# Patient Record
Sex: FEMALE | Race: WHITE | NOT HISPANIC OR LATINO | Employment: OTHER | URBAN - METROPOLITAN AREA
[De-identification: names, ages, dates, MRNs, and addresses within clinical notes are randomized per-mention and may not be internally consistent; named-entity substitution may affect disease eponyms.]

---

## 2022-01-01 ENCOUNTER — DOCUMENTATION ONLY (OUTPATIENT)
Dept: INFUSION THERAPY | Facility: HOSPITAL | Age: 72
End: 2022-01-01
Payer: MEDICARE

## 2022-01-01 ENCOUNTER — TELEPHONE (OUTPATIENT)
Dept: HEMATOLOGY/ONCOLOGY | Facility: CLINIC | Age: 72
End: 2022-01-01
Payer: MEDICARE

## 2022-01-01 ENCOUNTER — INFUSION (OUTPATIENT)
Dept: INFUSION THERAPY | Facility: HOSPITAL | Age: 72
End: 2022-01-01
Attending: NURSE PRACTITIONER
Payer: MEDICARE

## 2022-01-01 ENCOUNTER — OFFICE VISIT (OUTPATIENT)
Dept: PALLIATIVE MEDICINE | Facility: CLINIC | Age: 72
End: 2022-01-01
Payer: MEDICARE

## 2022-01-01 ENCOUNTER — HOSPITAL ENCOUNTER (OUTPATIENT)
Dept: RADIOLOGY | Facility: HOSPITAL | Age: 72
Discharge: HOME | End: 2022-11-10
Attending: INTERNAL MEDICINE
Payer: MEDICARE

## 2022-01-01 ENCOUNTER — INFUSION (OUTPATIENT)
Dept: INFUSION THERAPY | Facility: HOSPITAL | Age: 72
End: 2022-01-01
Attending: INTERNAL MEDICINE
Payer: MEDICARE

## 2022-01-01 ENCOUNTER — TELEPHONE (OUTPATIENT)
Dept: PALLIATIVE MEDICINE | Facility: CLINIC | Age: 72
End: 2022-01-01
Payer: MEDICARE

## 2022-01-01 ENCOUNTER — PATIENT MESSAGE (OUTPATIENT)
Dept: HEMATOLOGY/ONCOLOGY | Facility: CLINIC | Age: 72
End: 2022-01-01
Payer: MEDICARE

## 2022-01-01 ENCOUNTER — OFFICE VISIT (OUTPATIENT)
Dept: HEMATOLOGY/ONCOLOGY | Facility: CLINIC | Age: 72
End: 2022-01-01
Payer: MEDICARE

## 2022-01-01 ENCOUNTER — TELEPHONE (OUTPATIENT)
Dept: INFUSION THERAPY | Facility: HOSPITAL | Age: 72
End: 2022-01-01
Payer: MEDICARE

## 2022-01-01 ENCOUNTER — LAB VISIT (OUTPATIENT)
Dept: LAB | Facility: HOSPITAL | Age: 72
End: 2022-01-01
Attending: INTERNAL MEDICINE
Payer: MEDICARE

## 2022-01-01 ENCOUNTER — HOSPITAL ENCOUNTER (OUTPATIENT)
Dept: RADIOLOGY | Facility: HOSPITAL | Age: 72
Discharge: HOME | End: 2022-10-17
Attending: INTERNAL MEDICINE
Payer: MEDICARE

## 2022-01-01 ENCOUNTER — DOCUMENTATION ONLY (OUTPATIENT)
Dept: INFUSION THERAPY | Facility: HOSPITAL | Age: 72
End: 2022-01-01

## 2022-01-01 ENCOUNTER — PATIENT MESSAGE (OUTPATIENT)
Dept: PALLIATIVE MEDICINE | Facility: CLINIC | Age: 72
End: 2022-01-01
Payer: MEDICARE

## 2022-01-01 ENCOUNTER — TELEPHONE (OUTPATIENT)
Dept: HEMATOLOGY/ONCOLOGY | Facility: CLINIC | Age: 72
End: 2022-01-01

## 2022-01-01 ENCOUNTER — PATIENT MESSAGE (OUTPATIENT)
Dept: HEMATOLOGY/ONCOLOGY | Facility: CLINIC | Age: 72
End: 2022-01-01

## 2022-01-01 ENCOUNTER — TELEPHONE (OUTPATIENT)
Dept: RADIATION ONCOLOGY | Facility: CLINIC | Age: 72
End: 2022-01-01
Payer: MEDICARE

## 2022-01-01 ENCOUNTER — CLINICAL SUPPORT (OUTPATIENT)
Dept: CARDIOLOGY | Facility: HOSPITAL | Age: 72
End: 2022-01-01
Attending: STUDENT IN AN ORGANIZED HEALTH CARE EDUCATION/TRAINING PROGRAM
Payer: MEDICARE

## 2022-01-01 VITALS
HEIGHT: 68 IN | SYSTOLIC BLOOD PRESSURE: 115 MMHG | DIASTOLIC BLOOD PRESSURE: 63 MMHG | BODY MASS INDEX: 19.06 KG/M2 | WEIGHT: 125.75 LBS | RESPIRATION RATE: 18 BRPM | HEART RATE: 89 BPM | TEMPERATURE: 98 F | OXYGEN SATURATION: 94 %

## 2022-01-01 VITALS
SYSTOLIC BLOOD PRESSURE: 102 MMHG | HEIGHT: 68 IN | DIASTOLIC BLOOD PRESSURE: 60 MMHG | HEART RATE: 89 BPM | OXYGEN SATURATION: 97 % | BODY MASS INDEX: 19.71 KG/M2 | WEIGHT: 130.06 LBS | RESPIRATION RATE: 18 BRPM

## 2022-01-01 VITALS
HEART RATE: 85 BPM | BODY MASS INDEX: 19.98 KG/M2 | RESPIRATION RATE: 16 BRPM | DIASTOLIC BLOOD PRESSURE: 70 MMHG | SYSTOLIC BLOOD PRESSURE: 154 MMHG | HEIGHT: 68 IN | OXYGEN SATURATION: 91 % | TEMPERATURE: 98 F | WEIGHT: 131.81 LBS

## 2022-01-01 VITALS
WEIGHT: 130.06 LBS | TEMPERATURE: 99 F | BODY MASS INDEX: 19.71 KG/M2 | HEART RATE: 86 BPM | OXYGEN SATURATION: 97 % | SYSTOLIC BLOOD PRESSURE: 153 MMHG | HEIGHT: 68 IN | RESPIRATION RATE: 16 BRPM | DIASTOLIC BLOOD PRESSURE: 72 MMHG

## 2022-01-01 VITALS
HEIGHT: 68 IN | TEMPERATURE: 98 F | RESPIRATION RATE: 16 BRPM | HEART RATE: 76 BPM | OXYGEN SATURATION: 90 % | WEIGHT: 131.81 LBS | BODY MASS INDEX: 19.98 KG/M2 | SYSTOLIC BLOOD PRESSURE: 117 MMHG | DIASTOLIC BLOOD PRESSURE: 68 MMHG

## 2022-01-01 VITALS
RESPIRATION RATE: 18 BRPM | WEIGHT: 125.75 LBS | BODY MASS INDEX: 19.06 KG/M2 | DIASTOLIC BLOOD PRESSURE: 73 MMHG | HEART RATE: 78 BPM | SYSTOLIC BLOOD PRESSURE: 129 MMHG | OXYGEN SATURATION: 94 % | HEIGHT: 68 IN | TEMPERATURE: 98 F

## 2022-01-01 VITALS
HEART RATE: 83 BPM | WEIGHT: 125.69 LBS | DIASTOLIC BLOOD PRESSURE: 55 MMHG | TEMPERATURE: 98 F | RESPIRATION RATE: 16 BRPM | OXYGEN SATURATION: 95 % | HEIGHT: 68 IN | BODY MASS INDEX: 19.05 KG/M2 | SYSTOLIC BLOOD PRESSURE: 112 MMHG

## 2022-01-01 VITALS
RESPIRATION RATE: 18 BRPM | HEART RATE: 81 BPM | OXYGEN SATURATION: 95 % | SYSTOLIC BLOOD PRESSURE: 131 MMHG | DIASTOLIC BLOOD PRESSURE: 72 MMHG | TEMPERATURE: 98 F | WEIGHT: 125.69 LBS | BODY MASS INDEX: 19.05 KG/M2 | HEIGHT: 68 IN

## 2022-01-01 VITALS — DIASTOLIC BLOOD PRESSURE: 86 MMHG | SYSTOLIC BLOOD PRESSURE: 138 MMHG | HEART RATE: 104 BPM

## 2022-01-01 DIAGNOSIS — C34.12 CANCER OF UPPER LOBE OF LEFT LUNG: Primary | ICD-10-CM

## 2022-01-01 DIAGNOSIS — C34.12 MALIGNANT NEOPLASM OF UPPER LOBE OF LEFT LUNG: Primary | ICD-10-CM

## 2022-01-01 DIAGNOSIS — Z51.5 ENCOUNTER FOR PALLIATIVE CARE: ICD-10-CM

## 2022-01-01 DIAGNOSIS — Z51.5 PALLIATIVE CARE BY SPECIALIST: ICD-10-CM

## 2022-01-01 DIAGNOSIS — M54.12 CERVICAL RADICULOPATHY: ICD-10-CM

## 2022-01-01 DIAGNOSIS — G47.00 INSOMNIA, UNSPECIFIED TYPE: ICD-10-CM

## 2022-01-01 DIAGNOSIS — F43.23 ADJUSTMENT DISORDER WITH MIXED ANXIETY AND DEPRESSED MOOD: ICD-10-CM

## 2022-01-01 DIAGNOSIS — R11.0 NAUSEA: ICD-10-CM

## 2022-01-01 DIAGNOSIS — C34.12 MALIGNANT NEOPLASM OF UPPER LOBE OF LEFT LUNG: ICD-10-CM

## 2022-01-01 DIAGNOSIS — C34.12 CANCER OF UPPER LOBE OF LEFT LUNG: ICD-10-CM

## 2022-01-01 DIAGNOSIS — E03.9 HYPOTHYROIDISM, UNSPECIFIED TYPE: ICD-10-CM

## 2022-01-01 DIAGNOSIS — C78.1 SECONDARY MALIGNANCY OF ANTERIOR MEDIASTINUM: ICD-10-CM

## 2022-01-01 DIAGNOSIS — R63.0 ANOREXIA: ICD-10-CM

## 2022-01-01 DIAGNOSIS — G89.3 CANCER ASSOCIATED PAIN: ICD-10-CM

## 2022-01-01 DIAGNOSIS — E03.9 HYPOTHYROID: ICD-10-CM

## 2022-01-01 DIAGNOSIS — Z71.89 ADVANCED CARE PLANNING/COUNSELING DISCUSSION: ICD-10-CM

## 2022-01-01 DIAGNOSIS — E11.9 TYPE 2 DIABETES MELLITUS WITHOUT COMPLICATION, WITHOUT LONG-TERM CURRENT USE OF INSULIN: ICD-10-CM

## 2022-01-01 DIAGNOSIS — E87.5 HYPERKALEMIA: ICD-10-CM

## 2022-01-01 DIAGNOSIS — R53.0 NEOPLASTIC (MALIGNANT) RELATED FATIGUE: ICD-10-CM

## 2022-01-01 DIAGNOSIS — R06.00 DYSPNEA, UNSPECIFIED TYPE: ICD-10-CM

## 2022-01-01 DIAGNOSIS — R60.9 EDEMA, UNSPECIFIED TYPE: ICD-10-CM

## 2022-01-01 DIAGNOSIS — C34.90 MALIGNANT NEOPLASM OF LUNG, UNSPECIFIED LATERALITY, UNSPECIFIED PART OF LUNG: ICD-10-CM

## 2022-01-01 LAB
ALBUMIN SERPL BCP-MCNC: 3.3 G/DL (ref 3.5–5.2)
ALBUMIN SERPL BCP-MCNC: 3.5 G/DL (ref 3.5–5.2)
ALP SERPL-CCNC: 84 U/L (ref 55–135)
ALP SERPL-CCNC: 90 U/L (ref 55–135)
ALT SERPL W/O P-5'-P-CCNC: <5 U/L (ref 10–44)
ALT SERPL W/O P-5'-P-CCNC: <5 U/L (ref 10–44)
ANION GAP SERPL CALC-SCNC: 7 MMOL/L (ref 8–16)
ANION GAP SERPL CALC-SCNC: 9 MMOL/L (ref 8–16)
AST SERPL-CCNC: 8 U/L (ref 10–40)
AST SERPL-CCNC: 8 U/L (ref 10–40)
BASOPHILS # BLD AUTO: 0.03 K/UL (ref 0–0.2)
BASOPHILS NFR BLD: 0.3 % (ref 0–1.9)
BILIRUB SERPL-MCNC: 0.3 MG/DL (ref 0.1–1)
BILIRUB SERPL-MCNC: 0.3 MG/DL (ref 0.1–1)
BUN SERPL-MCNC: 10 MG/DL (ref 8–23)
BUN SERPL-MCNC: 13 MG/DL (ref 8–23)
CALCIUM SERPL-MCNC: 8.9 MG/DL (ref 8.7–10.5)
CALCIUM SERPL-MCNC: 9 MG/DL (ref 8.7–10.5)
CHLORIDE SERPL-SCNC: 100 MMOL/L (ref 95–110)
CHLORIDE SERPL-SCNC: 103 MMOL/L (ref 95–110)
CO2 SERPL-SCNC: 27 MMOL/L (ref 23–29)
CO2 SERPL-SCNC: 29 MMOL/L (ref 23–29)
CREAT SERPL-MCNC: 0.9 MG/DL (ref 0.5–1.4)
CREAT SERPL-MCNC: 1.2 MG/DL (ref 0.5–1.4)
DIFFERENTIAL METHOD: ABNORMAL
EOSINOPHIL # BLD AUTO: 0.1 K/UL (ref 0–0.5)
EOSINOPHIL NFR BLD: 1.1 % (ref 0–8)
ERYTHROCYTE [DISTWIDTH] IN BLOOD BY AUTOMATED COUNT: 14.5 % (ref 11.5–14.5)
EST. GFR  (NO RACE VARIABLE): 48.1 ML/MIN/1.73 M^2
EST. GFR  (NO RACE VARIABLE): >60 ML/MIN/1.73 M^2
GLUCOSE SERPL-MCNC: 169 MG/DL (ref 70–110)
GLUCOSE SERPL-MCNC: 171 MG/DL (ref 70–110)
HCT VFR BLD AUTO: 40 % (ref 37–48.5)
HGB BLD-MCNC: 11.9 G/DL (ref 12–16)
IMM GRANULOCYTES # BLD AUTO: 0.04 K/UL (ref 0–0.04)
IMM GRANULOCYTES NFR BLD AUTO: 0.4 % (ref 0–0.5)
LYMPHOCYTES # BLD AUTO: 1.1 K/UL (ref 1–4.8)
LYMPHOCYTES NFR BLD: 11.6 % (ref 18–48)
MCH RBC QN AUTO: 28.1 PG (ref 27–31)
MCHC RBC AUTO-ENTMCNC: 29.8 G/DL (ref 32–36)
MCV RBC AUTO: 94 FL (ref 82–98)
MONOCYTES # BLD AUTO: 0.6 K/UL (ref 0.3–1)
MONOCYTES NFR BLD: 6.5 % (ref 4–15)
NEUTROPHILS # BLD AUTO: 7.6 K/UL (ref 1.8–7.7)
NEUTROPHILS NFR BLD: 80.1 % (ref 38–73)
NRBC BLD-RTO: 0 /100 WBC
PLATELET # BLD AUTO: 246 K/UL (ref 150–450)
PMV BLD AUTO: 9.6 FL (ref 9.2–12.9)
POTASSIUM SERPL-SCNC: 4.2 MMOL/L (ref 3.5–5.1)
POTASSIUM SERPL-SCNC: 4.2 MMOL/L (ref 3.5–5.1)
PROT SERPL-MCNC: 7.2 G/DL (ref 6–8.4)
PROT SERPL-MCNC: 7.6 G/DL (ref 6–8.4)
RBC # BLD AUTO: 4.24 M/UL (ref 4–5.4)
SODIUM SERPL-SCNC: 136 MMOL/L (ref 136–145)
SODIUM SERPL-SCNC: 139 MMOL/L (ref 136–145)
T4 FREE SERPL-MCNC: 0.8 NG/DL (ref 0.71–1.51)
TSH SERPL DL<=0.005 MIU/L-ACNC: 3.9 UIU/ML (ref 0.4–4)
WBC # BLD AUTO: 9.42 K/UL (ref 3.9–12.7)

## 2022-01-01 PROCEDURE — 99999 PR PBB SHADOW E&M-EST. PATIENT-LVL II: ICD-10-PCS | Mod: PBBFAC,,, | Performed by: STUDENT IN AN ORGANIZED HEALTH CARE EDUCATION/TRAINING PROGRAM

## 2022-01-01 PROCEDURE — 99999 PR PBB SHADOW E&M-EST. PATIENT-LVL IV: ICD-10-PCS | Mod: PBBFAC,,, | Performed by: INTERNAL MEDICINE

## 2022-01-01 PROCEDURE — 99999 PR PBB SHADOW E&M-EST. PATIENT-LVL V: ICD-10-PCS | Mod: PBBFAC,,, | Performed by: INTERNAL MEDICINE

## 2022-01-01 PROCEDURE — 99215 PR OFFICE/OUTPT VISIT, EST, LEVL V, 40-54 MIN: ICD-10-PCS | Mod: S$PBB,,, | Performed by: STUDENT IN AN ORGANIZED HEALTH CARE EDUCATION/TRAINING PROGRAM

## 2022-01-01 PROCEDURE — 36593 DECLOT VASCULAR DEVICE: CPT | Mod: PN

## 2022-01-01 PROCEDURE — 76536 US SOFT TISSUE HEAD NECK THYROID: ICD-10-PCS | Mod: 26,,, | Performed by: RADIOLOGY

## 2022-01-01 PROCEDURE — 93005 ELECTROCARDIOGRAM TRACING: CPT | Mod: PO

## 2022-01-01 PROCEDURE — 85025 COMPLETE CBC W/AUTO DIFF WBC: CPT | Mod: PN | Performed by: INTERNAL MEDICINE

## 2022-01-01 PROCEDURE — 96413 CHEMO IV INFUSION 1 HR: CPT | Mod: PN

## 2022-01-01 PROCEDURE — 99999 PR PBB SHADOW E&M-EST. PATIENT-LVL IV: CPT | Mod: PBBFAC,,, | Performed by: INTERNAL MEDICINE

## 2022-01-01 PROCEDURE — 93971 EXTREMITY STUDY: CPT | Mod: TC,PO,LT

## 2022-01-01 PROCEDURE — 76536 US EXAM OF HEAD AND NECK: CPT | Mod: 26,,, | Performed by: RADIOLOGY

## 2022-01-01 PROCEDURE — 99215 OFFICE O/P EST HI 40 MIN: CPT | Mod: S$PBB,,, | Performed by: INTERNAL MEDICINE

## 2022-01-01 PROCEDURE — 25000003 PHARM REV CODE 250: Mod: PN | Performed by: INTERNAL MEDICINE

## 2022-01-01 PROCEDURE — 70491 CT SOFT TISSUE NECK W/DYE: CPT | Mod: TC,PO

## 2022-01-01 PROCEDURE — 99442 PR PHYSICIAN TELEPHONE EVALUATION 11-20 MIN: CPT | Mod: 95,,, | Performed by: STUDENT IN AN ORGANIZED HEALTH CARE EDUCATION/TRAINING PROGRAM

## 2022-01-01 PROCEDURE — 93971 US UPPER EXTREMITY VEINS LEFT: ICD-10-PCS | Mod: 26,LT,, | Performed by: RADIOLOGY

## 2022-01-01 PROCEDURE — 71260 CT CHEST ABDOMEN PELVIS WITH CONTRAST (XPD): ICD-10-PCS | Mod: 26,,, | Performed by: RADIOLOGY

## 2022-01-01 PROCEDURE — 84439 ASSAY OF FREE THYROXINE: CPT | Performed by: INTERNAL MEDICINE

## 2022-01-01 PROCEDURE — 99215 OFFICE O/P EST HI 40 MIN: CPT | Mod: PBBFAC,PN,25 | Performed by: INTERNAL MEDICINE

## 2022-01-01 PROCEDURE — 74177 CT ABD & PELVIS W/CONTRAST: CPT | Mod: TC,PO

## 2022-01-01 PROCEDURE — 71260 CT THORAX DX C+: CPT | Mod: TC,PO

## 2022-01-01 PROCEDURE — 63600175 PHARM REV CODE 636 W HCPCS: Mod: JG,PN | Performed by: INTERNAL MEDICINE

## 2022-01-01 PROCEDURE — 99999 PR PBB SHADOW E&M-EST. PATIENT-LVL II: CPT | Mod: PBBFAC,,, | Performed by: STUDENT IN AN ORGANIZED HEALTH CARE EDUCATION/TRAINING PROGRAM

## 2022-01-01 PROCEDURE — 99215 OFFICE O/P EST HI 40 MIN: CPT | Mod: PBBFAC,25,PN | Performed by: INTERNAL MEDICINE

## 2022-01-01 PROCEDURE — 74177 CT CHEST ABDOMEN PELVIS WITH CONTRAST (XPD): ICD-10-PCS | Mod: 26,,, | Performed by: RADIOLOGY

## 2022-01-01 PROCEDURE — 99999 PR PBB SHADOW E&M-EST. PATIENT-LVL V: CPT | Mod: PBBFAC,,, | Performed by: INTERNAL MEDICINE

## 2022-01-01 PROCEDURE — 25500020 PHARM REV CODE 255: Mod: PO | Performed by: INTERNAL MEDICINE

## 2022-01-01 PROCEDURE — 80053 COMPREHEN METABOLIC PANEL: CPT | Mod: 91,PN | Performed by: INTERNAL MEDICINE

## 2022-01-01 PROCEDURE — 99215 OFFICE O/P EST HI 40 MIN: CPT | Mod: PBBFAC,PN | Performed by: INTERNAL MEDICINE

## 2022-01-01 PROCEDURE — 70491 CT SOFT TISSUE NECK WITH CONTRAST: ICD-10-PCS | Mod: 26,,, | Performed by: RADIOLOGY

## 2022-01-01 PROCEDURE — 80053 COMPREHEN METABOLIC PANEL: CPT | Mod: PN | Performed by: INTERNAL MEDICINE

## 2022-01-01 PROCEDURE — 99212 OFFICE O/P EST SF 10 MIN: CPT | Mod: PBBFAC | Performed by: STUDENT IN AN ORGANIZED HEALTH CARE EDUCATION/TRAINING PROGRAM

## 2022-01-01 PROCEDURE — A9698 NON-RAD CONTRAST MATERIALNOC: HCPCS | Mod: PO | Performed by: INTERNAL MEDICINE

## 2022-01-01 PROCEDURE — 99215 PR OFFICE/OUTPT VISIT, EST, LEVL V, 40-54 MIN: ICD-10-PCS | Mod: S$PBB,,, | Performed by: INTERNAL MEDICINE

## 2022-01-01 PROCEDURE — 74177 CT ABD & PELVIS W/CONTRAST: CPT | Mod: 26,,, | Performed by: RADIOLOGY

## 2022-01-01 PROCEDURE — 99215 OFFICE O/P EST HI 40 MIN: CPT | Mod: S$PBB,,, | Performed by: STUDENT IN AN ORGANIZED HEALTH CARE EDUCATION/TRAINING PROGRAM

## 2022-01-01 PROCEDURE — 99214 OFFICE O/P EST MOD 30 MIN: CPT | Mod: PBBFAC,PN,25 | Performed by: INTERNAL MEDICINE

## 2022-01-01 PROCEDURE — 93010 EKG 12-LEAD: ICD-10-PCS | Mod: ,,, | Performed by: INTERNAL MEDICINE

## 2022-01-01 PROCEDURE — A4216 STERILE WATER/SALINE, 10 ML: HCPCS | Mod: PN | Performed by: INTERNAL MEDICINE

## 2022-01-01 PROCEDURE — 93971 EXTREMITY STUDY: CPT | Mod: 26,LT,, | Performed by: RADIOLOGY

## 2022-01-01 PROCEDURE — 70491 CT SOFT TISSUE NECK W/DYE: CPT | Mod: 26,,, | Performed by: RADIOLOGY

## 2022-01-01 PROCEDURE — 36415 COLL VENOUS BLD VENIPUNCTURE: CPT | Mod: PN | Performed by: INTERNAL MEDICINE

## 2022-01-01 PROCEDURE — 99442 PR PHYSICIAN TELEPHONE EVALUATION 11-20 MIN: ICD-10-PCS | Mod: 95,,, | Performed by: STUDENT IN AN ORGANIZED HEALTH CARE EDUCATION/TRAINING PROGRAM

## 2022-01-01 PROCEDURE — 93010 ELECTROCARDIOGRAM REPORT: CPT | Mod: ,,, | Performed by: INTERNAL MEDICINE

## 2022-01-01 PROCEDURE — 84443 ASSAY THYROID STIM HORMONE: CPT | Performed by: INTERNAL MEDICINE

## 2022-01-01 PROCEDURE — 71260 CT THORAX DX C+: CPT | Mod: 26,,, | Performed by: RADIOLOGY

## 2022-01-01 PROCEDURE — 76536 US EXAM OF HEAD AND NECK: CPT | Mod: TC,PO

## 2022-01-01 RX ORDER — EPINEPHRINE 0.3 MG/.3ML
0.3 INJECTION SUBCUTANEOUS ONCE AS NEEDED
Status: CANCELLED | OUTPATIENT
Start: 2022-01-01

## 2022-01-01 RX ORDER — WATER FOR INJECTION,STERILE
VIAL (ML) INJECTION
Status: DISCONTINUED
Start: 2022-01-01 | End: 2022-01-01 | Stop reason: HOSPADM

## 2022-01-01 RX ORDER — ONDANSETRON HYDROCHLORIDE 8 MG/1
8 TABLET, FILM COATED ORAL EVERY 8 HOURS PRN
Qty: 60 TABLET | Refills: 2 | Status: SHIPPED | OUTPATIENT
Start: 2022-01-01

## 2022-01-01 RX ORDER — SODIUM CHLORIDE 0.9 % (FLUSH) 0.9 %
10 SYRINGE (ML) INJECTION
Status: CANCELLED | OUTPATIENT
Start: 2022-01-01

## 2022-01-01 RX ORDER — ONDANSETRON HYDROCHLORIDE 8 MG/1
8 TABLET, FILM COATED ORAL DAILY PRN
Qty: 40 TABLET | Refills: 1 | Status: SHIPPED | OUTPATIENT
Start: 2022-01-01 | End: 2022-01-01

## 2022-01-01 RX ORDER — METHADONE HYDROCHLORIDE 10 MG/1
10 TABLET ORAL 3 TIMES DAILY
Qty: 90 TABLET | Refills: 0 | Status: SHIPPED | OUTPATIENT
Start: 2022-01-01 | End: 2022-01-01 | Stop reason: SDUPTHER

## 2022-01-01 RX ORDER — HEPARIN 100 UNIT/ML
500 SYRINGE INTRAVENOUS
Status: CANCELLED | OUTPATIENT
Start: 2022-01-01

## 2022-01-01 RX ORDER — GABAPENTIN 800 MG/1
800 TABLET ORAL 3 TIMES DAILY
Qty: 90 TABLET | Refills: 0 | Status: SHIPPED | OUTPATIENT
Start: 2022-01-01 | End: 2023-01-01 | Stop reason: SDUPTHER

## 2022-01-01 RX ORDER — EPINEPHRINE 0.3 MG/.3ML
0.3 INJECTION SUBCUTANEOUS ONCE AS NEEDED
Status: DISCONTINUED | OUTPATIENT
Start: 2022-01-01 | End: 2022-01-01 | Stop reason: HOSPADM

## 2022-01-01 RX ORDER — DIPHENHYDRAMINE HYDROCHLORIDE 50 MG/ML
50 INJECTION INTRAMUSCULAR; INTRAVENOUS ONCE AS NEEDED
Status: DISCONTINUED | OUTPATIENT
Start: 2022-01-01 | End: 2022-01-01 | Stop reason: HOSPADM

## 2022-01-01 RX ORDER — HEPARIN 100 UNIT/ML
500 SYRINGE INTRAVENOUS
Status: DISCONTINUED | OUTPATIENT
Start: 2022-01-01 | End: 2022-01-01 | Stop reason: HOSPADM

## 2022-01-01 RX ORDER — SODIUM CHLORIDE 0.9 % (FLUSH) 0.9 %
10 SYRINGE (ML) INJECTION
Status: DISCONTINUED | OUTPATIENT
Start: 2022-01-01 | End: 2022-01-01 | Stop reason: HOSPADM

## 2022-01-01 RX ORDER — FLUOXETINE HYDROCHLORIDE 40 MG/1
40 CAPSULE ORAL DAILY
Qty: 30 CAPSULE | Refills: 11 | Status: SHIPPED | OUTPATIENT
Start: 2022-01-01 | End: 2023-10-10

## 2022-01-01 RX ORDER — DIPHENHYDRAMINE HYDROCHLORIDE 50 MG/ML
50 INJECTION INTRAMUSCULAR; INTRAVENOUS ONCE AS NEEDED
Status: CANCELLED | OUTPATIENT
Start: 2022-01-01

## 2022-01-01 RX ORDER — OXYCODONE HYDROCHLORIDE 30 MG/1
30 TABLET ORAL EVERY 4 HOURS PRN
Qty: 180 TABLET | Refills: 0 | Status: SHIPPED | OUTPATIENT
Start: 2022-01-01 | End: 2023-01-01 | Stop reason: SDUPTHER

## 2022-01-01 RX ORDER — TRAZODONE HYDROCHLORIDE 50 MG/1
50 TABLET ORAL NIGHTLY
Qty: 30 TABLET | Refills: 0 | Status: SHIPPED | OUTPATIENT
Start: 2022-01-01 | End: 2023-01-01 | Stop reason: SDUPTHER

## 2022-01-01 RX ORDER — FLUOXETINE HYDROCHLORIDE 20 MG/1
20 CAPSULE ORAL NIGHTLY
Qty: 30 CAPSULE | Refills: 2 | Status: SHIPPED | OUTPATIENT
Start: 2022-01-01 | End: 2022-01-01

## 2022-01-01 RX ORDER — METFORMIN HYDROCHLORIDE 1000 MG/1
1 TABLET ORAL 2 TIMES DAILY WITH MEALS
COMMUNITY
Start: 2022-06-20 | End: 2023-01-01

## 2022-01-01 RX ORDER — METHADONE HYDROCHLORIDE 10 MG/1
10 TABLET ORAL 2 TIMES DAILY
Qty: 60 TABLET | Refills: 0 | Status: SHIPPED | OUTPATIENT
Start: 2022-01-01 | End: 2022-01-01 | Stop reason: SDUPTHER

## 2022-01-01 RX ADMIN — SODIUM CHLORIDE: 0.9 INJECTION, SOLUTION INTRAVENOUS at 10:10

## 2022-01-01 RX ADMIN — Medication 500 UNITS: at 12:11

## 2022-01-01 RX ADMIN — SODIUM CHLORIDE: 0.9 INJECTION, SOLUTION INTRAVENOUS at 11:11

## 2022-01-01 RX ADMIN — ALTEPLASE 2 MG: 2.2 INJECTION, POWDER, LYOPHILIZED, FOR SOLUTION INTRAVENOUS at 10:11

## 2022-01-01 RX ADMIN — DURVALUMAB 1500 MG: 500 INJECTION, SOLUTION INTRAVENOUS at 11:10

## 2022-01-01 RX ADMIN — DURVALUMAB 1500 MG: 500 INJECTION, SOLUTION INTRAVENOUS at 11:11

## 2022-01-01 RX ADMIN — SODIUM CHLORIDE 1500 MG: 9 INJECTION, SOLUTION INTRAVENOUS at 02:12

## 2022-01-01 RX ADMIN — Medication 500 UNITS: at 01:10

## 2022-01-01 RX ADMIN — Medication 500 UNITS: at 03:12

## 2022-01-01 RX ADMIN — Medication 500 UNITS: at 11:09

## 2022-01-01 RX ADMIN — IOHEXOL 75 ML: 350 INJECTION, SOLUTION INTRAVENOUS at 10:11

## 2022-01-01 RX ADMIN — Medication 10 ML: at 11:09

## 2022-01-01 RX ADMIN — Medication 10 ML: at 01:10

## 2022-01-01 RX ADMIN — BARIUM SULFATE 900 ML: 20 SUSPENSION ORAL at 11:11

## 2022-01-01 RX ADMIN — SODIUM CHLORIDE: 9 INJECTION, SOLUTION INTRAVENOUS at 01:12

## 2022-01-01 RX ADMIN — SODIUM CHLORIDE: 0.9 INJECTION, SOLUTION INTRAVENOUS at 10:09

## 2022-01-01 RX ADMIN — DURVALUMAB 1500 MG: 500 INJECTION, SOLUTION INTRAVENOUS at 10:09

## 2022-01-25 ENCOUNTER — OFFICE VISIT (OUTPATIENT)
Dept: VASCULAR SURGERY | Facility: CLINIC | Age: 72
End: 2022-01-25
Payer: MEDICARE

## 2022-01-25 VITALS
SYSTOLIC BLOOD PRESSURE: 148 MMHG | BODY MASS INDEX: 22.59 KG/M2 | HEART RATE: 88 BPM | DIASTOLIC BLOOD PRESSURE: 79 MMHG | WEIGHT: 148.56 LBS

## 2022-01-25 DIAGNOSIS — E11.9 TYPE 2 DIABETES MELLITUS WITHOUT COMPLICATION, WITHOUT LONG-TERM CURRENT USE OF INSULIN: Primary | ICD-10-CM

## 2022-01-25 DIAGNOSIS — F17.210 CIGARETTE SMOKER: ICD-10-CM

## 2022-01-25 DIAGNOSIS — R91.8 MASS OF UPPER LOBE OF LEFT LUNG: ICD-10-CM

## 2022-01-25 PROCEDURE — 99999 PR PBB SHADOW E&M-EST. PATIENT-LVL III: CPT | Mod: PBBFAC,,, | Performed by: THORACIC SURGERY (CARDIOTHORACIC VASCULAR SURGERY)

## 2022-01-25 PROCEDURE — 99999 PR PBB SHADOW E&M-EST. PATIENT-LVL III: ICD-10-PCS | Mod: PBBFAC,,, | Performed by: THORACIC SURGERY (CARDIOTHORACIC VASCULAR SURGERY)

## 2022-01-25 PROCEDURE — 99213 OFFICE O/P EST LOW 20 MIN: CPT | Mod: PBBFAC,PO | Performed by: THORACIC SURGERY (CARDIOTHORACIC VASCULAR SURGERY)

## 2022-01-25 PROCEDURE — 99205 PR OFFICE/OUTPT VISIT, NEW, LEVL V, 60-74 MIN: ICD-10-PCS | Mod: S$PBB,,, | Performed by: THORACIC SURGERY (CARDIOTHORACIC VASCULAR SURGERY)

## 2022-01-25 PROCEDURE — 99205 OFFICE O/P NEW HI 60 MIN: CPT | Mod: S$PBB,,, | Performed by: THORACIC SURGERY (CARDIOTHORACIC VASCULAR SURGERY)

## 2022-01-25 RX ORDER — FLUTICASONE FUROATE AND VILANTEROL TRIFENATATE 100; 25 UG/1; UG/1
1 POWDER RESPIRATORY (INHALATION)
COMMUNITY
Start: 2021-12-10 | End: 2022-01-01

## 2022-01-25 RX ORDER — GABAPENTIN 800 MG/1
800 TABLET ORAL 3 TIMES DAILY
COMMUNITY
Start: 2022-01-10 | End: 2022-01-01

## 2022-01-25 RX ORDER — LISINOPRIL 2.5 MG/1
2.5 TABLET ORAL DAILY
Status: ON HOLD | COMMUNITY
Start: 2021-12-27 | End: 2022-03-09

## 2022-01-25 RX ORDER — FLUOXETINE HYDROCHLORIDE 40 MG/1
40 CAPSULE ORAL DAILY
COMMUNITY
Start: 2022-01-18 | End: 2022-02-17 | Stop reason: ALTCHOICE

## 2022-01-25 RX ORDER — LIDOCAINE 50 MG/G
PATCH TOPICAL
COMMUNITY
Start: 2022-01-18 | End: 2022-07-27 | Stop reason: SDUPTHER

## 2022-01-26 PROBLEM — R91.8 MASS OF UPPER LOBE OF LEFT LUNG: Status: ACTIVE | Noted: 2022-01-26

## 2022-01-26 PROBLEM — F17.210 CIGARETTE SMOKER: Status: ACTIVE | Noted: 2022-01-26

## 2022-01-26 NOTE — PROGRESS NOTES
CHIEF COMPLAINT:   Chief Complaint   Patient presents with    Lung Cancer     Dayron/lung cancer       REFERRING PROVIDER: William Padgett Jr., DO    Reason for consult:  Evaluation of left lung mass    History of Present Illness     Patient is a 71 y.o. female with long history of chronic cigarette use who underwent screening CT scan showing a left upper lobe lung mass.  She has undergone bronchoscopic biopsy with no specific findings but suspicion for malignancy.  PET scan as hypermetabolic activity also suspicious for malignancy.  She had significant shortness of breath after her bronchoscopy.    Patient Active Problem List    Diagnosis Date Noted    Viral illness 07/29/2016    Leukocytosis 07/28/2016    Opiate overdose 07/26/2016    Acute kidney injury 07/26/2016    Encephalopathy 07/26/2016    Nausea and vomiting 07/26/2016    Cervical radiculopathy 07/26/2016    Type 2 diabetes mellitus without complication 07/26/2016     Past Medical History:   Diagnosis Date    Acute kidney injury     Cervical radiculopathy     Diabetes mellitus     Hypertension     Neuropathy     Rhabdomyolysis       Past Surgical History:   Procedure Laterality Date    BILATERAL TUBAL LIGATION      ENDOBRONCHIAL ULTRASOUND N/A 12/13/2021    Procedure: ENDOBRONCHIAL ULTRASOUN;  Surgeon: William Padgett Jr., DO;  Location: [facility];  Service: Pulmonary;  Laterality: N/A;    HYSTERECTOMY      NAVIGATIONAL BRONCHOSCOPY Left 12/13/2021    Procedure: BRONCHOSCOPY, NAVIGATIONAL;  Surgeon: William Padgett Jr., DO;  Location: [facility];  Service: Pulmonary;  Laterality: Left;      Medication List with Changes/Refills   Current Medications    ALBUTEROL (PROVENTIL/VENTOLIN HFA) 90 MCG/ACTUATION INHALER    28 puffs 2 (two) times daily as needed for Wheezing. Rescue    BREO ELLIPTA 100-25 MCG/DOSE DISKUS INHALER    Inhale 2 puffs into the lungs once daily.    BUPRENORPHINE HCL (SUBUTEX) 8 MG SUBL    Place 8 mg under the tongue once  daily.    FLUOXETINE 40 MG CAPSULE    Take 40 mg by mouth once daily.    FLUTICASONE FUROATE-VILANTEROL (BREO ELLIPTA) 100-25 MCG/DOSE DISKUS INHALER    Inhale 1 puff into the lungs.    GABAPENTIN (NEURONTIN) 800 MG TABLET    Take 800 mg by mouth 3 (three) times daily.    JARDIANCE 25 MG TABLET    Take 1 Tablet (25 mg) by mouth daily in the morning.    LIDOCAINE (LIDODERM) 5 %    apply ONE PATCH ONCE DAILY FOR 12 hours THEN REMOVE    LISINOPRIL (PRINIVIL,ZESTRIL) 2.5 MG TABLET    Take 2.5 mg by mouth once daily.    LOVASTATIN (MEVACOR) 10 MG TABLET    Take 10 mg by mouth nightly.    METFORMIN (GLUCOPHAGE) 1000 MG TABLET    Take 1,000 mg by mouth 2 (two) times daily with meals.   Discontinued Medications    FLUOXETINE (PROZAC) 20 MG CAPSULE    Take 40 mg by mouth once daily.    GABAPENTIN (NEURONTIN) 300 MG CAPSULE    Take 600 mg by mouth every 12 (twelve) hours.    LISINOPRIL (PRINIVIL,ZESTRIL) 20 MG TABLET    Take 20 mg by mouth once daily.     Review of patient's allergies indicates:   Allergen Reactions    Codeine Hives and Other (See Comments)     Other reaction(s): Unknown  Other reaction(s): Unknown      Ibuprofen Swelling    Morphine Diarrhea and Other (See Comments)     Other reaction(s): Renal Dysfunctions  Other reaction(s): Renal Dysfunctions      Opioids - morphine analogues      Kidney injury      Social History     Tobacco Use    Smoking status: Current Every Day Smoker     Packs/day: 0.50     Types: Cigarettes    Smokeless tobacco: Never Used    Tobacco comment: Age Started: 15   Substance Use Topics    Alcohol use: No      Family History   Problem Relation Age of Onset    Valvular heart disease Mother       Review of Systems  General:  Patient lost 40 lb purposefully last year.  She has recently been relatively fatigued.  HEENT occasional headaches and dizziness lately.  No visual field changes or hoarseness.  Neck:  No complaints.  Respiratory:  Positive shortness of breath with activities  which is worsened recently.  She had significant episodes of shortness of breath after her bronchoscopy.  She has a chronic morning cough.  Cardiac:  No angina or palpitations.  GI:  No complaints.  :  No complaints.  Musculoskeletal:  Positive neuropathy in her lower extremities.  Neurologic:  No lateralizing complaints.    Objective: Physical Exam     Vitals:    01/25/22 1410 01/25/22 1419   BP: (!) 165/76 (!) 148/79   Pulse: 93 88   Weight: 67.4 kg (148 lb 9.4 oz)    PainSc:   6    PainLoc: Neck        Objective    General:  Pleasant lady in no obvious distress.  HEENT:  Normocephalic, atraumatic.  There is good facial symmetry.  Neck:  No jugular venous distention.  No palpable masses.  Chest:  No obvious abnormalities.  Lungs:  Slight inspiratory squeak on the right and left.  Heart:  Regular rate and rhythm.  No murmurs or rubs.  Abdomen:  No organomegaly.  Lymphatic:  No palpable supraclavicular lymphadenopathy.  Musculoskeletal:  No significant edema.  Neurologic:  Alert oriented x4 no focal deficits.    Data Review:   Lab Results   Component Value Date    WBC 17.48 (H) 07/29/2016    HGB 13.2 12/13/2021    HCT 37.7 07/29/2016    MCV 93 07/29/2016     07/29/2016   ,   BMP   Lab Results   Component Value Date     12/13/2021    K 4.6 12/13/2021     12/13/2021    CO2 29 12/13/2021    BUN 17 12/13/2021    CREATININE 1.04 12/13/2021    CALCIUM 9.5 12/13/2021    ANIONGAP 6 (L) 12/13/2021    ESTGFRAFRICA >60 12/13/2021    EGFRNONAA 54 (A) 12/13/2021   ,   CMP  Sodium   Date Value Ref Range Status   12/13/2021 139 136 - 145 mmol/L Final     Potassium   Date Value Ref Range Status   12/13/2021 4.6 3.5 - 5.1 mmol/L Final     Chloride   Date Value Ref Range Status   12/13/2021 104 95 - 110 mmol/L Final     CO2   Date Value Ref Range Status   12/13/2021 29 22 - 31 mmol/L Final     Glucose   Date Value Ref Range Status   12/13/2021 142 (H) 70 - 110 mg/dL Final     Comment:     The ADA recommends the  following guidelines for fasting glucose:    Normal:       less than 100 mg/dL    Prediabetes:  100 mg/dL to 125 mg/dL    Diabetes:     126 mg/dL or higher       BUN   Date Value Ref Range Status   12/13/2021 17 7 - 18 mg/dL Final     Creatinine   Date Value Ref Range Status   12/13/2021 1.04 0.50 - 1.40 mg/dL Final     Calcium   Date Value Ref Range Status   12/13/2021 9.5 8.4 - 10.2 mg/dL Final     Total Protein   Date Value Ref Range Status   07/27/2016 6.7 6.0 - 8.4 g/dL Final     Albumin   Date Value Ref Range Status   07/27/2016 3.6 3.5 - 5.2 g/dL Final     Total Bilirubin   Date Value Ref Range Status   07/27/2016 0.4 0.1 - 1.0 mg/dL Final     Comment:     For infants and newborns, interpretation of results should be based  on gestational age, weight and in agreement with clinical  observations.  Premature Infant recommended reference ranges:  Up to 24 hours.............<8.0 mg/dL  Up to 48 hours............<12.0 mg/dL  3-5 days..................<15.0 mg/dL  6-29 days.................<15.0 mg/dL       Alkaline Phosphatase   Date Value Ref Range Status   07/27/2016 74 38 - 145 U/L Final     AST   Date Value Ref Range Status   07/27/2016 23 14 - 36 U/L Final     ALT   Date Value Ref Range Status   07/27/2016 24 10 - 44 U/L Final     Anion Gap   Date Value Ref Range Status   12/13/2021 6 (L) 8 - 16 mmol/L Final     eGFR if    Date Value Ref Range Status   12/13/2021 >60 >60 mL/min/1.73 m^2 Final     eGFR if non    Date Value Ref Range Status   12/13/2021 54 (A) >60 mL/min/1.73 m^2 Final     Comment:     Calculation used to obtain the estimated glomerular filtration  rate (eGFR) is the CKD-EPI equation.          Chest X-Ray:     X-Ray Chest 1 View    Narrative  Chest, 1 view 07/29/2016 at 7:46 AM    Clinical data: Clinical history is aspiration.    Comparison: 07/20/2016    Findings:  There is improved central, basilar aeration overall as compared to the previous study. No  pneumothorax, lobar consolidation or cardiac decompensation is appreciated. No other significant interval changes are appreciated.  IMPRESSION:  There is interval improved central, basilar aeration overall.    ______________________________________    Electronically signed by: RAFAEL CONNOLLY MD  Date:     07/29/16  Time:    08:28      CT lung screening without contrast:  [facility] is of [facility]:  09/20/2021  There is a spiculated mass in the anterior segment of the left upper lobe which abuts the anterior and mediastinal pleura.  This has a craniocaudad oblique diameter of 21 mm, an AP oblique diameter 42 mm and a transverse diameter of 23 mm.  Advanced COPD is noted.  There are multiple scattered foci of nodular septal thickening.  Left lower lobe posterior basal septal thickening and atelectasis are nonspecific but have a distribution with suggests atelectasis or scarring.  There are multiple borderline enlarged mediastinal lymph nodes which are suspicious given the left upper lobe lung mass.  Anthracotic calcifications are present.  There is no pleural or pericardial fluid.      CT PET  [facility]  11/11/2021  Spiculated pleural base mass in the medial left upper lobe extending into the anterior mediastinum measures 3.2 x 3.5 cm on image 72 with maximum SUV of 8.5.  No enlarged or FDG avid lymph nodes are in the chest.    1.6 x 1.1 cm intraparotid nodule on the left has a maximum SUV of 5.9.  No enlarged or FDG avid lymph nodes are elsewhere in the neck.        Pulmonary functions studies:  11/05/2021  FVC 1.96 (61%)  FEV1 1.47 (60%)  TLC 73%  DLCO 9.18 (47%)    Assessment:     1. Left upper lobe lung mass suspicious for primary malignancy with possible direct invasion of mediastinum by radiographic studies.  2. Type 2 diabetes without long-term use of insulin.  3. Essential hypertension.  4. Chronic kidney disease.  5. Chronic ongoing cigarette use.  6. Patient had  significant complaints of shortness of breath and initial oxygen requirement after bronchoscopy.      Plan:     I have reports from her CT scan and PET scan both suggesting possible invasion of the mediastinum from a left upper lobe lung mass.  I will have to review the imaging studies prior to determination as to whether surgical intervention is possible.  I would be surprised if I could not at least perform a diagnostic procedure.  If there is truly invasion of the mediastinum, however, complete surgical resection would likely not be possible.    Another concern is that the patient reports significant shortness of breath and hypoxemia just following her bronchoscopy.  This would certainly raise concerns for possible major lung resection of the upper lobe.    Addendum:  01/30/2022  I was able to review the CT PET scan with the radiologist.  Findings:  The left upper lobe hypermetabolic lung mass invades the mediastinum.  This would not be realistically amenable to complete surgical resection.  Is felt that Interventional Radiology can perform CT-guided biopsy.  I would therefore recommend CT-guided biopsy of the lesion for diagnosis.  If for some reason diagnosis cannot be obtained, I would be able to perform a thoracoscopy with wedge resection, but I do not believe I could provide complete a cancer operation given the invasion to the mediastinum.  At the same setting at the CT-guided biopsy of the lung, CT-guided biopsy of the parotid mass could also be performed.

## 2022-01-27 ENCOUNTER — DOCUMENTATION ONLY (OUTPATIENT)
Dept: HEMATOLOGY/ONCOLOGY | Facility: CLINIC | Age: 72
End: 2022-01-27
Payer: MEDICARE

## 2022-01-27 NOTE — PROGRESS NOTES
[facility]      THORACIC MULTIDISCIPLINARY TUMOR BOARD  PATIENT REVIEW FORM  ________________________________________________________________________    CLINIC #: [id number]  DATE: 01/27/2022    TUMOR SITE:   Lung; left    PRESENTER:   Radha    PATIENT SUMMARY:   Does tumor invade mediastinum?     BOARD RECOMMENDATIONS:   Upload scans to review and determine if mediastinal extension and need for bx    02/17/2022 Addendum:   Mediastinal invasion by the primary tumor. There are no enlarged or FDG avid LN on the PET, and EBUS mediastinal danie staging was negative as well.        Chhaya Woodard

## 2022-02-10 ENCOUNTER — DOCUMENTATION ONLY (OUTPATIENT)
Dept: HEMATOLOGY/ONCOLOGY | Facility: CLINIC | Age: 72
End: 2022-02-10
Payer: MEDICARE

## 2022-02-10 ENCOUNTER — TELEPHONE (OUTPATIENT)
Dept: HEMATOLOGY/ONCOLOGY | Facility: CLINIC | Age: 72
End: 2022-02-10
Payer: MEDICARE

## 2022-02-10 DIAGNOSIS — R91.8 MASS OF UPPER LOBE OF LEFT LUNG: Primary | ICD-10-CM

## 2022-02-10 NOTE — PROGRESS NOTES
[facility]      THORACIC MULTIDISCIPLINARY TUMOR BOARD  PATIENT REVIEW FORM  ________________________________________________________________________    CLINIC #: [id number]  DATE: 02/10/2022    TUMOR SITE:   GRACIELA mass, parotid nodule     PRESENTER:   Dr. Echeverria     PATIENT SUMMARY:   72 y/o, smoker, DM, HTN. SOB after bronchoscopy    09/20/2021: Screening CT chest showed spiculated mass in anterior segment GRACIELA, multiple borderline/enlarged mediastinal lymph nodes  11/24/2021: PET/CT GRACIELA 3.2 x 3.3 cm mass, close proximity to aortic arch, no other FDG avid foci in chest. Parotid nodule. Mediastinal node involvement.    02/17/2022 Addendum  Mediastinal invasion by the primary tumor. There are no enlarged or FDG avid LN on the PET, and EBUS mediastinal danie staging was negative as well      BOARD RECOMMENDATIONS:   IR Bx GRACIELA mass and Lt parotid nodule as planned 02/14/2022  MRI brain w/ and w/o contrast   Med Onc, Rad Onc referral    CONSULT NEEDED:     [] Surgery    [x] Hem/Onc    [x] Rad/Onc    [] Dietary                 [] Social Service    [] Psychology       [] Pulmonology      PRESENTATION AT CANCER CONFERENCE:         [x] Prospective    [] Retrospective     [] Follow-Up          [] Eligible for clinical trial        Chhaya Woodard

## 2022-02-11 ENCOUNTER — TELEPHONE (OUTPATIENT)
Dept: HEMATOLOGY/ONCOLOGY | Facility: CLINIC | Age: 72
End: 2022-02-11
Payer: MEDICARE

## 2022-02-11 NOTE — NURSING
Attempted to reach patient to discuss Hem/Onc. Rad/Onc referral and MRI recommended by Dr. Padgett. No answer and unable to leave a msg. Will try again at a later time    Oncology Navigation   Intake  Date of Diagnosis: 2/10/2022  Cancer Type: Thoracic  Internal / External Referral: Internal  Referral Source: Epic referral from Dr. Padgett  Date of Referral: 2/10/2022  Initial Nurse Navigator Contact: 2/11/2022  Referral to Initial Contact Timeline (days): 1  Date Worked: 2/11/2022     Treatment  Date Presented to Tumor Board: 2/10/2022  Presentation Notes: IR Bx GRACIELA mass and Lt parotid nodule as planned 02/14/2022  MRI brain w/ and w/o contrast   Med Onc, Rad Onc referral       Medical Oncologist: Dr. Mere Styles    Radiation Oncologist: Dr. Ziggy Sanders    Procedures: MRI          Radiation Oncologist: Dr. Ziggy Sanders        Acuity      Follow Up  No follow-ups on file.

## 2022-02-11 NOTE — NURSING
Received returned call. Reviewed appointments with patient. She will contact [facility] to schedule the MRI. Patient was provided with their contact number. Instructed to contact the navigation office with any questions and she verbalized understanding

## 2022-02-14 ENCOUNTER — DOCUMENTATION ONLY (OUTPATIENT)
Dept: HEMATOLOGY/ONCOLOGY | Facility: CLINIC | Age: 72
End: 2022-02-14
Payer: MEDICARE

## 2022-02-17 ENCOUNTER — OFFICE VISIT (OUTPATIENT)
Dept: RADIATION ONCOLOGY | Facility: CLINIC | Age: 72
End: 2022-02-17
Payer: MEDICARE

## 2022-02-17 ENCOUNTER — OFFICE VISIT (OUTPATIENT)
Dept: HEMATOLOGY/ONCOLOGY | Facility: CLINIC | Age: 72
End: 2022-02-17
Payer: MEDICARE

## 2022-02-17 ENCOUNTER — TELEPHONE (OUTPATIENT)
Dept: HEMATOLOGY/ONCOLOGY | Facility: CLINIC | Age: 72
End: 2022-02-17
Payer: MEDICARE

## 2022-02-17 VITALS
DIASTOLIC BLOOD PRESSURE: 70 MMHG | BODY MASS INDEX: 22.92 KG/M2 | HEART RATE: 92 BPM | WEIGHT: 151.25 LBS | RESPIRATION RATE: 18 BRPM | HEIGHT: 68 IN | OXYGEN SATURATION: 94 % | TEMPERATURE: 99 F | SYSTOLIC BLOOD PRESSURE: 122 MMHG

## 2022-02-17 VITALS — WEIGHT: 150.81 LBS | BODY MASS INDEX: 22.86 KG/M2 | HEIGHT: 68 IN

## 2022-02-17 DIAGNOSIS — M54.12 CERVICAL RADICULOPATHY: ICD-10-CM

## 2022-02-17 DIAGNOSIS — C34.12 MALIGNANT NEOPLASM OF UPPER LOBE OF LEFT LUNG: Primary | ICD-10-CM

## 2022-02-17 DIAGNOSIS — F32.1 CURRENT MODERATE EPISODE OF MAJOR DEPRESSIVE DISORDER, UNSPECIFIED WHETHER RECURRENT: ICD-10-CM

## 2022-02-17 DIAGNOSIS — E11.9 TYPE 2 DIABETES MELLITUS WITHOUT COMPLICATION, WITHOUT LONG-TERM CURRENT USE OF INSULIN: ICD-10-CM

## 2022-02-17 DIAGNOSIS — R91.8 MASS OF UPPER LOBE OF LEFT LUNG: ICD-10-CM

## 2022-02-17 DIAGNOSIS — C34.12 CANCER OF UPPER LOBE OF LEFT LUNG: ICD-10-CM

## 2022-02-17 PROCEDURE — 99999 PR PBB SHADOW E&M-EST. PATIENT-LVL V: CPT | Mod: PBBFAC,,, | Performed by: INTERNAL MEDICINE

## 2022-02-17 PROCEDURE — 99205 OFFICE O/P NEW HI 60 MIN: CPT | Mod: S$PBB,,, | Performed by: INTERNAL MEDICINE

## 2022-02-17 PROCEDURE — 99999 PR PBB SHADOW E&M-EST. PATIENT-LVL IV: CPT | Mod: PBBFAC,,, | Performed by: STUDENT IN AN ORGANIZED HEALTH CARE EDUCATION/TRAINING PROGRAM

## 2022-02-17 PROCEDURE — 99205 OFFICE O/P NEW HI 60 MIN: CPT | Mod: S$PBB,,, | Performed by: STUDENT IN AN ORGANIZED HEALTH CARE EDUCATION/TRAINING PROGRAM

## 2022-02-17 PROCEDURE — 99205 PR OFFICE/OUTPT VISIT, NEW, LEVL V, 60-74 MIN: ICD-10-PCS | Mod: S$PBB,,, | Performed by: INTERNAL MEDICINE

## 2022-02-17 PROCEDURE — 99205 PR OFFICE/OUTPT VISIT, NEW, LEVL V, 60-74 MIN: ICD-10-PCS | Mod: S$PBB,,, | Performed by: STUDENT IN AN ORGANIZED HEALTH CARE EDUCATION/TRAINING PROGRAM

## 2022-02-17 PROCEDURE — 99999 PR PBB SHADOW E&M-EST. PATIENT-LVL V: ICD-10-PCS | Mod: PBBFAC,,, | Performed by: INTERNAL MEDICINE

## 2022-02-17 PROCEDURE — 99215 OFFICE O/P EST HI 40 MIN: CPT | Mod: PBBFAC,27,PN | Performed by: INTERNAL MEDICINE

## 2022-02-17 PROCEDURE — 99214 OFFICE O/P EST MOD 30 MIN: CPT | Mod: PBBFAC,PN | Performed by: STUDENT IN AN ORGANIZED HEALTH CARE EDUCATION/TRAINING PROGRAM

## 2022-02-17 PROCEDURE — 99999 PR PBB SHADOW E&M-EST. PATIENT-LVL IV: ICD-10-PCS | Mod: PBBFAC,,, | Performed by: STUDENT IN AN ORGANIZED HEALTH CARE EDUCATION/TRAINING PROGRAM

## 2022-02-17 RX ORDER — FLUOXETINE HYDROCHLORIDE 40 MG/1
1 CAPSULE ORAL DAILY
COMMUNITY
Start: 2022-02-11 | End: 2022-02-17

## 2022-02-17 RX ORDER — FLUOXETINE HYDROCHLORIDE 20 MG/1
40 CAPSULE ORAL EVERY MORNING
Qty: 30 CAPSULE | Refills: 2 | Status: SHIPPED | OUTPATIENT
Start: 2022-02-17 | End: 2022-03-16

## 2022-02-17 RX ORDER — FLUTICASONE FUROATE AND VILANTEROL TRIFENATATE 100; 25 UG/1; UG/1
1 POWDER RESPIRATORY (INHALATION)
COMMUNITY
Start: 2022-02-11 | End: 2022-04-29 | Stop reason: SDUPTHER

## 2022-02-17 RX ORDER — TRAZODONE HYDROCHLORIDE 50 MG/1
50 TABLET ORAL
COMMUNITY
Start: 2022-02-11 | End: 2022-04-29

## 2022-02-17 RX ORDER — LIDOCAINE 50 MG/G
1 PATCH TOPICAL
COMMUNITY
Start: 2022-02-11 | End: 2022-03-13

## 2022-02-17 RX ORDER — FLUOXETINE 10 MG/1
20 CAPSULE ORAL NIGHTLY
Qty: 30 CAPSULE | Refills: 2 | Status: ON HOLD | OUTPATIENT
Start: 2022-02-17 | End: 2022-03-09

## 2022-02-17 RX ORDER — DICLOFENAC SODIUM 75 MG/1
75 TABLET, DELAYED RELEASE ORAL 2 TIMES DAILY
Status: ON HOLD | COMMUNITY
Start: 2022-02-16 | End: 2022-03-09

## 2022-02-17 RX ORDER — TRAZODONE HYDROCHLORIDE 50 MG/1
50 TABLET ORAL NIGHTLY
Status: ON HOLD | COMMUNITY
Start: 2022-02-11 | End: 2022-03-09 | Stop reason: CLARIF

## 2022-02-17 NOTE — PROGRESS NOTES
[facility] Consult Note    Referring provider: William Padgett Jr., DO    Diagnosis:  Teresa Earl is a 71 y.o. 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    Oncologic History:  · This was diagnosed on workup for cervical fusion 2/2 neck pain.  · 9/20/21: CT chest showed spiculated mass in anterior segment GRACIELA, multiple borderline/enlarged mediastinal lymph nodes.  · 11/5/21:  FVC 1.96 (61%), FEV1 1.47 (60%), TLC 73%, DLCO 9.18 (47%)  · 11/11/21: PET/CT with FDG avid, spiculated pleural based mass in the GRACIELA extending into the anterior mediastinum, measuring 3.5cm. SUV 8.5. no enlarged or FDG avid lymphadenopathy in chest. No distant mets but FDG avid L parotid lesion  · 12/13/21: Navigational bronch.   · Op note: no endobronchial disease medial GRACIELA sampled. EBUS for station 4L, 7, 11L  · Path: GRACIELA suspicious but not diagnostic for malignancy. 4L, 7, 11L negative for malignancy.  · 1/25/22: met with CT surgery, not a candidate for resection due to invasion of mediastinum.  · 2/12/22: MRI brain with no intracranial mets  · 2/14/22: CT Guided biopsy of GRACIELA demonstrating squamous cell carcinoma. Left parotid gland demonstrating pleomorphic adenoma     History of Present Illness:  Teresa Earl presents today to discuss definitive concurrent chemoradiation. Still has neck pain as she has not had her neck surgery. Denies chest or rib pain, hemoptysis. Has a chronic cough productive of clear sputum. Smoking 1/2 pack a day. She reports a 40 lb weight loss over the past year, potentially attributed to decreased appetite, stress, and change in diabetic meds. She has a lot of stress at home with taking care of family, and she has been referred to psychology. She has stable numbness in the hands from her cervical neck disease and neuropathy in her feet from diabetes. Has a hsitory of BCC near the right eye. No other cancers. No prior RT.    Possibility of pregnancy:  No  History of prior irradiation: No  History of prior systemic anti-cancer therapy: No  History of collagen vascular disease: No  Implanted electronic device (pacer/defib/nerve stimulator): No    Review of Systems:  ROS as above    Social History:  Social History     Tobacco Use    Smoking status: Current Every Day Smoker     Packs/day: 0.50     Types: Cigarettes    Smokeless tobacco: Never Used    Tobacco comment: Age Started: 15   Substance Use Topics    Alcohol use: No       Past Medical History:  Past Medical History:   Diagnosis Date    Acute kidney injury     Cervical radiculopathy     Cigarette smoker 1/26/2022    Diabetes mellitus     Hypertension     Mass of upper lobe of left lung 1/26/2022    Neuropathy     Rhabdomyolysis        Past Surgical History:   Procedure Laterality Date    BILATERAL TUBAL LIGATION      ENDOBRONCHIAL ULTRASOUND N/A 12/13/2021    Procedure: ENDOBRONCHIAL ULTRASOUN;  Surgeon: William Padgett Jr., DO;  Location: [facility];  Service: Pulmonary;  Laterality: N/A;    HYSTERECTOMY      NAVIGATIONAL BRONCHOSCOPY Left 12/13/2021    Procedure: BRONCHOSCOPY, NAVIGATIONAL;  Surgeon: William Padgett Jr., DO;  Location: [facility];  Service: Pulmonary;  Laterality: Left;       Cancer-related family history is not on file.    Medications:  Current Outpatient Medications on File Prior to Visit   Medication Sig Dispense Refill    albuterol (PROVENTIL/VENTOLIN HFA) 90 mcg/actuation inhaler Inhale 2 puffs into the lungs every 6 (six) hours as needed for Wheezing or Shortness of Breath. Rescue 1 g 5    BREO ELLIPTA 100-25 mcg/dose diskus inhaler Inhale 2 puffs into the lungs once daily.      buprenorphine HCL (SUBUTEX) 8 mg Subl Place 8 mg under the tongue once daily.      fluticasone furoate-vilanteroL (BREO ELLIPTA) 100-25 mcg/dose diskus inhaler Inhale 1 puff into the lungs.      gabapentin (NEURONTIN) 800 MG tablet Take 800 mg by mouth 3 (three) times daily.      JARDIANCE 25  "mg tablet Take 1 Tablet (25 mg) by mouth daily in the morning.      LIDOcaine (LIDODERM) 5 % apply ONE PATCH ONCE DAILY FOR 12 hours THEN REMOVE      lisinopriL (PRINIVIL,ZESTRIL) 2.5 MG tablet Take 2.5 mg by mouth once daily.      lovastatin (MEVACOR) 10 MG tablet Take 10 mg by mouth nightly.      metformin (GLUCOPHAGE) 1000 MG tablet Take 1,000 mg by mouth 2 (two) times daily with meals.      [DISCONTINUED] FLUoxetine 40 MG capsule Take 40 mg by mouth once daily.       No current facility-administered medications on file prior to visit.       Allergies:  Review of patient's allergies indicates:   Allergen Reactions    Codeine Hives and Other (See Comments)     Other reaction(s): Unknown  Other reaction(s): Unknown      Ibuprofen Swelling    Morphine Diarrhea and Other (See Comments)     Other reaction(s): Renal Dysfunctions  Other reaction(s): Renal Dysfunctions      Opioids - morphine analogues      Kidney injury       Exam:  Vitals:    02/17/22 1114   Weight: 68.4 kg (150 lb 12.7 oz)   Height: 5' 8" (1.727 m)     Constitutional: Pleasant 71 y.o. female in no acute distress.  Well nourished. Well groomed.   HEENT: Normocephalic and atraumatic   Cardiovascular: Upper extremities warm to touch  Lungs: No audible wheezing.  Normal effort.  Musculoskeletal: No gross MSK deformities.  Skin: No rashes appreciated.  Psych: Alert and oriented with appropriate mood and affect.  Neuro:  Grossly normal.    ECOG: (1) Restricted in physically strenuous activity, ambulatory and able to do work of light nature    Data Review:    Independent Interpretation of Test(s): PET/CT from 11/11/21 and images from CT guided biopsy were personally reviewed    Discussion with Another Provider or Non-healthcare Professional:  I discussed this case with Dr. Styles in order to coordinate care    Patient discussed at Tumor Board on 2/11/22    Assessment/Plan:   T4N0 unresectable IIIA NSCLC of the GRACIELA.   · Discussed treatment options to " include radiation therapy, systemic therapy, some combination thereof and no treatment. She has met with surgery and recommended against surgery.  · After this discussion, she elected to proceed with concurrent chemoradiation, with systemic therapy and adjuvant durva per medical oncology.   · The risks, benefits, scheduling, alternatives to and rationale of radiation therapy were explained in detail.  Consent was obtained and all questions were answered to the best of my ability.   · Given interval from prior PET/CT, will repeat CT CAP to rule out progression. If no further disease on CT CAP,I will plan to treat the GRACIELA, biopsy proven SCCa to a dose of 60 Gy in 30 fractions with concurrent chemotherapy.  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    Tobacco abuse. Discussed importance of cessation. Referral placed to smoking cessation.        Ziggy Sanders MD  Radiation Oncology

## 2022-02-17 NOTE — NURSING
Per Dr. Styles, patient to start Carbo/Taxol with concurrent radiation. Referrals made to palliative care, surgery for PAC and Dr. Childers. Notiffied Dr. Childers's MA of referral. Spoke with [facility]iative care regarding referral. Continuing to follow for arranging initiation of treatment    Oncology Navigation   Intake  Date of Diagnosis: 2/10/2022  Cancer Type: Thoracic  Internal / External Referral: Internal  Referral Source: Epic referral from Dr. Padgett  Date of Referral: 2/10/2022  Initial Nurse Navigator Contact: 2/11/2022  Referral to Initial Contact Timeline (days): 1  Date Worked: 2/17/2022     Treatment  Current Status: Staging work-up  Date Presented to Tumor Board: 2/10/2022  Presentation Notes: IR Bx GRACIELA mass and Lt parotid nodule as planned 02/14/2022  MRI brain w/ and w/o contrast   Med Onc, Rad Onc referral       Medical Oncologist: Dr. Mere Styles  Chemotherapy: Planned  Chemotherapy Regimen: NSCLC PACLITAXEL + CARBOPLATIN (AUC) QW + RADIATION    Radiation Therapy: Planned  Radiation Oncologist: Dr. Ziggy Sanders    Procedures: CT  CT Schedule Date: 3/2/2022  MRI Schedule Date: 2/12/2022          Radiation Oncologist: Dr. Ziggy Sanders        Acuity      Follow Up  No follow-ups on file.

## 2022-02-17 NOTE — PLAN OF CARE
START ON PATHWAY REGIMEN - Non-Small Cell Lung    [id number]        Paclitaxel (Taxol)       Carboplatin (Paraplatin)           Additional Orders: * All AUC calculations intended to be used in Marisela   formula    **Always confirm dose/schedule in your pharmacy ordering system**    Patient Characteristics:  Preoperative or Nonsurgical Candidate (Clinical Staging), Stage III -   Nonsurgical Candidate (Nonsquamous and Squamous), PS = 0, 1  Therapeutic Status: Preoperative or Nonsurgical Candidate (Clinical Staging)  AJCC T Category: cT4  AJCC N Category: cN0  AJCC M Category: cM0  AJCC 8 Stage Grouping: IIIA  ECOG Performance Status: 0  Intent of Therapy:  Curative Intent, Discussed with Patient

## 2022-02-17 NOTE — Clinical Note
Schedule CT chest, abdomen/pelvis next available. Schedule with general surgery for PORT placement Schedule consults with outpatient palliative care, psychology (Dr. Childers)  Please get auth for chemo and schedule chemo school. Schedule CBC,CMp, mag and and see me to start Carboplatin and Taxol on same day of Radiation (she is seeing, Dr. Sanders today and so check when they can start)

## 2022-02-17 NOTE — PROGRESS NOTES
"Subjective:       Patient ID: Teresa Earl is a 71 y.o. female.  Referring Physician: Dr. Dayron MD Pulmonary    Chief Complaint: Cancer of upper lobe of left lung  Mrs. Earl is a 71 year old female who underwent a chest xray in September 2022 prior to a discectomy and that showed a lung mass.     She then underwent CT chest also on September 2022 which showed spiculated mass in anterior segment GRACIELA, multiple borderline/enlarged mediastinal lymph nodes.    PFT's on 11/5/21 revealed FVC 1.96 (61%), FEV1 1.47 (60%), TLC 73%, DLCO 9.18 (47%)    PET/CT on 11/11/2022 reveals  FDG avid, spiculated pleural based mass in the GRACIELA extending into the anterior mediastinum, measuring 3.5cm. SUV 8.5. no enlarged or FDG avid lymphadenopathy in chest. No distant mets but FDG avid L parotid lesion    Navigational bronch on 12/13/2021 revealed "no endobronchial disease medial GRACIELA sampled. EBUS for station 4L, 7, 11. Path: GRACIELA suspicious but not diagnostic for malignancy. 4L, 7, 11L negative for malignancy"    On 1/25/22: he met with CT surgery and is felt not to be a candidate for resection due to invasion of mediastinum.    MRI brain on 2/12/2022 revealed  no intracranial mets    CT Guided biopsy on 2/24/2022 reveals  of GRACIELA demonstrating squamous cell carcinoma. Left parotid gland demonstrating pleomorphic adenoma  .    HPIShe comes in to discuss further manangement. She notes pain from her spinal stenosis as she still has not had surgery. She denies any new chest pain. 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 She did loose 40 lbs weight which she attributes to stress and her diabetic meds.    She is on Suboxone and is being managed by her pain management doctor  ECOG PS is zero. She is here with her daughter.      Review of Systems   Constitutional: Negative for appetite change, fatigue and unexpected weight change.   HENT: Negative for " mouth sores.    Eyes: Negative for visual disturbance.   Respiratory: Positive for cough and shortness of breath. Negative for choking.    Cardiovascular: Negative for chest pain.   Gastrointestinal: Negative for abdominal pain and diarrhea.   Genitourinary: Negative for frequency.   Musculoskeletal: Positive for back pain and neck pain.   Integumentary:  Negative for rash.   Neurological: Negative for headaches.   Hematological: Negative for adenopathy.   Psychiatric/Behavioral: The patient is not nervous/anxious.    All other systems reviewed and are negative.        Objective:      Physical Exam  Vitals reviewed.   Constitutional:       Appearance: She is well-developed and well-nourished.   HENT:      Mouth/Throat:      Pharynx: No oropharyngeal exudate.   Cardiovascular:      Rate and Rhythm: Normal rate.      Heart sounds: Normal heart sounds.   Pulmonary:      Effort: Pulmonary effort is normal.      Breath sounds: Normal breath sounds. No wheezing.   Abdominal:      General: Bowel sounds are normal.      Palpations: Abdomen is soft.      Tenderness: There is no abdominal tenderness.   Musculoskeletal:         General: No tenderness or edema.   Lymphadenopathy:      Cervical: No cervical adenopathy.   Skin:     General: Skin is warm and dry.      Findings: No rash.   Neurological:      Mental Status: She is alert and oriented to person, place, and time.      Coordination: Coordination normal.   Psychiatric:         Mood and Affect: Mood and affect normal.         Thought Content: Thought content normal.         Judgment: Judgment normal.               LABS:  WBC   Date Value Ref Range Status   02/14/2022 14.22 (H) 3.90 - 12.70 K/uL Final     Hemoglobin   Date Value Ref Range Status   02/14/2022 13.0 12.0 - 16.0 g/dL Final     Hematocrit   Date Value Ref Range Status   02/14/2022 42.0 37.0 - 48.5 % Final     Platelets   Date Value Ref Range Status   02/14/2022 328 150 - 450 K/uL Final     Gran # (ANC)   Date  Value Ref Range Status   07/29/2016 13.3 (H) 1.8 - 7.7 K/uL Final     Gran %   Date Value Ref Range Status   07/29/2016 76.1 (H) 38.0 - 73.0 % Final       Chemistry        Component Value Date/Time     12/13/2021 0835    K 4.6 12/13/2021 0835     12/13/2021 0835    CO2 29 12/13/2021 0835    BUN 18 02/14/2022 0750    CREATININE 1.12 02/14/2022 0750     (H) 12/13/2021 0835        Component Value Date/Time    CALCIUM 9.5 12/13/2021 0835    ALKPHOS 74 07/27/2016 0252    AST 23 07/27/2016 0252    ALT 24 07/27/2016 0252    BILITOT 0.4 07/27/2016 0252    ESTGFRAFRICA 57 (A) 02/14/2022 0750    EGFRNONAA 50 (A) 02/14/2022 0750          Assessment:       Problem List Items Addressed This Visit     Mass of upper lobe of left lung - Primary    Relevant Medications    FLUoxetine 20 MG capsule    FLUoxetine 10 MG capsule    Type 2 diabetes mellitus without complication      Other Visit Diagnoses     Current moderate episode of major depressive disorder, unspecified whether recurrent        Relevant Orders    Ambulatory referral/consult to Hematology/Oncology/Psychology          Plan:        Reviewed scans, She has at least a stage III non small cell lung cancer. Will obtain baseline restaging CT scans prior to starting treatment. Discussed definitive chemo/RT with weekly Carboplatin and Taxol followed by consolidation Imfinzi.  Will refer to general surgery for PORT placement    Above care plan was discussed with patient and accompanying wife and all questions were addressed to their satisfaction

## 2022-02-18 ENCOUNTER — OFFICE VISIT (OUTPATIENT)
Dept: PSYCHIATRY | Facility: CLINIC | Age: 72
End: 2022-02-18
Payer: MEDICARE

## 2022-02-18 DIAGNOSIS — F43.23 ADJUSTMENT DISORDER WITH MIXED ANXIETY AND DEPRESSED MOOD: ICD-10-CM

## 2022-02-18 PROCEDURE — 90791 PR PSYCHIATRIC DIAGNOSTIC EVALUATION: ICD-10-PCS | Mod: S$PBB,,, | Performed by: CASE MANAGER/CARE COORDINATOR

## 2022-02-18 PROCEDURE — 90791 PSYCH DIAGNOSTIC EVALUATION: CPT | Mod: S$PBB,,, | Performed by: CASE MANAGER/CARE COORDINATOR

## 2022-02-18 NOTE — Clinical Note
Thinking this pt would benefit from acupuncture for pain management. How do I put a referral in for integrative oncology here?

## 2022-02-18 NOTE — Clinical Note
Please call and schedule for follow-up in two to three weeks depending on availability and when she starts chemo.

## 2022-02-18 NOTE — PROGRESS NOTES
PSYCHO-ONCOLOGY INTAKE    Diagnostic Interview - CPT 98881    Date: 2/18/2022  Site: [place], [place]    Evaluation Length (direct face-to-face time):  45 minutes     Referral Source: Mere Styles MD   Oncologist: Mere Styles MD    Clinical status of patient: Outpatient    Teresa Earl, a 71 y.o. female, seen for initial evaluation visit.    Teresa Earl reviewed and agreed to informed consent and the limits of confidentiality.    Chief complaint/reason for encounter: adjustment to illness, depression and anxiety    Medical/Surgical History:    Patient Active Problem List   Diagnosis    Opiate overdose    Acute kidney injury    Encephalopathy    Nausea and vomiting    Cervical radiculopathy    Type 2 diabetes mellitus without complication    Leukocytosis    Viral illness    Mass of upper lobe of left lung    Cigarette smoker    Cancer of upper lobe of left lung    Malignant neoplasm of upper lobe of left lung       Health Behaviors:       ETOH Use: No       Tobacco Use: Yes, reportedly smoke 1/2 a pack of cigarettes daily   Illicit Drug Use:  No     Prescription Misuse: No   Caffeine: minimal   Exercise:The patient engages in little, if any physical activity.   Firearms:  No   Advanced directives:No     Family History:   Psychiatric illness: No     Alcohol/Drug Abuse: Yes, noted that her brother had substance abuse     Suicide: No      Past Psychiatric History:   Inpatient treatment: Not reported     Outpatient treatment: No     Prior substance abuse treatment: Patient uses Suboxone being she stated she started over using opioids a few years ago     Suicide Attempts: No     Psychotropic Medications:  Current: Desyrel and Prozac          Current medications as per below, allergies reviewed in chart.    Current Outpatient Medications   Medication    albuterol (PROVENTIL/VENTOLIN HFA) 90 mcg/actuation inhaler    BREO ELLIPTA 100-25 mcg/dose diskus inhaler    buprenorphine HCL (SUBUTEX) 8 mg Subl     diclofenac (VOLTAREN) 75 MG EC tablet    empagliflozin (JARDIANCE) 25 mg tablet    FLUoxetine 10 MG capsule    FLUoxetine 20 MG capsule    fluticasone furoate-vilanteroL (BREO ELLIPTA) 100-25 mcg/dose diskus inhaler    fluticasone furoate-vilanteroL (BREO ELLIPTA) 100-25 mcg/dose diskus inhaler    gabapentin (NEURONTIN) 800 MG tablet    JARDIANCE 25 mg tablet    LIDOcaine (LIDODERM) 5 %    LIDOcaine (LIDODERM) 5 %    lisinopriL (PRINIVIL,ZESTRIL) 2.5 MG tablet    lovastatin (MEVACOR) 10 MG tablet    metformin (GLUCOPHAGE) 1000 MG tablet    traZODone (DESYREL) 50 MG tablet    traZODone (DESYREL) 50 MG tablet     No current facility-administered medications for this visit.              Social situation/Stressors: Teresa Earl lives with her partner, daughter, and grandson in [place], [place].  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   Teresa Earl has three children and has been with her partner since 1987.  The patient reports good social support primarily through her family. Patient shared that her daughter who lives with her is her primary support. Patient noted that she is currently in the process of finding a place for her mother to live. She stated that her mother is currently bedridden and requires support. She noted her mother is currently in a short term facility and is working on figuring out where she will go from there.  Teresa Earl's hobbies include watching movies. Patient noted difficulty thinking of activities that she enjoys.  Additional stressors: Helping manage her mother's care    Strengths:Able to vocalize needs and familial support  Liabilities: Chronic pain    Current Evaluation:     Mental Status Exam: Teresa Earl arrived promptly for the assessment session.  The patient was fully cooperative throughout the interview and was an adequate historian   Appearance: age appropriate, appropriately   dressed, adequately  groomed  Behavior/Cooperation: friendly and cooperative  Speech: normal in rate, volume, and tone and appropriate quality, quantity and organization of sentences  Mood: euthymic  Affect: mood congruent and appropriate, appeared to have diminished mood for part of the session as evidenced by her tearing up  Thought Process: goal-directed, logical  Thought Content: normal, no suicidality, no homicidality, delusions, or paranoia;did not appear to be responding to internal stimuli during the interview.   Orientation: grossly intact  Memory: grossly intact  Attention Span/Concentration: Attends to interview without distraction; reports some difficulty  Fund of Knowledge: average  Estimate of Intelligence: average from verbal skills and history  Cognition: grossly intact  Insight: patient has awareness of illness; good insight into own behavior and behavior of others  Judgment: the patient's behavior is adequate to circumstances      History of present illness:    Oncology History   Cancer of upper lobe of left lung   2/17/2022 Initial Diagnosis    Cancer of upper lobe of left lung     2/17/2022 Cancer Staged    Staging form: Lung, AJCC 8th Edition  - Clinical stage from 2/17/2022: Stage IIIA (cT4, cN0, cM0)     Malignant neoplasm of upper lobe of left lung   2/17/2022 Initial Diagnosis    Malignant neoplasm of upper lobe of left lung     3/1/2022 -  Chemotherapy    Treatment Summary   Plan Name: OP NSCLC PACLITAXEL + CARBOPLATIN (AUC) QW + RADIATION  Treatment Goal: Curative  Status: Active  Start Date: 3/1/2022 (Planned)  End Date: 4/5/2022 (Planned)  Provider: Mere Styles MD  Chemotherapy: CARBOplatin (PARAPLATIN) in sodium chloride 0.9% 250 mL chemo infusion, , Intravenous, Clinic/HOD 1 time, 0 of 6 cycles  PACLitaxeL (TAXOL) 45 mg/m2 = 84 mg in sodium chloride 0.9% 250 mL chemo infusion, 45 mg/m2, Intravenous, Clinic/HOD 1 time, 0 of 6 cycles           Teresa Rajat has adjusted to illness with  moderate difficulty primarily through focus on family. She has engaged in some information gathering.  The patient has good family support.  Her support system is coping with slight difficulty with her diagnosis and proposed course of treatment. Illness-related psychosocial stressors include delay in surgery to help with her chronic pain.  The patient has a good partnership with her [facility] treatment team. The patient did not report any barriers to her Cancer treatment at this time.  Symptoms:   · Mood: tearfulness and diminished mood at times; no SI/HI; Most recent NCCN Distress score = 8 (2/17/2022)  · Anxiety: Restlessness and Irritability; Worry about her mother; denied physical symptoms of anxiety  · Substance abuse: Reportedly used to over use opioids and currently takes Suboxone  · Cognitive functioning: Reported some  difficulties with memory at times  · Sleep: ; interrupted sleep and sleep interrupted by pain; sleep maintenance difficulties and sometimes cries at night; takes Desyrel for sleep  · Pain: Ms. Earl reports pain in her neck, shoulders, and down her arms. Reported some involuntary jerks due to the pain. She rated the pain today as a 4/10 (10 being the worst pain). Patient stated the worst the pain gets is an 8/10 and that it sometimes goes down to a 0/10. She reported that she was going to get surgery, but is delaying surgery due to Cancer treatment.      Assessment - Diagnosis - Goals:       ICD-10-CM ICD-9-CM   1. Adjustment disorder with mixed anxiety and depressed mood  F43.23 309.28         Plan:individual psychotherapy    Summary and Recommendations  Teresa Earl is a 71 y.o. female referred by Mere Styles MD for psychological evaluation and treatment.  Ms. Earl appears to be having moderate difficulty coping with her diagnosis and proposed treatment course. She also has stress related to helping care for her mother. Discussed the definition of CBT and how skills  from it can help increase positive mood. She is interested in CBT to address depression and anxiety and will follow up with me for that purpose. Mood protective strategies during cancer treatment were discussed.   She was encouraged to increase pleasant events scheduling. Patient shared that she plans on doing activities she enjoys more often like possibly watching a movie when she goes home today. Taught patient diaphragmatic breathing to help with relaxation. Patient appeared open to practicing this on her own. Patient was advised to practice this exercise when she is relaxed so it is more likely to be beneficial when she needs it.    Return to clinic: two to three weeks    GOALS:   Pleasant Event Scheduling  Practice Diaphragmatic Deep Breathing  Learn to use CBT to increase positive thoughts (Future)        Jos Mayfield Psy.D.  [place] License #[id number]             Ryan Childers Psy.D.  Clinical Psychologist  [place] License #[id number]  [place] License #[id number]

## 2022-02-21 ENCOUNTER — TELEPHONE (OUTPATIENT)
Dept: HEMATOLOGY/ONCOLOGY | Facility: CLINIC | Age: 72
End: 2022-02-21
Payer: MEDICARE

## 2022-02-21 DIAGNOSIS — C34.12 MALIGNANT NEOPLASM OF UPPER LOBE OF LEFT LUNG: Primary | ICD-10-CM

## 2022-02-21 NOTE — NURSING
Chemo Ed/SW and Dietician Referrals scheduled for after radiation simulation. Provided appointment info and patient verbalized understanding. She has Port consult on 2/23/22

## 2022-02-22 ENCOUNTER — TELEPHONE (OUTPATIENT)
Dept: HEMATOLOGY/ONCOLOGY | Facility: CLINIC | Age: 72
End: 2022-02-22
Payer: MEDICARE

## 2022-02-22 DIAGNOSIS — C34.12 MALIGNANT NEOPLASM OF UPPER LOBE OF LEFT LUNG: Primary | ICD-10-CM

## 2022-02-22 NOTE — NURSING
Followed up with [facility] palliative care to check on status of referral and they will reach out to patient to schedule

## 2022-02-23 ENCOUNTER — OFFICE VISIT (OUTPATIENT)
Dept: SURGERY | Facility: CLINIC | Age: 72
End: 2022-02-23
Attending: INTERNAL MEDICINE
Payer: MEDICARE

## 2022-02-23 ENCOUNTER — HOSPITAL ENCOUNTER (OUTPATIENT)
Dept: RADIOLOGY | Facility: HOSPITAL | Age: 72
Discharge: HOME | End: 2022-02-23
Attending: INTERNAL MEDICINE
Payer: MEDICARE

## 2022-02-23 VITALS
BODY MASS INDEX: 22.19 KG/M2 | DIASTOLIC BLOOD PRESSURE: 76 MMHG | TEMPERATURE: 99 F | WEIGHT: 146.38 LBS | HEART RATE: 88 BPM | HEIGHT: 68 IN | SYSTOLIC BLOOD PRESSURE: 122 MMHG

## 2022-02-23 DIAGNOSIS — C34.90 MALIGNANT NEOPLASM OF LUNG, UNSPECIFIED LATERALITY, UNSPECIFIED PART OF LUNG: Primary | ICD-10-CM

## 2022-02-23 DIAGNOSIS — C34.12 MALIGNANT NEOPLASM OF UPPER LOBE OF LEFT LUNG: ICD-10-CM

## 2022-02-23 DIAGNOSIS — Z01.818 PRE-OP TESTING: ICD-10-CM

## 2022-02-23 PROCEDURE — 71260 CT CHEST ABDOMEN PELVIS WITH CONTRAST (XPD): ICD-10-PCS | Mod: 26,,, | Performed by: RADIOLOGY

## 2022-02-23 PROCEDURE — 74177 CT ABD & PELVIS W/CONTRAST: CPT | Mod: TC,PO

## 2022-02-23 PROCEDURE — A9698 NON-RAD CONTRAST MATERIALNOC: HCPCS | Mod: PO | Performed by: INTERNAL MEDICINE

## 2022-02-23 PROCEDURE — 99214 OFFICE O/P EST MOD 30 MIN: CPT | Mod: PBBFAC,PO,25 | Performed by: SURGERY

## 2022-02-23 PROCEDURE — 99999 PR PBB SHADOW E&M-EST. PATIENT-LVL IV: CPT | Mod: PBBFAC,,, | Performed by: SURGERY

## 2022-02-23 PROCEDURE — 99999 PR PBB SHADOW E&M-EST. PATIENT-LVL IV: ICD-10-PCS | Mod: PBBFAC,,, | Performed by: SURGERY

## 2022-02-23 PROCEDURE — 71260 CT THORAX DX C+: CPT | Mod: 26,,, | Performed by: RADIOLOGY

## 2022-02-23 PROCEDURE — 99204 OFFICE O/P NEW MOD 45 MIN: CPT | Mod: S$PBB,,, | Performed by: SURGERY

## 2022-02-23 PROCEDURE — 71260 CT THORAX DX C+: CPT | Mod: TC,PO

## 2022-02-23 PROCEDURE — 74177 CT CHEST ABDOMEN PELVIS WITH CONTRAST (XPD): ICD-10-PCS | Mod: 26,,, | Performed by: RADIOLOGY

## 2022-02-23 PROCEDURE — 25500020 PHARM REV CODE 255: Mod: PO | Performed by: INTERNAL MEDICINE

## 2022-02-23 PROCEDURE — 74177 CT ABD & PELVIS W/CONTRAST: CPT | Mod: 26,,, | Performed by: RADIOLOGY

## 2022-02-23 PROCEDURE — 99204 PR OFFICE/OUTPT VISIT, NEW, LEVL IV, 45-59 MIN: ICD-10-PCS | Mod: S$PBB,,, | Performed by: SURGERY

## 2022-02-23 RX ADMIN — IOHEXOL 75 ML: 350 INJECTION, SOLUTION INTRAVENOUS at 03:02

## 2022-02-23 RX ADMIN — IOHEXOL 1000 ML: 9 SOLUTION ORAL at 03:02

## 2022-02-23 NOTE — PATIENT INSTRUCTIONS
Surgery is scheduled for 3/9/22 arrival time will be given by the the preop nurse.  The preop nurse will call you from [phone]  Nothing to eat or drink after midnight.  Someone to drive you home if you are same day surgery.    THE PREOP NURSE WILL CALL, SOMETIMES AS LATE AS 4 or 5 PM IN THE AFTERNOON THE DAY BEFORE SURGERY.    Bathe the night before and the morning of your procedure with a Chlorhexidine wash such as Hibiclens, can be purchased at most Pharmacy's no prescription needed.    Special Instruction:     Your surgery is scheduled at the [facility] at [address] [facility] [address] in [place] on the first floor.      COVID-19 Test 72 hours before procedure.     Contact Luis Stephenson LPN for any questions or concerns. [phone]

## 2022-02-23 NOTE — Clinical Note
I saw your patient, Teresa Earl in the office.  Attached are my findings and plan.  Thank you for referring her to my office and if you have any questions please do not hesitate to call my cell [phone].  Carlos Solitario

## 2022-02-23 NOTE — H&P (VIEW-ONLY)
Subjective:       Patient ID: Teresa Earl is a 71 y.o. female.    Chief Complaint: Consult (Port placement)    HPI   this is a pleasant 71-year-old female who was referred to me for evaluation of port placement.  Patient was recently diagnosed with stage III non-small cell lung cancer of the left upper lobe.  Patient is not a minimal to surgical care at the present time.  She has been scheduled for definitive chemo radiation therapy.  She is referred to me for consideration of a port placement.  Patient has never been on dialysis.  She has no history of surgery there chest.  Her past medical history is also significant for diabetes  Review of Systems   Constitutional: Positive for fatigue and unexpected weight change.   Respiratory: Positive for shortness of breath.    Cardiovascular: Negative for chest pain.   Gastrointestinal: Negative for abdominal distention and abdominal pain.   Neurological: Positive for disturbances in coordination and coordination difficulties.         Objective:      Physical Exam  Vitals reviewed.   Cardiovascular:      Rate and Rhythm: Normal rate.      Pulses: Normal pulses.   Pulmonary:      Effort: Pulmonary effort is normal. No respiratory distress.   Abdominal:      General: Abdomen is flat. Bowel sounds are normal. There is no distension.      Tenderness: There is no abdominal tenderness.   Musculoskeletal:      Cervical back: Normal range of motion.   Neurological:      Mental Status: She is alert.         Assessment:     non-small cell lung cancer  Problem List Items Addressed This Visit    None     Visit Diagnoses     Pre-op testing        Relevant Orders    COVID-19 Routine Screening          Plan:       Patient scheduled to begin chemo radiation.  She is in need of a port.  I have offered port placement on Monday the 7th in [place].  Patient asked to have this done in [place] on Wednesday the 9th.  Discussed that I am willing to perform surgery at that time.  Informed  consent has been obtained.  Surgery scheduled for Wednesday the 9th.

## 2022-02-23 NOTE — PROGRESS NOTES
Subjective:       Patient ID: Teresa Earl is a 71 y.o. female.    Chief Complaint: Consult (Port placement)    HPI   this is a pleasant 71-year-old female who was referred to me for evaluation of port placement.  Patient was recently diagnosed with stage III non-small cell lung cancer of the left upper lobe.  Patient is not a minimal to surgical care at the present time.  She has been scheduled for definitive chemo radiation therapy.  She is referred to me for consideration of a port placement.  Patient has never been on dialysis.  She has no history of surgery there chest.  Her past medical history is also significant for diabetes  Review of Systems   Constitutional: Positive for fatigue and unexpected weight change.   Respiratory: Positive for shortness of breath.    Cardiovascular: Negative for chest pain.   Gastrointestinal: Negative for abdominal distention and abdominal pain.   Neurological: Positive for disturbances in coordination and coordination difficulties.         Objective:      Physical Exam  Vitals reviewed.   Cardiovascular:      Rate and Rhythm: Normal rate.      Pulses: Normal pulses.   Pulmonary:      Effort: Pulmonary effort is normal. No respiratory distress.   Abdominal:      General: Abdomen is flat. Bowel sounds are normal. There is no distension.      Tenderness: There is no abdominal tenderness.   Musculoskeletal:      Cervical back: Normal range of motion.   Neurological:      Mental Status: She is alert.         Assessment:     non-small cell lung cancer  Problem List Items Addressed This Visit    None     Visit Diagnoses     Pre-op testing        Relevant Orders    COVID-19 Routine Screening          Plan:       Patient scheduled to begin chemo radiation.  She is in need of a port.  I have offered port placement on Monday the 7th in [place].  Patient asked to have this done in [place] on Wednesday the 9th.  Discussed that I am willing to perform surgery at that time.  Informed  consent has been obtained.  Surgery scheduled for Wednesday the 9th.

## 2022-02-24 ENCOUNTER — DOCUMENTATION ONLY (OUTPATIENT)
Dept: INFUSION THERAPY | Facility: HOSPITAL | Age: 72
End: 2022-02-24
Payer: MEDICARE

## 2022-02-24 ENCOUNTER — TELEPHONE (OUTPATIENT)
Dept: RADIATION ONCOLOGY | Facility: CLINIC | Age: 72
End: 2022-02-24
Payer: MEDICARE

## 2022-02-24 ENCOUNTER — CLINICAL SUPPORT (OUTPATIENT)
Dept: HEMATOLOGY/ONCOLOGY | Facility: CLINIC | Age: 72
End: 2022-02-24
Payer: MEDICARE

## 2022-02-24 ENCOUNTER — TELEPHONE (OUTPATIENT)
Dept: HEMATOLOGY/ONCOLOGY | Facility: CLINIC | Age: 72
End: 2022-02-24
Payer: MEDICARE

## 2022-02-24 VITALS
HEIGHT: 68 IN | TEMPERATURE: 97 F | DIASTOLIC BLOOD PRESSURE: 72 MMHG | RESPIRATION RATE: 16 BRPM | OXYGEN SATURATION: 93 % | BODY MASS INDEX: 22.16 KG/M2 | WEIGHT: 146.19 LBS | SYSTOLIC BLOOD PRESSURE: 113 MMHG | HEART RATE: 89 BPM

## 2022-02-24 DIAGNOSIS — R93.89 ABNORMAL CT SCAN: Primary | ICD-10-CM

## 2022-02-24 DIAGNOSIS — C34.12 MALIGNANT NEOPLASM OF UPPER LOBE OF LEFT LUNG: ICD-10-CM

## 2022-02-24 PROCEDURE — 99215 OFFICE O/P EST HI 40 MIN: CPT | Mod: PBBFAC,PN

## 2022-02-24 PROCEDURE — 99999 PR PBB SHADOW E&M-EST. PATIENT-LVL V: CPT | Mod: PBBFAC,,,

## 2022-02-24 PROCEDURE — 99999 PR PBB SHADOW E&M-EST. PATIENT-LVL V: ICD-10-PCS | Mod: PBBFAC,,,

## 2022-02-24 RX ORDER — PROMETHAZINE HYDROCHLORIDE 12.5 MG/1
12.5 TABLET ORAL EVERY 6 HOURS PRN
Qty: 30 TABLET | Refills: 0 | Status: SHIPPED | OUTPATIENT
Start: 2022-02-24 | End: 2023-01-01 | Stop reason: SDUPTHER

## 2022-02-24 RX ORDER — SODIUM CHLORIDE 9 MG/ML
INJECTION, SOLUTION INTRAVENOUS CONTINUOUS
Status: CANCELLED | OUTPATIENT
Start: 2022-02-24

## 2022-02-24 RX ORDER — LIDOCAINE AND PRILOCAINE 25; 25 MG/G; MG/G
CREAM TOPICAL
Qty: 30 G | Refills: 0 | Status: SHIPPED | OUTPATIENT
Start: 2022-02-24 | End: 2023-01-01

## 2022-02-24 NOTE — PROGRESS NOTES
Oncology Nutrition   New Patient Education  Teresa Earl   1950    Nutrition Education   This is a 71 y.o.female with a medical diagnosis of lung cancer.     Met w/ pt to discuss current nutritional status and nutrition as it relates to cancer and cancer treatment. Pt currently mild nutrition risk. Pt with major weight loss over the last few years but for the last 4-5 months it has been stable per chart below. Pt has been struggling with diabetes and dental struggles, which attributed to the original weight loss. Pt now has dentures and has figured out his DM medications. Per diet recall, patient likely not meeting estimated nutrient needs. Pt reports skipping breakfast, large lunch meal (protein, carb, veggie), and a smaller meal such as cereal for dinner. RD provided patient with ways to increase caloric intake and encouraged pt try Glucerna or other ONS to help meet estimated nutrient needs. Pt VU.   Daughter and patient's friend present during visit.    Wt Readings from Last 10 Encounters:   02/24/22 66.3 kg (146 lb 2.6 oz)   02/23/22 66.4 kg (146 lb 6.2 oz)   02/17/22 68.4 kg (150 lb 12.7 oz)   02/17/22 68.6 kg (151 lb 3.8 oz)   02/14/22 67.6 kg (149 lb)   02/07/22 68.9 kg (152 lb)   01/25/22 67.4 kg (148 lb 9.4 oz)   12/13/21 65.7 kg (144 lb 13.5 oz)   11/05/21 67.6 kg (149 lb)   07/28/16 83.9 kg (184 lb 15.5 oz)      [x] PMHx reviewed  [x] Labs reviewed    Educated on food safety and common nutrition impact symptoms associated with chemotherapy treatment. Reinforced the importance of good hydration. Handouts provided.    Answered all nutrition related questions.     Patient provided with dietitian contact number and advised to call with questions or make future appointment if further intervention is needed. RD to follow throughout tx.    Jessica Gentile, MS, RD, LDN  02/24/2022  2:42 PM

## 2022-02-24 NOTE — ADDENDUM NOTE
Addended by: JOANNE ALANIZ on: 2/24/2022 03:19 PM     Modules accepted: Serafin, SmartSet     Alert and oriented to person, place, time/situation. normal mood and affect. no apparent risk to self or others.

## 2022-02-24 NOTE — TELEPHONE ENCOUNTER
Spoke with patient.  Explained to patient dr dickens wants a MRI abdomen of liver to better see the spot on the liver on the CT, though not seen on PET.  Scheduled her for next Thursday.  She thanked nurse and voiced understanding.

## 2022-02-24 NOTE — PROGRESS NOTES
Oncology   [employer]otherapy [employer] Education  Teresa Earl   1950    Social Service Education   This is a 71 y.o.female with a medical diagnosis of lung cancer.    Current Support System: The pt was accompanied by one of her daughter, Chela and a family friend. The pt reported living with her life partner of 35 years and they have an adult son with special needs.    Met with pt for new pt education. Reviewed role of  during cancer treatment. Educated on role of SW on care team and resources available at the cancer center.     Answered all psychosocial/socioeconomic related questions.   The pt's daughter expressed concerns regarding finances for her mom.  This LCSW explained to the pt that I can provide her with the financial counselor's contact information, so she can make an appointment to discuss financial assistance programs for medical bills.    Further, explained our services of the T[facility].  The pt was receptive and agreeable to fill out applications with me when she returns to the cancer center for her treatment.    The daughter also expressed concern regarding her mom having to be here every day for radiation for 6 weeks and the challenge of transportation bc her sister is a teacher and Chela lives in [place].    This LCSW acknowledged that this is a concern and explained that Medicaid does provide transportation, however it is not very reliable so it would be best to rely on friends and family members and we can help with providing gas cards to assist with the expense of travel.  The daughter and patient were agreeable tot his plan.  Patient provided with social contact number and advised to call with questions or make future appointment if further intervention is needed. SW to follow throughout tx.    Social work will continue to follow      Carla Martinez LCSW  02/24/2022  2:14 PM

## 2022-02-24 NOTE — PROGRESS NOTES
"Subjective:      Name: Teresa Earl  : 1950  MRN: [id number]  CC: lung cancer     HPI:   Teresa Earl is a 71 y.o. female presents for education and preparation of treatment with weekly Carboplatin/Taxol x 6 with XRT followed by Imfinzi. She is accompanied today by her daughter and good friend.   Continues to have pain related to spinal stenosis and is on Suboxone. A referral was previously made to palliative care to assist with symptom management. Mentions that she has peripheral neuropathy in LE that has been long standing. Continues to experience cough.   Denies HA, CP, abd pain, changes in bowel habits, changes in urinary frequency, hematuria, swelling, LAD.     Prior History:  Mrs. Earl is a 71 year old female who underwent a chest xray in 2022 prior to a discectomy and that showed a lung mass.      She then underwent CT chest also on 2022 which showed spiculated mass in anterior segment GRACIELA, multiple borderline/enlarged mediastinal lymph nodes.     PFT's on 21 revealed FVC 1.96 (61%), FEV1 1.47 (60%), TLC 73%, DLCO 9.18 (47%)     PET/CT on 2022 reveals  FDG avid, spiculated pleural based mass in the GRACIELA extending into the anterior mediastinum, measuring 3.5cm. SUV 8.5. no enlarged or FDG avid lymphadenopathy in chest. No distant mets but FDG avid L parotid lesion     Navigational bronch on 2021 revealed "no endobronchial disease medial GRACIELA sampled. EBUS for station 4L, 7, 11. Path: GRACIELA suspicious but not diagnostic for malignancy. 4L, 7, 11L negative for malignancy"     On 22: he met with CT surgery and is felt not to be a candidate for resection due to invasion of mediastinum.     MRI brain on 2022 revealed  no intracranial mets     CT Guided biopsy on 2022 reveals  of GRACIELA demonstrating squamous cell carcinoma. Left parotid gland demonstrating pleomorphic adenoma    Oncology History   Cancer of upper lobe of left lung   2022 Initial Diagnosis    " Cancer of upper lobe of left lung     2/17/2022 Cancer Staged    Staging form: Lung, AJCC 8th Edition  - Clinical stage from 2/17/2022: Stage IIIA (cT4, cN0, cM0)     Malignant neoplasm of upper lobe of left lung   2/17/2022 Initial Diagnosis    Malignant neoplasm of upper lobe of left lung     3/1/2022 -  Chemotherapy    Treatment Summary   Plan Name: OP NSCLC PACLITAXEL + CARBOPLATIN (AUC) QW + RADIATION  Treatment Goal: Curative  Status: Active  Start Date: 3/1/2022 (Planned)  End Date: 4/5/2022 (Planned)  Provider: Mere Styles MD  Chemotherapy: CARBOplatin (PARAPLATIN) in sodium chloride 0.9% 250 mL chemo infusion, , Intravenous, Clinic/HOD 1 time, 0 of 6 cycles  PACLitaxeL (TAXOL) 45 mg/m2 = 84 mg in sodium chloride 0.9% 250 mL chemo infusion, 45 mg/m2, Intravenous, Clinic/HOD 1 time, 0 of 6 cycles            Past Medical History:   Diagnosis Date    Acute kidney injury     Cervical radiculopathy     Cigarette smoker 1/26/2022    Diabetes mellitus     Hypertension     Mass of upper lobe of left lung 1/26/2022    Neuropathy     Rhabdomyolysis        Past Surgical History:   Procedure Laterality Date    BILATERAL TUBAL LIGATION      ENDOBRONCHIAL ULTRASOUND N/A 12/13/2021    Procedure: ENDOBRONCHIAL ULTRASOUN;  Surgeon: William Padgett Jr., DO;  Location: [facility];  Service: Pulmonary;  Laterality: N/A;    HYSTERECTOMY      NAVIGATIONAL BRONCHOSCOPY Left 12/13/2021    Procedure: BRONCHOSCOPY, NAVIGATIONAL;  Surgeon: William Padgett Jr., DO;  Location: [facility];  Service: Pulmonary;  Laterality: Left;       Family History   Problem Relation Age of Onset    Valvular heart disease Mother        Social History     Socioeconomic History    Marital status: Single   Tobacco Use    Smoking status: Current Every Day Smoker     Packs/day: 0.50     Types: Cigarettes    Smokeless tobacco: Never Used    Tobacco comment: Age Started: 15   Substance and Sexual Activity    Alcohol use: No       Review of  "patient's allergies indicates:   Allergen Reactions    Codeine Hives and Other (See Comments)     Other reaction(s): Unknown  Other reaction(s): Unknown      Ibuprofen Swelling    Morphine Diarrhea and Other (See Comments)     Other reaction(s): Renal Dysfunctions  Other reaction(s): Renal Dysfunctions      Opioids - morphine analogues      Kidney injury       Review of Systems   Constitutional: Positive for weight loss. Negative for decreased appetite and malaise/fatigue.   HENT: Negative for sore throat.    Eyes: Negative for visual disturbance.   Cardiovascular: Negative for chest pain, dyspnea on exertion and palpitations.   Respiratory: Positive for cough and shortness of breath.    Hematologic/Lymphatic: Negative for adenopathy. Does not bruise/bleed easily.   Skin: Negative for itching and rash.   Musculoskeletal: Positive for back pain. Negative for joint pain and myalgias.   Gastrointestinal: Positive for constipation. Negative for abdominal pain, change in bowel habit, diarrhea and nausea.   Genitourinary: Negative for dysuria, frequency and hematuria.   Neurological: Positive for numbness. Negative for headaches.            Objective:     Vitals:    02/24/22 1102   BP: 113/72   BP Location: Left arm   Patient Position: Sitting   BP Method: Medium (Automatic)   Pulse: 89   Resp: 16   Temp: 96.8 °F (36 °C)   TempSrc: Temporal   SpO2: (!) 93%   Weight: 66.3 kg (146 lb 2.6 oz)   Height: 5' 8" (1.727 m)        Physical Exam  Constitutional:       General: She is not in acute distress.  HENT:      Head: Normocephalic and atraumatic.   Eyes:      General: No scleral icterus.  Abdominal:      General: There is no distension.   Musculoskeletal:      Right lower leg: No edema.      Left lower leg: No edema.   Skin:     General: Skin is warm and dry.   Neurological:      Mental Status: She is alert and oriented to person, place, and time.      Gait: Gait normal.   Psychiatric:         Mood and Affect: Mood " normal.         Behavior: Behavior normal.             Current Outpatient Medications on File Prior to Visit   Medication Sig    BREO ELLIPTA 100-25 mcg/dose diskus inhaler Inhale 2 puffs into the lungs once daily.    buprenorphine HCL (SUBUTEX) 8 mg Subl Place 8 mg under the tongue once daily.    empagliflozin (JARDIANCE) 25 mg tablet Take 1 tablet by mouth once daily.    FLUoxetine 10 MG capsule Take 2 capsules (20 mg total) by mouth every evening.    FLUoxetine 20 MG capsule Take 2 capsules (40 mg total) by mouth every morning.    fluticasone furoate-vilanteroL (BREO ELLIPTA) 100-25 mcg/dose diskus inhaler Inhale 1 puff into the lungs.    fluticasone furoate-vilanteroL (BREO ELLIPTA) 100-25 mcg/dose diskus inhaler Inhale 1 puff into the lungs.    gabapentin (NEURONTIN) 800 MG tablet Take 800 mg by mouth 3 (three) times daily.    JARDIANCE 25 mg tablet Take 1 Tablet (25 mg) by mouth daily in the morning.    LIDOcaine (LIDODERM) 5 % apply ONE PATCH ONCE DAILY FOR 12 hours THEN REMOVE    lisinopriL (PRINIVIL,ZESTRIL) 2.5 MG tablet Take 2.5 mg by mouth once daily.    lovastatin (MEVACOR) 10 MG tablet Take 10 mg by mouth nightly.    metformin (GLUCOPHAGE) 1000 MG tablet Take 1,000 mg by mouth 2 (two) times daily with meals.    traZODone (DESYREL) 50 MG tablet Take 50 mg by mouth nightly.    albuterol (PROVENTIL/VENTOLIN HFA) 90 mcg/actuation inhaler Inhale 2 puffs into the lungs every 6 (six) hours as needed for Wheezing or Shortness of Breath. Rescue (Patient not taking: No sig reported)    diclofenac (VOLTAREN) 75 MG EC tablet Take 75 mg by mouth 2 (two) times daily.    LIDOcaine (LIDODERM) 5 % Apply 1 patch topically.    traZODone (DESYREL) 50 MG tablet Take 50 mg by mouth.     No current facility-administered medications on file prior to visit.       CBC:  Lab Results   Component Value Date    WBC 14.22 (H) 02/14/2022    HGB 13.0 02/14/2022    HCT 42.0 02/14/2022    MCV 95 02/14/2022      02/14/2022         CMP:  Sodium   Date Value Ref Range Status   12/13/2021 139 136 - 145 mmol/L Final     Potassium   Date Value Ref Range Status   12/13/2021 4.6 3.5 - 5.1 mmol/L Final     Chloride   Date Value Ref Range Status   12/13/2021 104 95 - 110 mmol/L Final     CO2   Date Value Ref Range Status   12/13/2021 29 22 - 31 mmol/L Final     Glucose   Date Value Ref Range Status   12/13/2021 142 (H) 70 - 110 mg/dL Final     Comment:     The ADA recommends the following guidelines for fasting glucose:    Normal:       less than 100 mg/dL    Prediabetes:  100 mg/dL to 125 mg/dL    Diabetes:     126 mg/dL or higher       BUN   Date Value Ref Range Status   02/14/2022 18 7 - 18 mg/dL Final     Creatinine   Date Value Ref Range Status   02/14/2022 1.12 0.50 - 1.40 mg/dL Final     Calcium   Date Value Ref Range Status   12/13/2021 9.5 8.4 - 10.2 mg/dL Final     Total Protein   Date Value Ref Range Status   07/27/2016 6.7 6.0 - 8.4 g/dL Final     Albumin   Date Value Ref Range Status   07/27/2016 3.6 3.5 - 5.2 g/dL Final     Total Bilirubin   Date Value Ref Range Status   07/27/2016 0.4 0.1 - 1.0 mg/dL Final     Comment:     For infants and newborns, interpretation of results should be based  on gestational age, weight and in agreement with clinical  observations.  Premature Infant recommended reference ranges:  Up to 24 hours.............<8.0 mg/dL  Up to 48 hours............<12.0 mg/dL  3-5 days..................<15.0 mg/dL  6-29 days.................<15.0 mg/dL       Alkaline Phosphatase   Date Value Ref Range Status   07/27/2016 74 38 - 145 U/L Final     AST   Date Value Ref Range Status   07/27/2016 23 14 - 36 U/L Final     ALT   Date Value Ref Range Status   07/27/2016 24 10 - 44 U/L Final     Anion Gap   Date Value Ref Range Status   12/13/2021 6 (L) 8 - 16 mmol/L Final     eGFR if    Date Value Ref Range Status   02/14/2022 57 (A) >60 mL/min/1.73 m^2 Final     eGFR if non    Date  Value Ref Range Status   02/14/2022 50 (A) >60 mL/min/1.73 m^2 Final     Comment:     Calculation used to obtain the estimated glomerular filtration  rate (eGFR) is the CKD-EPI equation.          CT Chest Abdomen Pelvis With Contrast  Narrative: EXAMINATION:  CT CHEST ABDOMEN PELVIS WITH CONTRAST (XPD)    CLINICAL HISTORY:  new diagnosis of lung cancer; Malignant neoplasm of upper lobe, left bronchus or lung    TECHNIQUE:  Low dose axial images, sagittal and coronal reformations were obtained from the thoracic inlet to the pubic symphysis following the IV administration of 75 mL of Omnipaque 350 and the oral administration of 1000 ml of Omnipaque 9.    COMPARISON:  PET-CT scan-11/11/2021 performed at [facility], CT of the abdomen and pelvis without contrast-07/26/2016, and CT lung biopsy-02/14/2022    FINDINGS:  Chest:    The soft tissue structures at the base of the neck are unremarkable.  There is atherosclerotic calcification present within the thoracic aortic arch and at the origin of the left subclavian artery and right subclavian artery.  No thoracic aortic aneurysm.  There are aortic valve calcifications and aortic annulus calcifications.  No pericardial fluid.  The most significant abnormality once again relates to an approximately 56 x 39 x 50 mm spiculated pleural based soft tissue mass in the anteromedial aspect of the left upper lobe which extends into the pre-vascular space/anterior mediastinum abutting the anterior margin of the aortic arch on series 2, image 38.  There is encasement of the brachiocephalic vein resulting in narrowing and/or invasion of the brachiocephalic vein on series 2 images 33-36.  The mass is intimately associated with the left internal mammary artery on series 2, image 40 and left 2nd sternocostal cartilage.  There is intimate association with the left 1st  sternocostal joint.  No definite osseous erosive changes.  There is a 12 mm short axis right paratracheal lymph node  visible on series 2, image 44-45 which showed no associated FDG hypermetabolism at the time of the previous PET scan.  No enlarged hilar or axillary lymph nodes.  The main pulmonary artery and left and right pulmonary artery are patent without saddle pulmonary embolism appreciated.  No thoracic aortic aneurysm.    There is emphysematous lung architecture present.  There are a few scattered 3 mm or less solid pulmonary nodules present in the chest.  In particular, there is a 3 mm solid subpleural nodule in the right lung apex laterally on series 6, image 109.  There is a 3 mm solid nodule anteriorly in the right upper lobe on series 6, image 239.  There is a 3 mm solid nodule in the left lung apex laterally on series 6, image 129.  There is prominent atelectasis and/or fibrotic scarring at the left lung base with associated elevation of the left hemidiaphragm.  No bronchiectasis.  No significant volume of pleural fluid or pneumothorax.    Abdomen and pelvis:    There is a subtle 21 x 22 mm hypodense area present within the medial segment of the left hepatic lobe abutting the falciform ligament on series 3, image 59.  While it is possible that this represents focal fatty infiltration, an underlying solitary liver lesion cannot be excluded, and further investigation is warranted given the patient's pulmonary mass.  Consider additional evaluation with contrast enhanced MRI of the liver.  No definite focus of FDG hypermetabolism was noted in this vicinity at the time of the PET-CT scan.  No enhancing hepatic mass.  The portal vein is patent.  There is sludge present within the dependent gallbladder fundus.  No definite calcified gallstones.  No biliary dilatation.  The spleen, stomach, duodenal C-loop, pancreas, and adrenal glands are unremarkable.  There are multiple hypodensities present in both kidneys, most likely cysts.  No enhancing renal mass, hydronephrosis, or stones in either kidney.  The ureters are normal in  caliber and course without  definite stones appreciated.  The urinary bladder is unremarkable.    There is an unchanged 26 x 39 mm left adnexal hypodensity which has remained stable dating back to 2016 and which shows no associated FDG hypermetabolism at the time of the patient's recent PET-CT scan.  The uterus is surgically absent.  The right ovary is not definitively identified.    The appendix is normal in appearance.  The visualized loops of bowel show no evidence of inflammation or obstruction.  No peritoneal soft tissue mass, ascites, mesenteric lymphadenopathy, or intra-abdominal abscess.  There is a tiny fat containing umbilical hernia.  No inguinal hernia or inguinal lymphadenopathy.    Bones: There is degenerative osteoarthritis of the thoracolumbar spine in the form of marginal osteophyte formation and disc space narrowing.  No definite osseous metastatic disease appreciated on the provided images.  Impression: 1. 56 x 39 x 15 mm spiculated pleural-based left upper lobe mass (compatible with biopsy proven diagnosis of squamous cell carcinoma) which abuts and invades the anterior mediastinum with compression and/or invasion of the brachial cephalic vein and intimate association with the left internal mammary vessels and anterior margin of the thoracic aortic arch.  2. Scattered solid pulmonary nodules measuring up to 3 mm in size for which short-term imaging follow-up is recommended.  3. Centrilobular emphysematous lung architecture.  4. 21 x 22 mm hypodense area within the medial segment of the left hepatic lobe abutting the interlobar fissure.  While it is possible that this represents focal fatty infiltration, an underlying solitary liver lesion or focus of metastatic disease cannot be excluded.  The patient's recent PET-CT scan showed no abnormal FDG hypermetabolism in this vicinity.  Consider additional investigation with contrast enhanced MRI of the liver.  5. Stable 26 x 39 mm left adnexal  hypodensity which show no FDG hypermetabolism at the time of the patient's recent PET-CT scan.  6. Tiny fat containing umbilical hernia.  7. 12 mm short axis right paratracheal lymph node which showed no FDG hypermetabolism at the time of the patient's recent PET-CT scan and which remains unchanged in size since that study.    Electronically signed by: Boy Cartagena MD  Date:    02/23/2022  Time:    16:39       All pertinent labs and imaging reviewed.    Assessment:       1. Malignant neoplasm of upper lobe of left lung         Plan:   Malignant neoplasm of left upper lobe  -Treatment plan of weekly Carboplatin/Taxol x 6 cycles, followed by Imfinzi discussed with patient and family.  - Handouts provided from [employer] on chemotherapy agents.    -Discussed the mechanism of action, potential side effects of this treatment as well as ways to manage them at home. Some of these side effects include but not limited to fever, nausea, vomiting, decreased appetite, fatigue, weakness, cytopenias, myalgia/arthralgia, constipation, diarrhea, bleeding, headache, nail changes, taste change, hair thinning/loss, peripheral neuropathy, kidney toxicity, or ototoxicity.   -Dietary modifications discussed.  Detail instructions to be provided by dietician.   -Chemotherapy Binder supplied with contact information.   -Discussed follow-up with the physician for toxicity monitoring throughout treatment.    -Port placement is pending per Dr. Solitario  -Script for Emla cream to be used prior to port access was sent to pharmacy.   -Script for Boyer's MW was sent to pharmacy to be used PRN for mucositis  -Patient states Ondansetron does not work well for her. Script for Phenergan 12.5 mg tabs to be taken every 6 hours PRN was sent to her pharmacy. Explained this may make her sleepy.   -The patient and family verbalized understanding. All questions were answered and an informed consent was obtained.    Tentative plan to begin chemo/RT on  03/08/22  Will need appointment with Dr. Styles prior to initiation of therapy

## 2022-02-24 NOTE — TELEPHONE ENCOUNTER
----- Message from Ziggy Sanders Jr., MD sent at 2/24/2022  8:07 AM CST -----  OK. It also sounds like her port placement was pushed back until 3/9. We can push back the CT sim, to allow her to get he MRI liver. Hoping its just a little artifact but agree it does not look like a cyst.   ----- Message -----  From: Mere Styles MD  Sent: 2/23/2022   6:11 PM CST  To: Leonarda Marsh RN, MD Leonarda Dumont Jr., Please let her know that I need a MRI of abdomen to get a closer look at her liver. There is something subtle, could be fat, definitely was not on PET

## 2022-02-28 ENCOUNTER — TELEPHONE (OUTPATIENT)
Dept: INFUSION THERAPY | Facility: HOSPITAL | Age: 72
End: 2022-02-28
Payer: MEDICARE

## 2022-02-28 NOTE — TELEPHONE ENCOUNTER
----- Message from Alycia Gamino MA sent at 2/24/2022 12:01 PM CST -----  Regarding: RE: Concurrent chemo and XRT to start 3/8  We can do chemo on 03/10/22 @9:30    ----- Message -----  From: Dianne Read RN  Sent: 2/24/2022   9:04 AM CST  To: Mere Styles MD, Ziggy Sanders Jr., MD, #  Subject: FW: Concurrent chemo and XRT to start 3/8        New plan!  MRI and simulation on 3/3, port on 3/9, tentative start date for chemo and radiation 3/10 (or 3/14 if you would rather start on a Monday).  Of course this could change depending on results of MRI.  ----- Message -----  From: Dianne Read RN  Sent: 2/22/2022  11:39 AM CST  To: Mere Styles MD, Ziggy Sanders Jr., MD, #  Subject: Concurrent chemo and XRT to start 3/8            Tentative start date 3/8 (date of port placement still unknown).  Appt w/Dr. Styles at 0920, will schedule XRT 10:15 or 10:30 and hopefully chemo to follow. Thanks!

## 2022-03-02 ENCOUNTER — TELEPHONE (OUTPATIENT)
Dept: HEMATOLOGY/ONCOLOGY | Facility: CLINIC | Age: 72
End: 2022-03-02
Payer: MEDICARE

## 2022-03-02 NOTE — TELEPHONE ENCOUNTER
Spoke with daughter. She will fax over fmla forms to nurse.  Nurse provided fax number.  She thanked nurse.

## 2022-03-02 NOTE — TELEPHONE ENCOUNTER
----- Message from Sruthi Wilson sent at 3/2/2022 11:36 AM CST -----  Type:Needs Medical Advice    Who Called:Chela (Daughter)  Best call back number:[phone] Work Phone / Cell: [phone]  Additional Info: Requesting a call back regarding#Pts daughter has FMLA paperwork that Dr needs to fill out. How do you want her to get it to you? Please call and let her know.  Please Advise- Thank you

## 2022-03-03 ENCOUNTER — TELEPHONE (OUTPATIENT)
Dept: HEMATOLOGY/ONCOLOGY | Facility: CLINIC | Age: 72
End: 2022-03-03
Payer: MEDICARE

## 2022-03-03 ENCOUNTER — HOSPITAL ENCOUNTER (OUTPATIENT)
Dept: RADIATION THERAPY | Facility: HOSPITAL | Age: 72
Discharge: HOME | End: 2022-03-03
Attending: STUDENT IN AN ORGANIZED HEALTH CARE EDUCATION/TRAINING PROGRAM
Payer: MEDICARE

## 2022-03-03 PROCEDURE — 77334 PR  RADN TREATMENT AID(S) COMPLX: ICD-10-PCS | Mod: 26,,, | Performed by: STUDENT IN AN ORGANIZED HEALTH CARE EDUCATION/TRAINING PROGRAM

## 2022-03-03 PROCEDURE — 77014 HC CT GUIDANCE RADIATION THERAPY FLDS PLACEMENT: CPT | Mod: TC,PN | Performed by: STUDENT IN AN ORGANIZED HEALTH CARE EDUCATION/TRAINING PROGRAM

## 2022-03-03 PROCEDURE — 77263 PR  RADIATION THERAPY PLAN COMPLEX: ICD-10-PCS | Mod: ,,, | Performed by: STUDENT IN AN ORGANIZED HEALTH CARE EDUCATION/TRAINING PROGRAM

## 2022-03-03 PROCEDURE — 77263 THER RADIOLOGY TX PLNG CPLX: CPT | Mod: ,,, | Performed by: STUDENT IN AN ORGANIZED HEALTH CARE EDUCATION/TRAINING PROGRAM

## 2022-03-03 PROCEDURE — 77334 RADIATION TREATMENT AID(S): CPT | Mod: TC,PN | Performed by: STUDENT IN AN ORGANIZED HEALTH CARE EDUCATION/TRAINING PROGRAM

## 2022-03-03 PROCEDURE — 77014 PR  CT GUIDANCE PLACEMENT RAD THERAPY FIELDS: CPT | Mod: 26,,, | Performed by: STUDENT IN AN ORGANIZED HEALTH CARE EDUCATION/TRAINING PROGRAM

## 2022-03-03 PROCEDURE — 77334 RADIATION TREATMENT AID(S): CPT | Mod: 26,,, | Performed by: STUDENT IN AN ORGANIZED HEALTH CARE EDUCATION/TRAINING PROGRAM

## 2022-03-03 PROCEDURE — 77014 PR  CT GUIDANCE PLACEMENT RAD THERAPY FIELDS: ICD-10-PCS | Mod: 26,,, | Performed by: STUDENT IN AN ORGANIZED HEALTH CARE EDUCATION/TRAINING PROGRAM

## 2022-03-03 NOTE — TELEPHONE ENCOUNTER
----- Message from Anali Rodrigues sent at 3/3/2022  1:27 PM CST -----  Contact: Patient  Patient phone in regarding verifying appointments on 03/10/2022    Patient stated was told today patient starting radiation on 03/10/2022    Please advice    Patient can be reach at [phone]

## 2022-03-06 ENCOUNTER — LAB VISIT (OUTPATIENT)
Dept: FAMILY MEDICINE | Facility: CLINIC | Age: 72
End: 2022-03-06
Payer: MEDICARE

## 2022-03-06 DIAGNOSIS — Z01.818 PRE-OP TESTING: ICD-10-CM

## 2022-03-06 PROCEDURE — U0003 INFECTIOUS AGENT DETECTION BY NUCLEIC ACID (DNA OR RNA); SEVERE ACUTE RESPIRATORY SYNDROME CORONAVIRUS 2 (SARS-COV-2) (CORONAVIRUS DISEASE [COVID-19]), AMPLIFIED PROBE TECHNIQUE, MAKING USE OF HIGH THROUGHPUT TECHNOLOGIES AS DESCRIBED BY CMS-2020-01-R: HCPCS | Performed by: SURGERY

## 2022-03-06 PROCEDURE — U0005 INFEC AGEN DETEC AMPLI PROBE: HCPCS | Performed by: SURGERY

## 2022-03-07 LAB
SARS-COV-2 RNA RESP QL NAA+PROBE: NOT DETECTED
SARS-COV-2- CYCLE NUMBER: NORMAL

## 2022-03-07 PROCEDURE — 77301 PR  INTEN MOD RADIOTHER PLAN W/DOSE VOL HIST: ICD-10-PCS | Mod: 26,,, | Performed by: STUDENT IN AN ORGANIZED HEALTH CARE EDUCATION/TRAINING PROGRAM

## 2022-03-07 PROCEDURE — 77301 RADIOTHERAPY DOSE PLAN IMRT: CPT | Mod: 26,,, | Performed by: STUDENT IN AN ORGANIZED HEALTH CARE EDUCATION/TRAINING PROGRAM

## 2022-03-07 PROCEDURE — 77301 RADIOTHERAPY DOSE PLAN IMRT: CPT | Mod: TC,PN | Performed by: STUDENT IN AN ORGANIZED HEALTH CARE EDUCATION/TRAINING PROGRAM

## 2022-03-07 PROCEDURE — 77293 PR RESPIRATORY MOTION MGMT SIMULATION: ICD-10-PCS | Mod: 26,,, | Performed by: STUDENT IN AN ORGANIZED HEALTH CARE EDUCATION/TRAINING PROGRAM

## 2022-03-07 PROCEDURE — 77293 RESPIRATOR MOTION MGMT SIMUL: CPT | Mod: 26,,, | Performed by: STUDENT IN AN ORGANIZED HEALTH CARE EDUCATION/TRAINING PROGRAM

## 2022-03-07 PROCEDURE — 77293 RESPIRATOR MOTION MGMT SIMUL: CPT | Mod: TC,PN | Performed by: STUDENT IN AN ORGANIZED HEALTH CARE EDUCATION/TRAINING PROGRAM

## 2022-03-08 ENCOUNTER — ANESTHESIA EVENT (OUTPATIENT)
Dept: SURGERY | Facility: HOSPITAL | Age: 72
End: 2022-03-08
Payer: MEDICARE

## 2022-03-08 PROCEDURE — 77300 RADIATION THERAPY DOSE PLAN: CPT | Mod: 26,,, | Performed by: STUDENT IN AN ORGANIZED HEALTH CARE EDUCATION/TRAINING PROGRAM

## 2022-03-08 PROCEDURE — 77300 PR RADIATION THERAPY,DOSIMETRY PLAN: ICD-10-PCS | Mod: 26,,, | Performed by: STUDENT IN AN ORGANIZED HEALTH CARE EDUCATION/TRAINING PROGRAM

## 2022-03-08 PROCEDURE — 77300 RADIATION THERAPY DOSE PLAN: CPT | Mod: TC,PN | Performed by: STUDENT IN AN ORGANIZED HEALTH CARE EDUCATION/TRAINING PROGRAM

## 2022-03-08 PROCEDURE — 77338 DESIGN MLC DEVICE FOR IMRT: CPT | Mod: 26,,, | Performed by: STUDENT IN AN ORGANIZED HEALTH CARE EDUCATION/TRAINING PROGRAM

## 2022-03-08 PROCEDURE — 77338 PR  MLC IMRT DESIGN & CONSTRUCTION PER IMRT PLAN: ICD-10-PCS | Mod: 26,,, | Performed by: STUDENT IN AN ORGANIZED HEALTH CARE EDUCATION/TRAINING PROGRAM

## 2022-03-08 PROCEDURE — 77338 DESIGN MLC DEVICE FOR IMRT: CPT | Mod: TC,PN | Performed by: STUDENT IN AN ORGANIZED HEALTH CARE EDUCATION/TRAINING PROGRAM

## 2022-03-08 RX ORDER — OXYCODONE HYDROCHLORIDE 5 MG/1
5 TABLET ORAL ONCE AS NEEDED
Status: CANCELLED | OUTPATIENT
Start: 2022-03-08 | End: 2033-08-04

## 2022-03-08 RX ORDER — FENTANYL CITRATE 50 UG/ML
25 INJECTION, SOLUTION INTRAMUSCULAR; INTRAVENOUS EVERY 5 MIN PRN
Status: CANCELLED | OUTPATIENT
Start: 2022-03-08

## 2022-03-08 RX ORDER — METOCLOPRAMIDE HYDROCHLORIDE 5 MG/ML
10 INJECTION INTRAMUSCULAR; INTRAVENOUS EVERY 10 MIN PRN
Status: CANCELLED | OUTPATIENT
Start: 2022-03-08

## 2022-03-08 RX ORDER — SODIUM CHLORIDE 0.9 % (FLUSH) 0.9 %
3 SYRINGE (ML) INJECTION
Status: CANCELLED | OUTPATIENT
Start: 2022-03-08

## 2022-03-09 ENCOUNTER — ANESTHESIA (OUTPATIENT)
Dept: SURGERY | Facility: HOSPITAL | Age: 72
End: 2022-03-09
Payer: MEDICARE

## 2022-03-09 ENCOUNTER — HOSPITAL ENCOUNTER (OUTPATIENT)
Facility: HOSPITAL | Age: 72
Discharge: HOME | End: 2022-03-09
Attending: SURGERY | Admitting: SURGERY
Payer: MEDICARE

## 2022-03-09 ENCOUNTER — DOCUMENTATION ONLY (OUTPATIENT)
Dept: PHARMACY | Facility: AMBULARY SURGERY CENTER | Age: 72
End: 2022-03-09
Payer: MEDICARE

## 2022-03-09 ENCOUNTER — HOSPITAL ENCOUNTER (OUTPATIENT)
Dept: RADIOLOGY | Facility: HOSPITAL | Age: 72
Discharge: HOME | End: 2022-03-09
Attending: SURGERY
Payer: MEDICARE

## 2022-03-09 DIAGNOSIS — C34.90 MALIGNANT NEOPLASM OF LUNG, UNSPECIFIED LATERALITY, UNSPECIFIED PART OF LUNG: ICD-10-CM

## 2022-03-09 DIAGNOSIS — Z95.828 PORT-A-CATH IN PLACE: ICD-10-CM

## 2022-03-09 LAB — GLUCOSE SERPL-MCNC: 121 MG/DL (ref 70–110)

## 2022-03-09 PROCEDURE — 37000008 HC ANESTHESIA 1ST 15 MINUTES: Mod: PO | Performed by: SURGERY

## 2022-03-09 PROCEDURE — C1788 PORT, INDWELLING, IMP: HCPCS | Mod: PO | Performed by: SURGERY

## 2022-03-09 PROCEDURE — 63600175 PHARM REV CODE 636 W HCPCS: Mod: PO | Performed by: ANESTHESIOLOGY

## 2022-03-09 PROCEDURE — 36561 INSERT TUNNELED CV CATH: CPT | Mod: RT,,, | Performed by: SURGERY

## 2022-03-09 PROCEDURE — 36000706: Mod: PO | Performed by: SURGERY

## 2022-03-09 PROCEDURE — 63600175 PHARM REV CODE 636 W HCPCS: Mod: PO | Performed by: SURGERY

## 2022-03-09 PROCEDURE — 37000009 HC ANESTHESIA EA ADD 15 MINS: Mod: PO | Performed by: SURGERY

## 2022-03-09 PROCEDURE — 63600175 PHARM REV CODE 636 W HCPCS: Mod: PO | Performed by: NURSE ANESTHETIST, CERTIFIED REGISTERED

## 2022-03-09 PROCEDURE — 82962 GLUCOSE BLOOD TEST: CPT | Mod: PO | Performed by: SURGERY

## 2022-03-09 PROCEDURE — 25000003 PHARM REV CODE 250: Mod: PO | Performed by: NURSE ANESTHETIST, CERTIFIED REGISTERED

## 2022-03-09 PROCEDURE — 71000033 HC RECOVERY, INTIAL HOUR: Mod: PO | Performed by: SURGERY

## 2022-03-09 PROCEDURE — D9220A PRA ANESTHESIA: ICD-10-PCS | Mod: ANES,,, | Performed by: ANESTHESIOLOGY

## 2022-03-09 PROCEDURE — D9220A PRA ANESTHESIA: Mod: CRNA,,, | Performed by: NURSE ANESTHETIST, CERTIFIED REGISTERED

## 2022-03-09 PROCEDURE — 77470 PR  SPECIAL RADIATION TREATMENT: ICD-10-PCS | Mod: 26,59,, | Performed by: STUDENT IN AN ORGANIZED HEALTH CARE EDUCATION/TRAINING PROGRAM

## 2022-03-09 PROCEDURE — 77001 FLUOROGUIDE FOR VEIN DEVICE: CPT | Mod: 26,,, | Performed by: SURGERY

## 2022-03-09 PROCEDURE — 71045 X-RAY EXAM CHEST 1 VIEW: CPT | Mod: 26,,, | Performed by: RADIOLOGY

## 2022-03-09 PROCEDURE — 77470 SPECIAL RADIATION TREATMENT: CPT | Mod: 26,59,, | Performed by: STUDENT IN AN ORGANIZED HEALTH CARE EDUCATION/TRAINING PROGRAM

## 2022-03-09 PROCEDURE — D9220A PRA ANESTHESIA: Mod: ANES,,, | Performed by: ANESTHESIOLOGY

## 2022-03-09 PROCEDURE — 71045 XR CHEST 1 VIEW: ICD-10-PCS | Mod: 26,,, | Performed by: RADIOLOGY

## 2022-03-09 PROCEDURE — 36561 PR INSERT TUNNELED CV CATH WITH PORT: ICD-10-PCS | Mod: RT,,, | Performed by: SURGERY

## 2022-03-09 PROCEDURE — 77001 CHG FLUOROGUIDE CNTRL VEN ACCESS,PLACE,REPLACE,REMOVE: ICD-10-PCS | Mod: 26,,, | Performed by: SURGERY

## 2022-03-09 PROCEDURE — 36000707: Mod: PO | Performed by: SURGERY

## 2022-03-09 PROCEDURE — 25000003 PHARM REV CODE 250: Mod: PO | Performed by: SURGERY

## 2022-03-09 PROCEDURE — D9220A PRA ANESTHESIA: ICD-10-PCS | Mod: CRNA,,, | Performed by: NURSE ANESTHETIST, CERTIFIED REGISTERED

## 2022-03-09 PROCEDURE — 77001 FLUOROGUIDE FOR VEIN DEVICE: CPT | Mod: TC,PO

## 2022-03-09 PROCEDURE — 71045 X-RAY EXAM CHEST 1 VIEW: CPT | Mod: TC,FY,PO

## 2022-03-09 PROCEDURE — 77470 SPECIAL RADIATION TREATMENT: CPT | Mod: 59,TC,PN | Performed by: STUDENT IN AN ORGANIZED HEALTH CARE EDUCATION/TRAINING PROGRAM

## 2022-03-09 DEVICE — KIT POWERPORT SINGLE 8FR: Type: IMPLANTABLE DEVICE | Site: SUBCLAVIAN | Status: FUNCTIONAL

## 2022-03-09 RX ORDER — PROPOFOL 10 MG/ML
VIAL (ML) INTRAVENOUS CONTINUOUS PRN
Status: DISCONTINUED | OUTPATIENT
Start: 2022-03-09 | End: 2022-03-09

## 2022-03-09 RX ORDER — SODIUM CHLORIDE 0.9 G/100ML
IRRIGANT IRRIGATION
Status: DISCONTINUED | OUTPATIENT
Start: 2022-03-09 | End: 2022-03-09 | Stop reason: HOSPADM

## 2022-03-09 RX ORDER — SODIUM CHLORIDE 9 MG/ML
INJECTION, SOLUTION INTRAVENOUS CONTINUOUS
Status: DISCONTINUED | OUTPATIENT
Start: 2022-03-09 | End: 2022-03-09 | Stop reason: HOSPADM

## 2022-03-09 RX ORDER — LIDOCAINE HYDROCHLORIDE 20 MG/ML
INJECTION INTRAVENOUS
Status: DISCONTINUED | OUTPATIENT
Start: 2022-03-09 | End: 2022-03-09

## 2022-03-09 RX ORDER — ONDANSETRON 2 MG/ML
4 INJECTION INTRAMUSCULAR; INTRAVENOUS EVERY 12 HOURS PRN
Status: DISCONTINUED | OUTPATIENT
Start: 2022-03-09 | End: 2022-03-09 | Stop reason: HOSPADM

## 2022-03-09 RX ORDER — FENTANYL CITRATE 50 UG/ML
INJECTION, SOLUTION INTRAMUSCULAR; INTRAVENOUS
Status: DISCONTINUED | OUTPATIENT
Start: 2022-03-09 | End: 2022-03-09

## 2022-03-09 RX ORDER — LIDOCAINE HYDROCHLORIDE 10 MG/ML
1 INJECTION, SOLUTION EPIDURAL; INFILTRATION; INTRACAUDAL; PERINEURAL ONCE
Status: DISCONTINUED | OUTPATIENT
Start: 2022-03-09 | End: 2022-03-09 | Stop reason: HOSPADM

## 2022-03-09 RX ORDER — PROPOFOL 10 MG/ML
VIAL (ML) INTRAVENOUS
Status: DISCONTINUED | OUTPATIENT
Start: 2022-03-09 | End: 2022-03-09

## 2022-03-09 RX ORDER — SODIUM CHLORIDE, SODIUM LACTATE, POTASSIUM CHLORIDE, CALCIUM CHLORIDE 600; 310; 30; 20 MG/100ML; MG/100ML; MG/100ML; MG/100ML
INJECTION, SOLUTION INTRAVENOUS CONTINUOUS
Status: DISCONTINUED | OUTPATIENT
Start: 2022-03-09 | End: 2022-03-09 | Stop reason: HOSPADM

## 2022-03-09 RX ORDER — HYDROCODONE BITARTRATE AND ACETAMINOPHEN 5; 325 MG/1; MG/1
1 TABLET ORAL EVERY 4 HOURS PRN
Status: DISCONTINUED | OUTPATIENT
Start: 2022-03-09 | End: 2022-03-09 | Stop reason: HOSPADM

## 2022-03-09 RX ORDER — BUPIVACAINE HCL/EPINEPHRINE 0.25-.0005
VIAL (ML) INJECTION
Status: DISCONTINUED | OUTPATIENT
Start: 2022-03-09 | End: 2022-03-09 | Stop reason: HOSPADM

## 2022-03-09 RX ORDER — CEFAZOLIN SODIUM 2 G/50ML
2 SOLUTION INTRAVENOUS
Status: DISCONTINUED | OUTPATIENT
Start: 2022-03-09 | End: 2022-03-09 | Stop reason: HOSPADM

## 2022-03-09 RX ORDER — MIDAZOLAM HYDROCHLORIDE 1 MG/ML
INJECTION, SOLUTION INTRAMUSCULAR; INTRAVENOUS
Status: DISCONTINUED | OUTPATIENT
Start: 2022-03-09 | End: 2022-03-09

## 2022-03-09 RX ORDER — HYDROCODONE BITARTRATE AND ACETAMINOPHEN 5; 325 MG/1; MG/1
1 TABLET ORAL EVERY 6 HOURS PRN
Qty: 20 TABLET | Refills: 0 | Status: SHIPPED | OUTPATIENT
Start: 2022-03-09 | End: 2022-04-20

## 2022-03-09 RX ORDER — HEPARIN 100 UNIT/ML
SYRINGE INTRAVENOUS
Status: DISCONTINUED | OUTPATIENT
Start: 2022-03-09 | End: 2022-03-09 | Stop reason: HOSPADM

## 2022-03-09 RX ADMIN — PROPOFOL 90 MG: 10 INJECTION, EMULSION INTRAVENOUS at 12:03

## 2022-03-09 RX ADMIN — MIDAZOLAM HYDROCHLORIDE 2 MG: 1 INJECTION, SOLUTION INTRAMUSCULAR; INTRAVENOUS at 12:03

## 2022-03-09 RX ADMIN — SODIUM CHLORIDE, SODIUM LACTATE, POTASSIUM CHLORIDE, AND CALCIUM CHLORIDE: .6; .31; .03; .02 INJECTION, SOLUTION INTRAVENOUS at 11:03

## 2022-03-09 RX ADMIN — CEFAZOLIN SODIUM 2 G: 1 INJECTION, POWDER, FOR SOLUTION INTRAMUSCULAR; INTRAVENOUS at 12:03

## 2022-03-09 RX ADMIN — PROPOFOL 75 MCG/KG/MIN: 10 INJECTION, EMULSION INTRAVENOUS at 11:03

## 2022-03-09 RX ADMIN — PROPOFOL 60 MG: 10 INJECTION, EMULSION INTRAVENOUS at 12:03

## 2022-03-09 RX ADMIN — FENTANYL CITRATE 25 MCG: 50 INJECTION, SOLUTION INTRAMUSCULAR; INTRAVENOUS at 12:03

## 2022-03-09 RX ADMIN — PROPOFOL 50 MCG/KG/MIN: 10 INJECTION, EMULSION INTRAVENOUS at 12:03

## 2022-03-09 RX ADMIN — LIDOCAINE HYDROCHLORIDE 75 MG: 20 INJECTION, SOLUTION INTRAVENOUS at 12:03

## 2022-03-09 RX ADMIN — FENTANYL CITRATE 50 MCG: 50 INJECTION, SOLUTION INTRAMUSCULAR; INTRAVENOUS at 12:03

## 2022-03-09 NOTE — TRANSFER OF CARE
"Anesthesia Transfer of Care Note    Patient: Teresa Earl    Procedure(s) Performed: Procedure(s) (LRB):  [id number] (N/A)    Patient location: PACU    Anesthesia Type: MAC    Transport from OR: Transported from OR on room air with adequate spontaneous ventilation    Post pain: adequate analgesia    Post assessment: no apparent anesthetic complications and tolerated procedure well    Post vital signs: stable    Level of consciousness: awake and alert    Nausea/Vomiting: no nausea/vomiting    Complications: none    Transfer of care protocol was followed      Last vitals:   Visit Vitals  BP (!) 175/73 (BP Location: Left arm, Patient Position: Lying)   Pulse 91   Temp 36.7 °C (98.1 °F) (Skin)   Resp 18   Ht 5' 8" (1.727 m)   Wt 66.2 kg (146 lb)   SpO2 95%   Breastfeeding No   BMI 22.20 kg/m²     "

## 2022-03-09 NOTE — ANESTHESIA PREPROCEDURE EVALUATION
03/09/2022  Teresa Earl is a 71 y.o., female.        Pre-operative evaluation for Procedure(s) (LRB):  [id number]-[id number] (N/A)    Encounter Diagnosis   Name Primary?    Malignant neoplasm of lung, unspecified laterality, unspecified part of lung        Review of patient's allergies indicates:   Allergen Reactions    Codeine Hives and Other (See Comments)     Other reaction(s): Unknown  Other reaction(s): Unknown      Ibuprofen Swelling    Morphine Diarrhea and Other (See Comments)     Other reaction(s): Renal Dysfunctions  Other reaction(s): Renal Dysfunctions         Medications Prior to Admission   Medication Sig Dispense Refill Last Dose    albuterol (PROVENTIL/VENTOLIN HFA) 90 mcg/actuation inhaler Inhale 2 puffs into the lungs every 6 (six) hours as needed for Wheezing or Shortness of Breath. Rescue 1 g 5 3/9/2022 at 0500    BREO ELLIPTA 100-25 mcg/dose diskus inhaler Inhale 2 puffs into the lungs once daily.   3/9/2022 at 0500    buprenorphine HCL (SUBUTEX) 8 mg Subl Place 8 mg under the tongue once daily.   3/9/2022 at 0500    FLUoxetine 20 MG capsule Take 2 capsules (40 mg total) by mouth every morning. 30 capsule 2 3/9/2022 at 0500    fluticasone furoate-vilanteroL (BREO ELLIPTA) 100-25 mcg/dose diskus inhaler Inhale 1 puff into the lungs.   3/7/2022 at Unknown time    fluticasone furoate-vilanteroL (BREO ELLIPTA) 100-25 mcg/dose diskus inhaler Inhale 1 puff into the lungs.   3/7/2022 at Unknown time    gabapentin (NEURONTIN) 800 MG tablet Take 800 mg by mouth 3 (three) times daily.   3/7/2022 at Unknown time    JARDIANCE 25 mg tablet Take 1 Tablet (25 mg) by mouth daily in the morning.   3/8/2022 at Unknown time    LIDOcaine (LIDODERM) 5 % apply ONE PATCH ONCE DAILY FOR 12 hours THEN REMOVE   3/7/2022 at Unknown time    LIDOcaine (LIDODERM) 5 % Apply 1 patch topically.   3/7/2022  at Unknown time    lovastatin (MEVACOR) 10 MG tablet Take 10 mg by mouth nightly.   3/7/2022 at Unknown time    metformin (GLUCOPHAGE) 1000 MG tablet Take 1,000 mg by mouth 2 (two) times daily with meals.   3/7/2022 at Unknown time    promethazine (PHENERGAN) 12.5 MG Tab Take 1 tablet (12.5 mg total) by mouth every 6 (six) hours as needed (N/V). 30 tablet 0 Past Month at Unknown time    traZODone (DESYREL) 50 MG tablet Take 50 mg by mouth.   3/6/2022 at Unknown time    [DISCONTINUED] FLUoxetine 10 MG capsule Take 2 capsules (20 mg total) by mouth every evening. 30 capsule 2 3/7/2022 at Unknown time    duke's soln (benadryl 30 mL, mylanta 30 mL, LIDOcaine 30 mL, nystatin 30 mL) 120mL Take 10 mLs by mouth 4 (four) times daily. 120 mL 1     LIDOcaine-prilocaine (EMLA) cream Apply topically as needed (Prior to port access). 30 g 0     [DISCONTINUED] diclofenac (VOLTAREN) 75 MG EC tablet Take 75 mg by mouth 2 (two) times daily.       [DISCONTINUED] lisinopriL (PRINIVIL,ZESTRIL) 2.5 MG tablet Take 2.5 mg by mouth once daily.           No current facility-administered medications on file prior to encounter.     Current Outpatient Medications on File Prior to Encounter   Medication Sig Dispense Refill    albuterol (PROVENTIL/VENTOLIN HFA) 90 mcg/actuation inhaler Inhale 2 puffs into the lungs every 6 (six) hours as needed for Wheezing or Shortness of Breath. Rescue 1 g 5    BREO ELLIPTA 100-25 mcg/dose diskus inhaler Inhale 2 puffs into the lungs once daily.      buprenorphine HCL (SUBUTEX) 8 mg Subl Place 8 mg under the tongue once daily.      FLUoxetine 20 MG capsule Take 2 capsules (40 mg total) by mouth every morning. 30 capsule 2    fluticasone furoate-vilanteroL (BREO ELLIPTA) 100-25 mcg/dose diskus inhaler Inhale 1 puff into the lungs.      fluticasone furoate-vilanteroL (BREO ELLIPTA) 100-25 mcg/dose diskus inhaler Inhale 1 puff into the lungs.      gabapentin (NEURONTIN) 800 MG tablet Take 800 mg by  mouth 3 (three) times daily.      JARDIANCE 25 mg tablet Take 1 Tablet (25 mg) by mouth daily in the morning.      LIDOcaine (LIDODERM) 5 % apply ONE PATCH ONCE DAILY FOR 12 hours THEN REMOVE      LIDOcaine (LIDODERM) 5 % Apply 1 patch topically.      lovastatin (MEVACOR) 10 MG tablet Take 10 mg by mouth nightly.      metformin (GLUCOPHAGE) 1000 MG tablet Take 1,000 mg by mouth 2 (two) times daily with meals.      promethazine (PHENERGAN) 12.5 MG Tab Take 1 tablet (12.5 mg total) by mouth every 6 (six) hours as needed (N/V). 30 tablet 0    traZODone (DESYREL) 50 MG tablet Take 50 mg by mouth.      duke's soln (benadryl 30 mL, mylanta 30 mL, LIDOcaine 30 mL, nystatin 30 mL) 120mL Take 10 mLs by mouth 4 (four) times daily. 120 mL 1    LIDOcaine-prilocaine (EMLA) cream Apply topically as needed (Prior to port access). 30 g 0       Past Medical History:  No date: Acute kidney injury  No date: Cervical radiculopathy  1/26/2022: Cigarette smoker  No date: Diabetes mellitus  No date: Hypertension  1/26/2022: Mass of upper lobe of left lung  No date: Neuropathy  No date: Rhabdomyolysis    Past Surgical History:   Procedure Laterality Date    BILATERAL TUBAL LIGATION      ENDOBRONCHIAL ULTRASOUND N/A 12/13/2021    Procedure: ENDOBRONCHIAL ULTRASOUN;  Surgeon: William Padgett Jr., DO;  Location: [facility];  Service: Pulmonary;  Laterality: N/A;    HYSTERECTOMY      NAVIGATIONAL BRONCHOSCOPY Left 12/13/2021    Procedure: BRONCHOSCOPY, NAVIGATIONAL;  Surgeon: William Padgett Jr., DO;  Location: [facility];  Service: Pulmonary;  Laterality: Left;       Social History     Tobacco Use   Smoking Status Current Every Day Smoker    Packs/day: 0.50    Types: Cigarettes   Smokeless Tobacco Never Used   Tobacco Comment    Age Started: 15       Social History     Substance and Sexual Activity   Alcohol Use No       Physical Activity: Not on file         No results for input(s): HCT in the last 72 hours.  No results for  input(s): PLT in the last 72 hours.  No results for input(s): K in the last 72 hours.  No results for input(s): CREATININE in the last 72 hours.  No results for input(s): GLU in the last 72 hours.  No results for input(s): PT in the last 72 hours.                    Pre-op Assessment          Review of Systems  Anesthesia Hx:  No problems with previous Anesthesia    Hematology/Oncology:  Hematology Normal      Current/Recent Cancer. Oncology Comments: GRACIELA lesion with mediastinal extension.     miinor skin cured with excision     Cardiovascular:   Hypertension, well controlled Denies MI.    Denies Angina.    Pulmonary:   COPD, moderate Asthma (routine breo ellipta, bid alupent) Shortness of breath No er visits for lungs.shops a few times a month for 2 hours without difficulty, gets SOB if she pushes herself     Renal/:   Denies Chronic Renal Disease.  7 rhabdomyolosis with kidney injury that has resolved   Hepatic/GI:   Denies Liver Disease.    Neurological:   Denies TIA. Denies CVA. Denies Seizures.    Endocrine:   Diabetes, type 2        Physical Exam    Airway:  Mallampati: II   Mouth Opening: Normal  TM Distance: Normal  Tongue: Normal  Neck ROM: Normal ROM    Dental:Full upper      Anesthesia Plan  Type of Anesthesia, risks & benefits discussed:    Anesthesia Type: Gen Natural Airway  Intra-op Monitoring Plan: Standard ASA Monitors  Post Op Pain Control Plan: IV/PO Opioids PRN  Induction:  IV  Informed Consent: Informed consent signed with the Patient and all parties understand the risks and agree with anesthesia plan.  All questions answered.   ASA Score: 2  Day of Surgery Review of History & Physical: I have interviewed and examined the patient. I have reviewed the patient's H&P dated: There are no significant changes.     Ready For Surgery From Anesthesia Perspective.     .

## 2022-03-09 NOTE — BRIEF OP NOTE
[facility] - Surgery  Brief Operative Note    Surgery Date: 3/9/2022     Surgeon(s) and Role:     * Jos Solitario MD - Primary    Assisting Surgeon: None    Pre-op Diagnosis:  Malignant neoplasm of lung, unspecified laterality, unspecified part of lung [C34.90]    Post-op Diagnosis:  Post-Op Diagnosis Codes:     * Malignant neoplasm of lung, unspecified laterality, unspecified part of lung [C34.90]    Procedure(s) (LRB):  [id number] (N/A)    Anesthesia: General/MAC    Operative Findings: Access obtained on L with one stick.  Guide wire kept going into IJ and not traversing midline.  Switched to the R side and access obtained with two percutaneous stick.     Estimated Blood Loss: *15 cc's           Specimens:   Specimen (24h ago, onward)            None            Discharge Note    OUTCOME: Patient tolerated treatment/procedure well without complication and is now ready for discharge.    DISPOSITION: Home or Self Care    FINAL DIAGNOSIS:  Malignant neoplasm of lung    FOLLOWUP: In clinic    DISCHARGE INSTRUCTIONS:    Discharge Procedure Orders   Diet general     Call MD for:  extreme fatigue     Call MD for:  persistent dizziness or light-headedness     Call MD for:  hives     Call MD for:  redness, tenderness, or signs of infection (pain, swelling, redness, odor or green/yellow discharge around incision site)     Call MD for:  severe uncontrolled pain     Call MD for:  persistent nausea and vomiting     Call MD for:  temperature >100.4     Call MD for:  difficulty breathing, headache or visual disturbances     Remove dressing in 48 hours     Activity as tolerated

## 2022-03-09 NOTE — PROGRESS NOTES
Pharmacy Treatment Plan Review    Patient  Teresa Earl, 71 y.o.  1950    Indication: NSCLC     Labs:  CBC  Lab Results   Component Value Date    WBC 14.22 (H) 02/14/2022    HGB 13.0 02/14/2022    HCT 42.0 02/14/2022    MCV 95 02/14/2022     02/14/2022       BMP  Lab Results   Component Value Date     12/13/2021    K 4.6 12/13/2021     12/13/2021    CO2 29 12/13/2021    BUN 18 02/14/2022    CREATININE 1.12 02/14/2022    CALCIUM 9.5 12/13/2021    ANIONGAP 6 (L) 12/13/2021    ESTGFRAFRICA 57 (A) 02/14/2022    EGFRNONAA 50 (A) 02/14/2022       LFTs  Lab Results   Component Value Date    ALT 24 07/27/2016    AST 23 07/27/2016    ALKPHOS 74 07/27/2016    BILITOT 0.4 07/27/2016       The patient is scheduled to start the following infusion:   OP NSCLC PACLITAXEL + CARBOPLATIN (AUC) QW + RADIATION    7-day cycle for 6 cycles of:    -Paclitaxel 45 mg/m2 in sodium chloride 0.9% 250 mL, infusion, intravneous, on day 1    -Carboplatin AUC of 2 in sodium chloride 0.9% 250 mL, infusion, intravneous, on day 1    Premeds  -Diphenhydramine 50 mg IVPB  -Famotidine 20 mg IV  -Palonosetron 0.25 mg/Dexamethasone 12 mg IVPB     The treatment plan is per [facility]    Juan Carlos Jennings  PharmD

## 2022-03-09 NOTE — ANESTHESIA POSTPROCEDURE EVALUATION
Anesthesia Post Evaluation    Patient: Teresa Earl    Procedure(s) Performed: Procedure(s) (LRB):  [id number] (N/A)    Final Anesthesia Type: MAC      Patient location during evaluation: PACU  Patient participation: Yes- Able to Participate  Level of consciousness: awake and alert and oriented  Post-procedure vital signs: reviewed and stable  Pain management: adequate  Airway patency: patent    PONV status at discharge: No PONV  Anesthetic complications: no      Cardiovascular status: stable  Respiratory status: unassisted, spontaneous ventilation and room air  Hydration status: euvolemic  Follow-up not needed.          Vitals Value Taken Time   /73 03/09/22 1322   Temp  03/09/22 1346   Pulse 87 03/09/22 1322   Resp 17 03/09/22 1322   SpO2 95 % 03/09/22 1322         No case tracking events are documented in the log.      Pain/Andrei Score: Andrei Score: 10 (3/9/2022  1:17 PM)

## 2022-03-10 ENCOUNTER — INFUSION (OUTPATIENT)
Dept: INFUSION THERAPY | Facility: HOSPITAL | Age: 72
End: 2022-03-10
Attending: INTERNAL MEDICINE
Payer: MEDICARE

## 2022-03-10 ENCOUNTER — PATIENT MESSAGE (OUTPATIENT)
Dept: ADMINISTRATIVE | Facility: OTHER | Age: 72
End: 2022-03-10
Payer: MEDICARE

## 2022-03-10 ENCOUNTER — DOCUMENTATION ONLY (OUTPATIENT)
Dept: RADIATION ONCOLOGY | Facility: CLINIC | Age: 72
End: 2022-03-10
Payer: MEDICARE

## 2022-03-10 ENCOUNTER — DOCUMENTATION ONLY (OUTPATIENT)
Dept: INFUSION THERAPY | Facility: HOSPITAL | Age: 72
End: 2022-03-10

## 2022-03-10 ENCOUNTER — OFFICE VISIT (OUTPATIENT)
Dept: HEMATOLOGY/ONCOLOGY | Facility: CLINIC | Age: 72
End: 2022-03-10
Payer: MEDICARE

## 2022-03-10 VITALS
SYSTOLIC BLOOD PRESSURE: 148 MMHG | HEART RATE: 115 BPM | OXYGEN SATURATION: 95 % | RESPIRATION RATE: 14 BRPM | DIASTOLIC BLOOD PRESSURE: 90 MMHG | TEMPERATURE: 97 F | HEIGHT: 68 IN | BODY MASS INDEX: 22.09 KG/M2 | WEIGHT: 145.75 LBS

## 2022-03-10 VITALS
DIASTOLIC BLOOD PRESSURE: 74 MMHG | HEIGHT: 68 IN | SYSTOLIC BLOOD PRESSURE: 137 MMHG | BODY MASS INDEX: 22.13 KG/M2 | DIASTOLIC BLOOD PRESSURE: 78 MMHG | RESPIRATION RATE: 14 BRPM | WEIGHT: 146 LBS | OXYGEN SATURATION: 95 % | HEART RATE: 85 BPM | TEMPERATURE: 98 F | SYSTOLIC BLOOD PRESSURE: 160 MMHG | HEART RATE: 98 BPM

## 2022-03-10 DIAGNOSIS — C34.12 MALIGNANT NEOPLASM OF UPPER LOBE OF LEFT LUNG: Primary | ICD-10-CM

## 2022-03-10 DIAGNOSIS — Z79.899 IMMUNODEFICIENCY DUE TO DRUGS: ICD-10-CM

## 2022-03-10 DIAGNOSIS — E11.9 TYPE 2 DIABETES MELLITUS WITHOUT COMPLICATION, WITHOUT LONG-TERM CURRENT USE OF INSULIN: ICD-10-CM

## 2022-03-10 DIAGNOSIS — C34.12 CANCER OF UPPER LOBE OF LEFT LUNG: Primary | ICD-10-CM

## 2022-03-10 DIAGNOSIS — D84.821 IMMUNODEFICIENCY DUE TO DRUGS: ICD-10-CM

## 2022-03-10 PROCEDURE — 96367 TX/PROPH/DG ADDL SEQ IV INF: CPT | Mod: PN

## 2022-03-10 PROCEDURE — 96375 TX/PRO/DX INJ NEW DRUG ADDON: CPT | Mod: PN

## 2022-03-10 PROCEDURE — 63600175 PHARM REV CODE 636 W HCPCS: Mod: PN | Performed by: INTERNAL MEDICINE

## 2022-03-10 PROCEDURE — 99215 PR OFFICE/OUTPT VISIT, EST, LEVL V, 40-54 MIN: ICD-10-PCS | Mod: S$PBB,,, | Performed by: INTERNAL MEDICINE

## 2022-03-10 PROCEDURE — 99999 PR PBB SHADOW E&M-EST. PATIENT-LVL V: CPT | Mod: PBBFAC,,, | Performed by: INTERNAL MEDICINE

## 2022-03-10 PROCEDURE — 99215 OFFICE O/P EST HI 40 MIN: CPT | Mod: PBBFAC,PN | Performed by: INTERNAL MEDICINE

## 2022-03-10 PROCEDURE — 99215 OFFICE O/P EST HI 40 MIN: CPT | Mod: S$PBB,,, | Performed by: INTERNAL MEDICINE

## 2022-03-10 PROCEDURE — 77014 PR  CT GUIDANCE PLACEMENT RAD THERAPY FIELDS: ICD-10-PCS | Mod: 26,,, | Performed by: STUDENT IN AN ORGANIZED HEALTH CARE EDUCATION/TRAINING PROGRAM

## 2022-03-10 PROCEDURE — 96413 CHEMO IV INFUSION 1 HR: CPT | Mod: PN

## 2022-03-10 PROCEDURE — 25000003 PHARM REV CODE 250: Mod: PN | Performed by: INTERNAL MEDICINE

## 2022-03-10 PROCEDURE — 77014 PR  CT GUIDANCE PLACEMENT RAD THERAPY FIELDS: CPT | Mod: 26,,, | Performed by: STUDENT IN AN ORGANIZED HEALTH CARE EDUCATION/TRAINING PROGRAM

## 2022-03-10 PROCEDURE — A4216 STERILE WATER/SALINE, 10 ML: HCPCS | Mod: PN | Performed by: INTERNAL MEDICINE

## 2022-03-10 PROCEDURE — 99999 PR PBB SHADOW E&M-EST. PATIENT-LVL V: ICD-10-PCS | Mod: PBBFAC,,, | Performed by: INTERNAL MEDICINE

## 2022-03-10 PROCEDURE — 96415 CHEMO IV INFUSION ADDL HR: CPT | Mod: PN

## 2022-03-10 PROCEDURE — 96417 CHEMO IV INFUS EACH ADDL SEQ: CPT | Mod: PN

## 2022-03-10 PROCEDURE — 77014 HC CT GUIDANCE RADIATION THERAPY FLDS PLACEMENT: CPT | Mod: TC,PN | Performed by: STUDENT IN AN ORGANIZED HEALTH CARE EDUCATION/TRAINING PROGRAM

## 2022-03-10 PROCEDURE — 77386 HC IMRT, COMPLEX: CPT | Mod: PN | Performed by: STUDENT IN AN ORGANIZED HEALTH CARE EDUCATION/TRAINING PROGRAM

## 2022-03-10 RX ORDER — HEPARIN 100 UNIT/ML
500 SYRINGE INTRAVENOUS
Status: DISCONTINUED | OUTPATIENT
Start: 2022-03-10 | End: 2022-03-10 | Stop reason: HOSPADM

## 2022-03-10 RX ORDER — SODIUM CHLORIDE 0.9 % (FLUSH) 0.9 %
10 SYRINGE (ML) INJECTION
Status: DISCONTINUED | OUTPATIENT
Start: 2022-03-10 | End: 2022-03-10 | Stop reason: HOSPADM

## 2022-03-10 RX ORDER — DIPHENHYDRAMINE HCL 50 MG
50 CAPSULE ORAL
Status: COMPLETED | OUTPATIENT
Start: 2022-03-10 | End: 2022-03-10

## 2022-03-10 RX ORDER — FAMOTIDINE 10 MG/ML
20 INJECTION INTRAVENOUS
Status: COMPLETED | OUTPATIENT
Start: 2022-03-10 | End: 2022-03-10

## 2022-03-10 RX ORDER — FAMOTIDINE 10 MG/ML
20 INJECTION INTRAVENOUS
Status: CANCELLED | OUTPATIENT
Start: 2022-03-10

## 2022-03-10 RX ORDER — EPINEPHRINE 0.3 MG/.3ML
0.3 INJECTION SUBCUTANEOUS ONCE AS NEEDED
Status: DISCONTINUED | OUTPATIENT
Start: 2022-03-10 | End: 2022-03-10 | Stop reason: HOSPADM

## 2022-03-10 RX ORDER — HEPARIN 100 UNIT/ML
500 SYRINGE INTRAVENOUS
Status: CANCELLED | OUTPATIENT
Start: 2022-03-10

## 2022-03-10 RX ORDER — SODIUM CHLORIDE 0.9 % (FLUSH) 0.9 %
10 SYRINGE (ML) INJECTION
Status: CANCELLED | OUTPATIENT
Start: 2022-03-10

## 2022-03-10 RX ORDER — EPINEPHRINE 0.3 MG/.3ML
0.3 INJECTION SUBCUTANEOUS ONCE AS NEEDED
Status: CANCELLED | OUTPATIENT
Start: 2022-03-10

## 2022-03-10 RX ORDER — ONDANSETRON HYDROCHLORIDE 8 MG/1
8 TABLET, FILM COATED ORAL DAILY PRN
Qty: 40 TABLET | Refills: 1 | Status: SHIPPED | OUTPATIENT
Start: 2022-03-10 | End: 2022-01-01 | Stop reason: SDUPTHER

## 2022-03-10 RX ORDER — PROMETHAZINE HYDROCHLORIDE 25 MG/1
25 TABLET ORAL EVERY 6 HOURS PRN
Qty: 60 TABLET | Refills: 7 | Status: SHIPPED | OUTPATIENT
Start: 2022-03-10 | End: 2022-08-08

## 2022-03-10 RX ORDER — DIPHENHYDRAMINE HYDROCHLORIDE 50 MG/ML
50 INJECTION INTRAMUSCULAR; INTRAVENOUS ONCE AS NEEDED
Status: CANCELLED | OUTPATIENT
Start: 2022-03-10

## 2022-03-10 RX ORDER — DIPHENHYDRAMINE HYDROCHLORIDE 50 MG/ML
50 INJECTION INTRAMUSCULAR; INTRAVENOUS ONCE AS NEEDED
Status: DISCONTINUED | OUTPATIENT
Start: 2022-03-10 | End: 2022-03-10 | Stop reason: HOSPADM

## 2022-03-10 RX ADMIN — FAMOTIDINE 20 MG: 10 INJECTION INTRAVENOUS at 10:03

## 2022-03-10 RX ADMIN — DIPHENHYDRAMINE HYDROCHLORIDE 50 MG: 50 CAPSULE ORAL at 09:03

## 2022-03-10 RX ADMIN — Medication 10 ML: at 01:03

## 2022-03-10 RX ADMIN — SODIUM CHLORIDE: 0.9 INJECTION, SOLUTION INTRAVENOUS at 09:03

## 2022-03-10 RX ADMIN — CARBOPLATIN 140 MG: 10 INJECTION, SOLUTION INTRAVENOUS at 12:03

## 2022-03-10 RX ADMIN — Medication 500 UNITS: at 01:03

## 2022-03-10 RX ADMIN — PACLITAXEL 78 MG: 6 INJECTION, SOLUTION, CONCENTRATE INTRAVENOUS at 10:03

## 2022-03-10 RX ADMIN — PALONOSETRON: 0.05 INJECTION, SOLUTION INTRAVENOUS at 09:03

## 2022-03-10 NOTE — Clinical Note
Schedule CBC,CMp, mag and see me or Chhaya every week (alternate with me or Sarabetween the weeks) X 5 more weeks and for weekly Carboplatin and Taxol

## 2022-03-10 NOTE — PROGRESS NOTES
Oncology   Chemotherapy Infusion Visit    Quick Social Service Status Follow Up   Met w/ pt and her son, Chaim  briefly to follow up on social and emotional needs since initiation of treatment.    The pt reported feeling ok and not very anxious considering it is her first day of treatment.    This LCSW informed the with additional information regarding the TFP and other support programs. The pt was interested and an emergency food box form was filled out and a food box and produce was provided to the pt.    Additionally, the pt and her son, Chaim had questions regarding obtaining a Power of  so the family (the patients 3 adult children) can call the cancer center to obtain medical information/questions about their mom, the pt.     This LCSW explained to the pt and Bill that we have a form called the Involvement of care form that the pt can fill out and list who she wants to be able to have the right to her information. The pt filled out the form to be placed in media in pt's record. This LCSW informed the pt that the Involvement of care form can be discontinued upon the pt's choice at anytime.    The pt and the son stated that they still may want to get a Power of  and will look into getting a [place] power of  form online and have it notarized.   Additionally, the pt and her son inquired about the [employer] and where to obtain the blood pressure machine etc. This LCSW explaiend the process of how the [employer] works and where it is located.   The pt's son met me downstairs near the [employer] and introduced his wife, Lorna and asked me to explain the above mentioned information to her.  Further, Chaim stated his grandmother is being moved from one nursing home to another today so he will have to go take care of that.                Carla Martinez, LETICIA  03/10/2022  11:46 AM

## 2022-03-10 NOTE — PROGRESS NOTES
"Subjective:       Patient ID: Teresa Earl is a 71 y.o. female.    Chief Complaint: Lung Cancer (Malignant neoplasm of upper lobe of left lung; )  Mrs. Earl is a 71 year old female who underwent a chest xray in September 2022 prior to a discectomy and that showed a lung mass.      She then underwent CT chest also on September 2022 which showed spiculated mass in anterior segment GRACIELA, multiple borderline/enlarged mediastinal lymph nodes.     PFT's on 11/5/21 revealed FVC 1.96 (61%), FEV1 1.47 (60%), TLC 73%, DLCO 9.18 (47%)     PET/CT on 11/11/2022 reveals  FDG avid, spiculated pleural based mass in the GRACIELA extending into the anterior mediastinum, measuring 3.5cm. SUV 8.5. no enlarged or FDG avid lymphadenopathy in chest. No distant mets but FDG avid L parotid lesion     Navigational bronch on 12/13/2021 revealed "no endobronchial disease medial GRACIELA sampled. EBUS for station 4L, 7, 11. Path: GRACIELA suspicious but not diagnostic for malignancy. 4L, 7, 11L negative for malignancy"     On 1/25/22: he met with CT surgery and is felt not to be a candidate for resection due to invasion of mediastinum.     MRI brain on 2/12/2022 revealed  no intracranial mets     CT Guided biopsy on 2/24/2022 reveals  of GRACIELA demonstrating squamous cell carcinoma. Left parotid gland demonstrating pleomorphic adenomal    HPIRepeat CT scans on 2/28/2022 revealed "56 x 39 x 15 mm spiculated pleural-based left upper lobe mass (compatible with biopsy proven diagnosis of squamous cell carcinoma) which abuts and invades the anterior mediastinum with compression and/or invasion of the brachial cephalic vein and intimate association with the left internal mammary vessels and anterior margin of the thoracic aortic arch. Scattered solid pulmonary nodules measuring up to 3 mm in size for which short-term imaging follow-up is recommended. Centrilobular emphysematous lung architecture.  21 x 22 mm hypodense area within the medial segment of the left hepatic " "lobe abutting the interlobar fissure.  While it is possible that this represents focal fatty infiltration, an underlying solitary liver lesion or focus of metastatic disease cannot be excluded.  The patient's recent PET-CT scan showed no abnormal FDG hypermetabolism in this vicinity.  Consider additional investigation with contrast enhanced MRI of the liver. Stable 26 x 39 mm left adnexal hypodensity which show no FDG hypermetabolism at the time of the patient's recent PET-CT scan. Tiny fat containing umbilical hernia. 12 mm short axis right paratracheal lymph node which showed no FDG hypermetabolism at the time of the patient's recent PET-CT scan and which remains unchanged in size since that study"  Follow-up MRI abdomen on 3/3/2022 reveals "Hypoattenuating area on prior CT near the falciform ligament corresponds to an area of focal fatty infiltration.  No suspicious liver lesions are present"  She comes in to start definitive concurrent chemo/RT with Carboplatin and Taxol. She is accompanied by her son and daughter. She denies any new issues. ECOG PS is 1.      Review of Systems   Constitutional: Negative for appetite change, fatigue and unexpected weight change.   HENT: Negative for mouth sores.    Eyes: Negative for visual disturbance.   Respiratory: Positive for cough and shortness of breath.    Cardiovascular: Negative for chest pain.   Gastrointestinal: Negative for abdominal pain and diarrhea.   Genitourinary: Negative for frequency.   Musculoskeletal: Negative for back pain.   Integumentary:  Negative for rash.   Neurological: Negative for headaches.   Hematological: Negative for adenopathy.   Psychiatric/Behavioral: The patient is not nervous/anxious.    All other systems reviewed and are negative.        Objective:      Physical Exam  Vitals reviewed.   Constitutional:       Appearance: She is well-developed.   HENT:      Mouth/Throat:      Pharynx: No oropharyngeal exudate.   Cardiovascular:      Rate " and Rhythm: Normal rate.      Heart sounds: Normal heart sounds.   Pulmonary:      Effort: Pulmonary effort is normal.      Breath sounds: Normal breath sounds. No wheezing.   Abdominal:      General: Bowel sounds are normal.      Palpations: Abdomen is soft.      Tenderness: There is no abdominal tenderness.   Musculoskeletal:         General: No tenderness.   Lymphadenopathy:      Cervical: No cervical adenopathy.   Skin:     General: Skin is warm and dry.      Findings: No rash.   Neurological:      Mental Status: She is alert and oriented to person, place, and time.      Coordination: Coordination normal.   Psychiatric:         Thought Content: Thought content normal.         Judgment: Judgment normal.           LABS:  WBC   Date Value Ref Range Status   03/10/2022 14.69 (H) 3.90 - 12.70 K/uL Final     Hemoglobin   Date Value Ref Range Status   03/10/2022 12.8 12.0 - 16.0 g/dL Final     Hematocrit   Date Value Ref Range Status   03/10/2022 42.2 37.0 - 48.5 % Final     Platelets   Date Value Ref Range Status   03/10/2022 343 150 - 450 K/uL Final     Gran # (ANC)   Date Value Ref Range Status   03/10/2022 11.1 (H) 1.8 - 7.7 K/uL Final     Gran %   Date Value Ref Range Status   03/10/2022 75.1 (H) 38.0 - 73.0 % Final       Chemistry        Component Value Date/Time     03/10/2022 0745    K 4.6 03/10/2022 0745     03/10/2022 0745    CO2 25 03/10/2022 0745    BUN 17 03/10/2022 0745    CREATININE 1.2 03/10/2022 0745     (H) 03/10/2022 0745        Component Value Date/Time    CALCIUM 9.7 03/10/2022 0745    ALKPHOS 110 03/10/2022 0745    AST 11 03/10/2022 0745    ALT 7 (L) 03/10/2022 0745    BILITOT 0.3 03/10/2022 0745    ESTGFRAFRICA 53 (A) 03/10/2022 0745    EGFRNONAA 46 (A) 03/10/2022 0745        Ma.8    Assessment:       Problem List Items Addressed This Visit     Cancer of upper lobe of left lung - Primary    Relevant Medications    promethazine (PHENERGAN) 25 MG tablet    ondansetron  (ZOFRAN) 8 MG tablet    Other Relevant Orders    CBC Oncology    Comprehensive Metabolic Panel    Magnesium    Comprehensive Metabolic Panel    CBC Oncology    Magnesium    Immunodeficiency due to drugs    Type 2 diabetes mellitus without complication          Plan:           Reviewed scans, she has stage III lung cancer, will start treatment with definitive concurrent chemo/RT to start today with weekly Carboplatin and Taxol followed by consolidative Imfinzi.  Side effects of chemotherapy were discussed and consents were signed.     She will return weekly for chemo and will see either me or NP before being cleared for chemo    Escribed phenergan and Zofran.  Also enrolled in chemo care     Will complete [facility] docs next visit    Above care plan was discussed with patient and accompanying son and daughter and all questions were addressed to their satisfaction

## 2022-03-10 NOTE — PLAN OF CARE
Completed 1 of 30 outpatient radiation treatments without difficulty.  Education provided including lung pamphlet.  All questions/concerns addressed this visit.  Patient verbalizes understanding.

## 2022-03-11 ENCOUNTER — DOCUMENTATION ONLY (OUTPATIENT)
Dept: INFUSION THERAPY | Facility: HOSPITAL | Age: 72
End: 2022-03-11
Payer: MEDICARE

## 2022-03-11 ENCOUNTER — OFFICE VISIT (OUTPATIENT)
Dept: PSYCHIATRY | Facility: CLINIC | Age: 72
End: 2022-03-11
Payer: MEDICARE

## 2022-03-11 DIAGNOSIS — F43.22 ADJUSTMENT DISORDER WITH ANXIETY: Primary | ICD-10-CM

## 2022-03-11 PROCEDURE — 90832 PSYTX W PT 30 MINUTES: CPT | Mod: FQ,95,, | Performed by: CASE MANAGER/CARE COORDINATOR

## 2022-03-11 PROCEDURE — 77014 PR  CT GUIDANCE PLACEMENT RAD THERAPY FIELDS: ICD-10-PCS | Mod: 26,,, | Performed by: STUDENT IN AN ORGANIZED HEALTH CARE EDUCATION/TRAINING PROGRAM

## 2022-03-11 PROCEDURE — 77014 HC CT GUIDANCE RADIATION THERAPY FLDS PLACEMENT: CPT | Mod: TC,PN | Performed by: STUDENT IN AN ORGANIZED HEALTH CARE EDUCATION/TRAINING PROGRAM

## 2022-03-11 PROCEDURE — 77386 HC IMRT, COMPLEX: CPT | Mod: PN | Performed by: STUDENT IN AN ORGANIZED HEALTH CARE EDUCATION/TRAINING PROGRAM

## 2022-03-11 PROCEDURE — 90832 PR PSYCHOTHERAPY W/PATIENT, 30 MIN: ICD-10-PCS | Mod: FQ,95,, | Performed by: CASE MANAGER/CARE COORDINATOR

## 2022-03-11 PROCEDURE — 77014 PR  CT GUIDANCE PLACEMENT RAD THERAPY FIELDS: CPT | Mod: 26,,, | Performed by: STUDENT IN AN ORGANIZED HEALTH CARE EDUCATION/TRAINING PROGRAM

## 2022-03-11 NOTE — PROGRESS NOTES
Established Patient - Audio Only Telehealth Visit     The patient location is: in car on way to radiation appointment (not driving). Was in [facility] parking lot at end of the session. Reported that her son and daughter-in-law were in the car and patient stated she was fine with them hearing the session.  The chief complaint leading to consultation is: Adjustment to Cancer treatment  Visit type: Virtual visit with audio only (telephone)  Total time spent with patient: 16 minutes       The reason for the audio only service rather than synchronous audio and video virtual visit was related to technical difficulties or patient preference/necessity.     Each patient to whom I provide medical services by telemedicine is:  (1) informed of the relationship between the physician and patient and the respective role of any other health care provider with respect to management of the patient; and (2) notified that they may decline to receive medical services by telemedicine and may withdraw from such care at any time. Patient verbally consented to receive this service via voice-only telephone call.    PSYCHO-ONCOLOGY NOTE/ Individual Psychotherapy     Date: 3/11/2022   Site:  [facility]      Therapeutic Intervention: Met with patient.  Outpatient - Behavior modifying psychotherapy 30 min - CPT code 99551 and Outpatient - Supportive psychotherapy 30 min - CPT Code 44593    Patient was last seen by me on 2/18/2022    Problem list  Patient Active Problem List   Diagnosis    Opiate overdose    Acute kidney injury    Encephalopathy    Nausea and vomiting    Cervical radiculopathy    Type 2 diabetes mellitus without complication    Leukocytosis    Viral illness    Mass of upper lobe of left lung    Cigarette smoker    Cancer of upper lobe of left lung    Malignant neoplasm of upper lobe of left lung    Malignant neoplasm of lung    Immunodeficiency due to drugs       Chief complaint/reason for  encounter: anxiety   Met with patient to evaluate psychosocial adaptation to diagnosis and treatment of lung cancer.    Current Medications  Current Outpatient Medications   Medication    albuterol (PROVENTIL/VENTOLIN HFA) 90 mcg/actuation inhaler    BREO ELLIPTA 100-25 mcg/dose diskus inhaler    buprenorphine HCL (SUBUTEX) 8 mg Subl    duke's soln (benadryl 30 mL, mylanta 30 mL, LIDOcaine 30 mL, nystatin 30 mL) 120mL    FLUoxetine 20 MG capsule    fluticasone furoate-vilanteroL (BREO ELLIPTA) 100-25 mcg/dose diskus inhaler    fluticasone furoate-vilanteroL (BREO ELLIPTA) 100-25 mcg/dose diskus inhaler    gabapentin (NEURONTIN) 800 MG tablet    HYDROcodone-acetaminophen (NORCO) 5-325 mg per tablet    JARDIANCE 25 mg tablet    LIDOcaine (LIDODERM) 5 %    LIDOcaine (LIDODERM) 5 %    LIDOcaine-prilocaine (EMLA) cream    lovastatin (MEVACOR) 10 MG tablet    metformin (GLUCOPHAGE) 1000 MG tablet    ondansetron (ZOFRAN) 8 MG tablet    promethazine (PHENERGAN) 12.5 MG Tab    promethazine (PHENERGAN) 25 MG tablet    traZODone (DESYREL) 50 MG tablet     No current facility-administered medications for this visit.       Objective:  Teresa Earl answered the phone promptly for the session. Her son and daughter-in-law were able to hear the conversation over the phone with patient's consent.  The patient was fully cooperative throughout the session.  Appearance: N/A due to session being over the phone.  Behavior/Cooperation: friendly and cooperative  Speech: normal in rate, volume, and tone and appropriate quality, quantity and organization of sentences  Mood: steady  Affect: appropriate and calm based on her voice  Thought Process: goal-directed, logical  Thought Content: normal,  No delusions or paranoia; did not appear to be responding to internal stimuli during the session  Orientation: grossly intact  Memory: grossly intact  Attention Span/Concentration: Attends to session without distraction  Fund of  Knowledge: average  Estimate of Intelligence: average from verbal skills and history  Cognition: grossly intact  Insight: patient has awareness of illness; good insight into own behavior and behavior of others  Judgment: the patient's behavior is adequate to circumstances    Interval history and content of current session: Patient stated that she has been practicing the previously learned deep breathing exercise when she is stressed including when she gets scans done. She noted that it has been effective in lowering her stress. Discussed current adaptation to disease and treatment status. Reports to be coping in an adequate manner at this time. Evaluated cognitive response, paying particular attention to negative intrusive thoughts of a persistent and detrimental nature. Patient denied any irritability but stated she felt scared prior to her chemotherapy yesterday. She noted the fear was associated to how painful it might be. Patient noted it was not as painful as she thought it would be. Thoughts of this type are in evidence with mild distress. Provided cognitive behavioral therapy to address negative cognitions.  Discussed challenging thoughts to come up with more realistic possibilities. Evaluated psychosocial and environmental stressors secondary to diagnosis and treatment. Patient noted that her son has helped with patient's mother's care. This has reportedly decreased patient's stress. Examined proactive behaviors that may be implemented to minimize or ameliorate psychosocial stressors secondary to diagnosis and treatment. Patient stated that she has been enjoying watching movies. She stated she does not do many enjoyable activities at this time. Patient was reminded how pleasant activities can lead to more positive thoughts and feelings.     Risk parameters:   Patient reports no suicidal ideation  Patient reports no homicidal ideation  Patient reports no self-injurious behavior  Patient reports no violent  behavior   Safety needs:  None at this time      Verbal deficits: None     Patient's response to intervention:The patient's response to intervention is accepting.     Progress toward goals and other mental status changes:  The patient's progress toward goals is good.      Progress to date:Progress as Expected      Goals from last visit: Partially met and continuing to progress        Patient Strengths: verbal, good familial support, commitment to wellness,       Treatment Plan:individual psychotherapy  · Target symptoms: anxiety   · Why chosen therapy is appropriate versus another modality: relevant to diagnosis, patient responds to this modality, evidence based practice  · Outcome monitoring methods: self-report  · Therapeutic intervention type: behavior modifying psychotherapy, supportive psychotherapy  · Prognosis: Good      Behavioral goals:      Stress management: Continue practicing deep breathing exercise   Therapy: Increase Pleasant Event Scheduling    Return to clinic: 1 month     Length of Service (minutes direct face-to-face contact): 16 minutes    Diagnosis:     ICD-10-CM ICD-9-CM   1. Adjustment disorder with anxiety  F43.22 309.24             Ana Cristina Pickens License #[id number]               Ana Cristina Contreras License #[id number]  [place] License #[id number]          This service was not originating from a related E/M service provided within the previous 7 days nor will  to an E/M service or procedure within the next 24 hours or my soonest available appointment.  Prevailing standard of care was able to be met in this audio-only visit.

## 2022-03-11 NOTE — PROGRESS NOTES
SW assisted pt with getting food from [employer] to her car. Pt grateful for the assistance.   SW to follow.    Rhonda Bill, JENNYW

## 2022-03-14 PROCEDURE — 77014 PR  CT GUIDANCE PLACEMENT RAD THERAPY FIELDS: CPT | Mod: 26,,, | Performed by: STUDENT IN AN ORGANIZED HEALTH CARE EDUCATION/TRAINING PROGRAM

## 2022-03-14 PROCEDURE — 77014 PR  CT GUIDANCE PLACEMENT RAD THERAPY FIELDS: ICD-10-PCS | Mod: 26,,, | Performed by: STUDENT IN AN ORGANIZED HEALTH CARE EDUCATION/TRAINING PROGRAM

## 2022-03-14 PROCEDURE — 77386 HC IMRT, COMPLEX: CPT | Mod: PN | Performed by: STUDENT IN AN ORGANIZED HEALTH CARE EDUCATION/TRAINING PROGRAM

## 2022-03-14 PROCEDURE — 77014 HC CT GUIDANCE RADIATION THERAPY FLDS PLACEMENT: CPT | Mod: TC,PN | Performed by: STUDENT IN AN ORGANIZED HEALTH CARE EDUCATION/TRAINING PROGRAM

## 2022-03-14 NOTE — OP NOTE
Date of procedure:  March 14, 2022    Staff surgeon:  Dr. Jos Solitario    Preoperative diagnosis:  Cancer    Postoperative diagnosis:  The same    Procedure:  Placement of right subclavian Port-A-Cath    Anesthesia:  Monitored anesthesia    Indication for procedure:  Pleasant 71-year-old female who was in need of port to begin chemotherapy.  Patient is scheduled for port placement on the above-mentioned date.    Description of procedure:  Following signing of informed consent patient was taken to the operating room and placed in supine position.  SCDs were placed. Monitored anesthesia was administered without event.  Both arms were tucked and shoulder roll was placed. The patient was prepped and draped in standard fashion. An appropriate time-out procedure was then performed. Patient is placed in Trendelenburg. I obtained access to the left subclavian vein with one percutaneous stick.  Guidewire continuously goes into the left IJ.  Multiple attempts were made to guide the wire across the midline to the confluence of the subclavian veins.  Multiple times were made and all unsuccessful.  At this point decision is made to go to the right side.  Access the right subclavian vein was obtained into percutaneous sticks.  Guidewire was placed and position confirmed with fluoroscopy.  Next I make an incision incorporating the insertion site into my incision. I next took the tunneling sheath and dilator passed over the guidewire under fluoroscopic visualization. I removed the guidewire and dilator.  I next passed the catheter through the tunneling sheath and removed the tunneling sheath.  I used fluoroscopy to position the tip of the catheter such that the proximal tip is at the confluence of the subclavian vein. I then cut the distal tip of the catheter.  I secured the catheter to the port. I secure the port down to the chest wall with 2 Vicryl suture. Skin incision closed with 3 0 Vicryl and 4 0 Monocryl.  Patient was  awakened taken recovery room stable condition there were no immediate complications blood loss 10 cc.  Port was aspirated and flushed easily.  POrt is ok to use if CxR is without complication.

## 2022-03-15 ENCOUNTER — DOCUMENTATION ONLY (OUTPATIENT)
Dept: RADIATION ONCOLOGY | Facility: CLINIC | Age: 72
End: 2022-03-15
Payer: MEDICARE

## 2022-03-15 ENCOUNTER — HOSPITAL ENCOUNTER (OUTPATIENT)
Dept: RADIOLOGY | Facility: HOSPITAL | Age: 72
Discharge: HOME | End: 2022-03-15
Attending: STUDENT IN AN ORGANIZED HEALTH CARE EDUCATION/TRAINING PROGRAM
Payer: MEDICARE

## 2022-03-15 ENCOUNTER — TELEPHONE (OUTPATIENT)
Dept: HEMATOLOGY/ONCOLOGY | Facility: CLINIC | Age: 72
End: 2022-03-15
Payer: MEDICARE

## 2022-03-15 DIAGNOSIS — R07.9 CHEST PAIN, UNSPECIFIED TYPE: ICD-10-CM

## 2022-03-15 DIAGNOSIS — R00.0 TACHYCARDIA: ICD-10-CM

## 2022-03-15 DIAGNOSIS — R91.8 MASS OF UPPER LOBE OF LEFT LUNG: Primary | ICD-10-CM

## 2022-03-15 DIAGNOSIS — R91.8 MASS OF UPPER LOBE OF LEFT LUNG: ICD-10-CM

## 2022-03-15 PROCEDURE — 25500020 PHARM REV CODE 255: Mod: PO | Performed by: STUDENT IN AN ORGANIZED HEALTH CARE EDUCATION/TRAINING PROGRAM

## 2022-03-15 PROCEDURE — 77014 PR  CT GUIDANCE PLACEMENT RAD THERAPY FIELDS: ICD-10-PCS | Mod: 26,,, | Performed by: STUDENT IN AN ORGANIZED HEALTH CARE EDUCATION/TRAINING PROGRAM

## 2022-03-15 PROCEDURE — 77386 HC IMRT, COMPLEX: CPT | Mod: PN | Performed by: STUDENT IN AN ORGANIZED HEALTH CARE EDUCATION/TRAINING PROGRAM

## 2022-03-15 PROCEDURE — 77014 HC CT GUIDANCE RADIATION THERAPY FLDS PLACEMENT: CPT | Mod: TC,PN | Performed by: STUDENT IN AN ORGANIZED HEALTH CARE EDUCATION/TRAINING PROGRAM

## 2022-03-15 PROCEDURE — 71275 CT ANGIOGRAPHY CHEST: CPT | Mod: 26,,, | Performed by: RADIOLOGY

## 2022-03-15 PROCEDURE — 77014 PR  CT GUIDANCE PLACEMENT RAD THERAPY FIELDS: CPT | Mod: 26,,, | Performed by: STUDENT IN AN ORGANIZED HEALTH CARE EDUCATION/TRAINING PROGRAM

## 2022-03-15 PROCEDURE — 71275 CT ANGIOGRAPHY CHEST: CPT | Mod: TC,PO

## 2022-03-15 PROCEDURE — 71275 CTA CHEST NON CORONARY: ICD-10-PCS | Mod: 26,,, | Performed by: RADIOLOGY

## 2022-03-15 RX ADMIN — IOHEXOL 100 ML: 350 INJECTION, SOLUTION INTRAVENOUS at 11:03

## 2022-03-15 NOTE — TELEPHONE ENCOUNTER
"Spoke with patient's daughter, deanne. She is calling because her mom's outside pain management MD will not change her mom's pain regimen, SUBUTEX. She saw palliative at [facility] ---and they referred her to pain management here----and pain management informed her she was "doctor shopping" so they wouldn't schedule her.    Patient has chronic cervical stenosis which causes her tremendous pain---and now cancer related pain on top of it--and she feels like no one is willing to help her.   Daughter states her mom is only on week one of treatment and she is wanting to quit everything. Her pain is not controlled at all.    Nurse informed daughter she would speak with dr dickens, dr oseguera, and dr nguyen to see what can be done to help her.    Message routed to oumar estrada muller  "

## 2022-03-15 NOTE — TELEPHONE ENCOUNTER
----- Message from Sri Garrett sent at 3/15/2022 12:49 PM CDT -----  Type: Needs Medical Advice  Who Called:  Chela (daughter)  Symptoms (please be specific):    How long has patient had these symptoms:    Pharmacy name and phone #:    Best Call Back Number:   Additional Information: Ms. York is requesting a call back from the nurse regarding her mom pain mgmt.. She would like to also like to speak with Dr. Styles.

## 2022-03-15 NOTE — PLAN OF CARE
Completed 4 of 30 outpatient radiation treatments without difficulty.  No new problems noted.  All questions/concerns addressed by MD this visit.

## 2022-03-15 NOTE — PROGRESS NOTES
Radiation Oncology Note    I called and let Ms. Earl know that her CTA did not have evidence of PE. Of note the primary lesion enlarged, will register with RT plan to ensure adequate coverage. I discussed that a negative CTA does not mean that this is not a cardiac reason for her CT. She did not want to go to ED and will watch for any worsening symptoms or new issues.

## 2022-03-16 ENCOUNTER — TELEPHONE (OUTPATIENT)
Dept: PALLIATIVE MEDICINE | Facility: CLINIC | Age: 72
End: 2022-03-16
Payer: MEDICARE

## 2022-03-16 PROCEDURE — 77014 PR  CT GUIDANCE PLACEMENT RAD THERAPY FIELDS: CPT | Mod: 26,,, | Performed by: STUDENT IN AN ORGANIZED HEALTH CARE EDUCATION/TRAINING PROGRAM

## 2022-03-16 PROCEDURE — 77386 HC IMRT, COMPLEX: CPT | Mod: PN | Performed by: STUDENT IN AN ORGANIZED HEALTH CARE EDUCATION/TRAINING PROGRAM

## 2022-03-16 PROCEDURE — 77014 HC CT GUIDANCE RADIATION THERAPY FLDS PLACEMENT: CPT | Mod: TC,PN | Performed by: STUDENT IN AN ORGANIZED HEALTH CARE EDUCATION/TRAINING PROGRAM

## 2022-03-16 PROCEDURE — 77014 PR  CT GUIDANCE PLACEMENT RAD THERAPY FIELDS: ICD-10-PCS | Mod: 26,,, | Performed by: STUDENT IN AN ORGANIZED HEALTH CARE EDUCATION/TRAINING PROGRAM

## 2022-03-16 PROCEDURE — 77336 RADIATION PHYSICS CONSULT: CPT | Mod: PN | Performed by: STUDENT IN AN ORGANIZED HEALTH CARE EDUCATION/TRAINING PROGRAM

## 2022-03-16 NOTE — TELEPHONE ENCOUNTER
Spoke with pt regarding coming in tomorrow  to be seen By Dr Shah pt has other scheduled appointments at a different location and is unable to come here. In formed pt I will look out for any cancellations

## 2022-03-16 NOTE — TELEPHONE ENCOUNTER
attempted to reach pt regarding coming in tomorrow to a palliative medicine appt unable to leave voicemail mailbox I full

## 2022-03-17 ENCOUNTER — INFUSION (OUTPATIENT)
Dept: INFUSION THERAPY | Facility: HOSPITAL | Age: 72
End: 2022-03-17
Attending: INTERNAL MEDICINE
Payer: MEDICARE

## 2022-03-17 ENCOUNTER — DOCUMENTATION ONLY (OUTPATIENT)
Dept: INFUSION THERAPY | Facility: HOSPITAL | Age: 72
End: 2022-03-17

## 2022-03-17 ENCOUNTER — PATIENT MESSAGE (OUTPATIENT)
Dept: HEMATOLOGY/ONCOLOGY | Facility: CLINIC | Age: 72
End: 2022-03-17

## 2022-03-17 ENCOUNTER — OFFICE VISIT (OUTPATIENT)
Dept: HEMATOLOGY/ONCOLOGY | Facility: CLINIC | Age: 72
End: 2022-03-17
Payer: MEDICARE

## 2022-03-17 VITALS
DIASTOLIC BLOOD PRESSURE: 80 MMHG | SYSTOLIC BLOOD PRESSURE: 146 MMHG | HEART RATE: 121 BPM | OXYGEN SATURATION: 96 % | RESPIRATION RATE: 18 BRPM | BODY MASS INDEX: 21.48 KG/M2 | WEIGHT: 141.75 LBS | HEIGHT: 68 IN | TEMPERATURE: 99 F

## 2022-03-17 VITALS
RESPIRATION RATE: 19 BRPM | HEART RATE: 101 BPM | DIASTOLIC BLOOD PRESSURE: 75 MMHG | TEMPERATURE: 99 F | SYSTOLIC BLOOD PRESSURE: 138 MMHG

## 2022-03-17 DIAGNOSIS — C34.12 CANCER OF UPPER LOBE OF LEFT LUNG: Primary | ICD-10-CM

## 2022-03-17 DIAGNOSIS — D84.821 IMMUNODEFICIENCY DUE TO DRUGS: ICD-10-CM

## 2022-03-17 DIAGNOSIS — Z79.899 IMMUNODEFICIENCY DUE TO DRUGS: ICD-10-CM

## 2022-03-17 DIAGNOSIS — C78.1 SECONDARY MALIGNANCY OF ANTERIOR MEDIASTINUM: ICD-10-CM

## 2022-03-17 DIAGNOSIS — C34.12 MALIGNANT NEOPLASM OF UPPER LOBE OF LEFT LUNG: Primary | ICD-10-CM

## 2022-03-17 DIAGNOSIS — G89.3 NEOPLASM RELATED PAIN: ICD-10-CM

## 2022-03-17 PROCEDURE — 63600175 PHARM REV CODE 636 W HCPCS: Mod: PN | Performed by: INTERNAL MEDICINE

## 2022-03-17 PROCEDURE — 99215 OFFICE O/P EST HI 40 MIN: CPT | Mod: S$PBB,,, | Performed by: INTERNAL MEDICINE

## 2022-03-17 PROCEDURE — 99999 PR PBB SHADOW E&M-EST. PATIENT-LVL IV: ICD-10-PCS | Mod: PBBFAC,,, | Performed by: INTERNAL MEDICINE

## 2022-03-17 PROCEDURE — 96413 CHEMO IV INFUSION 1 HR: CPT | Mod: PN

## 2022-03-17 PROCEDURE — 96417 CHEMO IV INFUS EACH ADDL SEQ: CPT | Mod: PN

## 2022-03-17 PROCEDURE — 77014 HC CT GUIDANCE RADIATION THERAPY FLDS PLACEMENT: CPT | Mod: TC,PN | Performed by: STUDENT IN AN ORGANIZED HEALTH CARE EDUCATION/TRAINING PROGRAM

## 2022-03-17 PROCEDURE — 25000003 PHARM REV CODE 250: Mod: PN | Performed by: INTERNAL MEDICINE

## 2022-03-17 PROCEDURE — 99214 OFFICE O/P EST MOD 30 MIN: CPT | Mod: PBBFAC,25,PN | Performed by: INTERNAL MEDICINE

## 2022-03-17 PROCEDURE — 77014 PR  CT GUIDANCE PLACEMENT RAD THERAPY FIELDS: CPT | Mod: 26,,, | Performed by: STUDENT IN AN ORGANIZED HEALTH CARE EDUCATION/TRAINING PROGRAM

## 2022-03-17 PROCEDURE — 96375 TX/PRO/DX INJ NEW DRUG ADDON: CPT | Mod: PN

## 2022-03-17 PROCEDURE — 99215 PR OFFICE/OUTPT VISIT, EST, LEVL V, 40-54 MIN: ICD-10-PCS | Mod: S$PBB,,, | Performed by: INTERNAL MEDICINE

## 2022-03-17 PROCEDURE — 96367 TX/PROPH/DG ADDL SEQ IV INF: CPT | Mod: PN

## 2022-03-17 PROCEDURE — 77386 HC IMRT, COMPLEX: CPT | Mod: PN | Performed by: STUDENT IN AN ORGANIZED HEALTH CARE EDUCATION/TRAINING PROGRAM

## 2022-03-17 PROCEDURE — 77014 PR  CT GUIDANCE PLACEMENT RAD THERAPY FIELDS: ICD-10-PCS | Mod: 26,,, | Performed by: STUDENT IN AN ORGANIZED HEALTH CARE EDUCATION/TRAINING PROGRAM

## 2022-03-17 PROCEDURE — 99999 PR PBB SHADOW E&M-EST. PATIENT-LVL IV: CPT | Mod: PBBFAC,,, | Performed by: INTERNAL MEDICINE

## 2022-03-17 RX ORDER — HEPARIN 100 UNIT/ML
500 SYRINGE INTRAVENOUS
Status: DISCONTINUED | OUTPATIENT
Start: 2022-03-17 | End: 2022-03-17 | Stop reason: HOSPADM

## 2022-03-17 RX ORDER — DIPHENHYDRAMINE HCL 50 MG
50 CAPSULE ORAL
Status: COMPLETED | OUTPATIENT
Start: 2022-03-17 | End: 2022-03-17

## 2022-03-17 RX ORDER — FAMOTIDINE 10 MG/ML
20 INJECTION INTRAVENOUS
Status: CANCELLED | OUTPATIENT
Start: 2022-03-17

## 2022-03-17 RX ORDER — SODIUM CHLORIDE 0.9 % (FLUSH) 0.9 %
10 SYRINGE (ML) INJECTION
Status: DISCONTINUED | OUTPATIENT
Start: 2022-03-17 | End: 2022-03-17 | Stop reason: HOSPADM

## 2022-03-17 RX ORDER — OXYCODONE HYDROCHLORIDE 5 MG/1
5 TABLET ORAL EVERY 12 HOURS PRN
Qty: 14 TABLET | Refills: 0 | Status: SHIPPED | OUTPATIENT
Start: 2022-03-17 | End: 2022-04-20

## 2022-03-17 RX ORDER — DIPHENHYDRAMINE HYDROCHLORIDE 50 MG/ML
50 INJECTION INTRAMUSCULAR; INTRAVENOUS ONCE AS NEEDED
Status: DISCONTINUED | OUTPATIENT
Start: 2022-03-17 | End: 2022-03-17 | Stop reason: HOSPADM

## 2022-03-17 RX ORDER — FAMOTIDINE 10 MG/ML
20 INJECTION INTRAVENOUS
Status: COMPLETED | OUTPATIENT
Start: 2022-03-17 | End: 2022-03-17

## 2022-03-17 RX ORDER — EPINEPHRINE 0.3 MG/.3ML
0.3 INJECTION SUBCUTANEOUS ONCE AS NEEDED
Status: DISCONTINUED | OUTPATIENT
Start: 2022-03-17 | End: 2022-03-17 | Stop reason: HOSPADM

## 2022-03-17 RX ORDER — SODIUM CHLORIDE 0.9 % (FLUSH) 0.9 %
10 SYRINGE (ML) INJECTION
Status: CANCELLED | OUTPATIENT
Start: 2022-03-17

## 2022-03-17 RX ORDER — EPINEPHRINE 0.3 MG/.3ML
0.3 INJECTION SUBCUTANEOUS ONCE AS NEEDED
Status: CANCELLED | OUTPATIENT
Start: 2022-03-17

## 2022-03-17 RX ORDER — HEPARIN 100 UNIT/ML
500 SYRINGE INTRAVENOUS
Status: CANCELLED | OUTPATIENT
Start: 2022-03-17

## 2022-03-17 RX ORDER — NALOXONE HYDROCHLORIDE 4 MG/.1ML
SPRAY NASAL
Qty: 1 EACH | Refills: 11 | Status: SHIPPED | OUTPATIENT
Start: 2022-03-17

## 2022-03-17 RX ORDER — DIPHENHYDRAMINE HYDROCHLORIDE 50 MG/ML
50 INJECTION INTRAMUSCULAR; INTRAVENOUS ONCE AS NEEDED
Status: CANCELLED | OUTPATIENT
Start: 2022-03-17

## 2022-03-17 RX ADMIN — SODIUM CHLORIDE: 9 INJECTION, SOLUTION INTRAVENOUS at 10:03

## 2022-03-17 RX ADMIN — FAMOTIDINE 20 MG: 10 INJECTION INTRAVENOUS at 10:03

## 2022-03-17 RX ADMIN — CARBOPLATIN 135 MG: 10 INJECTION, SOLUTION INTRAVENOUS at 12:03

## 2022-03-17 RX ADMIN — Medication 500 UNITS: at 01:03

## 2022-03-17 RX ADMIN — DIPHENHYDRAMINE HYDROCHLORIDE 50 MG: 50 CAPSULE ORAL at 10:03

## 2022-03-17 RX ADMIN — PACLITAXEL 78 MG: 6 INJECTION, SOLUTION, CONCENTRATE INTRAVENOUS at 10:03

## 2022-03-17 RX ADMIN — PALONOSETRON: 0.05 INJECTION, SOLUTION INTRAVENOUS at 10:03

## 2022-03-17 NOTE — PROGRESS NOTES
"Subjective:       Patient ID: Teresa Earl is a 71 y.o. female.    Chief Complaint: Cancer of upper lobe of left lung  Mrs. Earl is a 71 year old female who underwent a chest xray in September 2022 prior to a discectomy and that showed a lung mass.      She then underwent CT chest also on September 2022 which showed spiculated mass in anterior segment GRACIELA, multiple borderline/enlarged mediastinal lymph nodes.     PFT's on 11/5/21 revealed FVC 1.96 (61%), FEV1 1.47 (60%), TLC 73%, DLCO 9.18 (47%)     PET/CT on 11/11/2022 reveals  FDG avid, spiculated pleural based mass in the GRACIELA extending into the anterior mediastinum, measuring 3.5cm. SUV 8.5. no enlarged or FDG avid lymphadenopathy in chest. No distant mets but FDG avid L parotid lesion     Navigational bronch on 12/13/2021 revealed "no endobronchial disease medial GRACIELA sampled. EBUS for station 4L, 7, 11. Path: GRACIELA suspicious but not diagnostic for malignancy. 4L, 7, 11L negative for malignancy"     On 1/25/22: he met with CT surgery and is felt not to be a candidate for resection due to invasion of mediastinum.     MRI brain on 2/12/2022 revealed  no intracranial mets     CT Guided biopsy on 2/24/2022 reveals  of GRACIELA demonstrating squamous cell carcinoma. Left parotid gland demonstrating pleomorphic adenomal     Repeat CT scans on 2/28/2022 revealed "56 x 39 x 15 mm spiculated pleural-based left upper lobe mass (compatible with biopsy proven diagnosis of squamous cell carcinoma) which abuts and invades the anterior mediastinum with compression and/or invasion of the brachial cephalic vein and intimate association with the left internal mammary vessels and anterior margin of the thoracic aortic arch. Scattered solid pulmonary nodules measuring up to 3 mm in size for which short-term imaging follow-up is recommended. Centrilobular emphysematous lung architecture.  21 x 22 mm hypodense area within the medial segment of the left hepatic lobe abutting the interlobar " "fissure.  While it is possible that this represents focal fatty infiltration, an underlying solitary liver lesion or focus of metastatic disease cannot be excluded.  The patient's recent PET-CT scan showed no abnormal FDG hypermetabolism in this vicinity.  Consider additional investigation with contrast enhanced MRI of the liver. Stable 26 x 39 mm left adnexal hypodensity which show no FDG hypermetabolism at the time of the patient's recent PET-CT scan. Tiny fat containing umbilical hernia. 12 mm short axis right paratracheal lymph node which showed no FDG hypermetabolism at the time of the patient's recent PET-CT scan and which remains unchanged in size since that study"  Follow-up MRI abdomen on 3/3/2022 reveals "Hypoattenuating area on prior CT near the falciform ligament corresponds to an area of focal fatty infiltration.  No suspicious liver lesions are present"    Bola is on definitive chemo/RT with weekly Carboplatin and Taxol, week 2 today  She notes she did well with her chemo but notes more pain and has stopped the suboxone X 1 week ago (which was being prescribed by Dr. Betancourt) 1 week ago.   She notes nausea and diarrhea with chemo   Denies mouth sores. She does note some anterior chest pain and she underwent CT Angiogram on 3/15/2022 which reveals "No evidence of pulmonary embolism. Interval growth of the squamous cell carcinoma in the anteromedial aspect of the left upper lobe involving the anterior mediastinum and encasing/obstructing the brachiocephalic vein.  Interval increase in the size of the wedge-shaped peripheral consolidation in the posterior left lower lobe highly suggestive of pneumonitis"  Denies fever. Dizziness  is present and is chronic.    Review of Systems   Constitutional: Positive for fatigue. Negative for appetite change and unexpected weight change.   HENT: Negative for mouth sores.    Eyes: Negative for visual disturbance.   Respiratory: Negative for cough and shortness of " breath.    Cardiovascular: Negative for chest pain.   Gastrointestinal: Positive for diarrhea and nausea. Negative for abdominal pain.   Genitourinary: Negative for frequency.   Musculoskeletal: Negative for back pain.   Integumentary:  Negative for rash.   Neurological: Negative for headaches.   Hematological: Negative for adenopathy.   Psychiatric/Behavioral: The patient is not nervous/anxious.    All other systems reviewed and are negative.        Objective:      Physical Exam  Vitals reviewed.   Constitutional:       Appearance: She is well-developed.   HENT:      Mouth/Throat:      Pharynx: No oropharyngeal exudate.   Cardiovascular:      Rate and Rhythm: Normal rate.      Heart sounds: Normal heart sounds.   Pulmonary:      Effort: Pulmonary effort is normal.      Breath sounds: Normal breath sounds. No wheezing.   Abdominal:      General: Bowel sounds are normal.      Palpations: Abdomen is soft.      Tenderness: There is no abdominal tenderness.   Musculoskeletal:         General: No tenderness.   Lymphadenopathy:      Cervical: No cervical adenopathy.   Skin:     General: Skin is warm and dry.      Findings: No rash.   Neurological:      Mental Status: She is alert and oriented to person, place, and time.      Coordination: Coordination normal.   Psychiatric:         Thought Content: Thought content normal.         Judgment: Judgment normal.           LABS:  WBC   Date Value Ref Range Status   03/17/2022 14.43 (H) 3.90 - 12.70 K/uL Final     Hemoglobin   Date Value Ref Range Status   03/17/2022 11.9 (L) 12.0 - 16.0 g/dL Final     Hematocrit   Date Value Ref Range Status   03/17/2022 38.8 37.0 - 48.5 % Final     Platelets   Date Value Ref Range Status   03/17/2022 332 150 - 450 K/uL Final     Gran # (ANC)   Date Value Ref Range Status   03/17/2022 12.2 (H) 1.8 - 7.7 K/uL Final     Comment:     The ANC is based on a white cell differential from an   automated cell counter. It has not been microscopically    reviewed for the presence of abnormal cells. Clinical   correlation is required.         Chemistry        Component Value Date/Time     2022 08    K 4.8 2022    CL 99 2022    CO2 24 2022    BUN 18 2022 08    CREATININE 1.2 2022 08     (H) 2022        Component Value Date/Time    CALCIUM 9.9 2022    ALKPHOS 109 2022    AST 12 2022 08    ALT 8 (L) 2022    BILITOT 0.4 2022    ESTGFRAFRICA 53 (A) 2022    EGFRNONAA 46 (A) 2022        Ma.8    Assessment:       Problem List Items Addressed This Visit     Cancer of upper lobe of left lung - Primary    Immunodeficiency due to drugs    Secondary malignancy of anterior mediastinum          Plan:         Very complicated pain issues, has had chronic pain and been on suboxone, followed by Dr. Betancourt, she now notes chest pain from her lung cancer. Reviewed with Dr. Betancourt (called him) and he noted she can continue with suboxone, called patient and she notes that she cancelled appointment with Dr. Betancourt due to the high copay ($200)and she only has 2 tablets left.  Spoke with Dr. Sneha Shah, reviewed that patient notes pain in her chest where tumor is located, we agreed to help her with pain meds and get her established with palliative care.  She understands that Oncology and palliative care can only provide treatment for her cancer pain, once treatment is completed, she will have to return to her pain doctors for pain management.   She will proceed with weekly Carboplatin and Taxol with concurrent RT and will return in 1 week for next treatment    Above care plan was discussed with patient and accompanying daughter and all questions were addressed to their satisfaction

## 2022-03-17 NOTE — Clinical Note
Dani Casanova, Can you please see her. She notes pain in her chest where her cancer is located. See my note, I spoke with Dr. Betancourt who has been giving her suboxone, he said she can continue that, but she does not want to see him right now as copay is 200$ in his office. I told her I will not be prescribing suboxone for her. I spoke with Dr. Shah and we agreed to send some Oxycodone (14 tablets) until she can see you guys. She knows that once cancer treatment is completed she will need to return back to her pain doctor. She understands that both palliative care and oncology will not treat chronic pain.

## 2022-03-17 NOTE — Clinical Note
Dani Gonzales and Brayden, So I have this complicated patient who has been on suboxone until a week ago, recently was diagnosed with lung cancer and is on chemo/RT, week 2 this week, she has tumor in the left upper lobe which may be exacerbating her chronic pain, because of her history of suboxone use, palliative care up here ([facility]) is hesitant to see her.  I see that she is scheduled with you alice for 4/20/2022. She obviously has issues with pain management and with history of suboxone, I am struggling with what I can/should give her until she sees you alice. Any recs will be appreciated or if you alice can see her sooner that will be great as well

## 2022-03-17 NOTE — PROGRESS NOTES
ONCOLOGY NUTRITION   FOLLOW UP VISIT        Teresa Earl is a 71 y.o. female.  DATE: 03/17/2022        Oncology Diagnosis: Lung Cancer     REFERRAL FROM:   [] Integrative Oncology   [] Med/Heme Oncology  [] Radiation Oncology  [] Surgical Oncology   [] Infusion Nurse    [x] Routine Nutrition follow up    TREATMENT PLAN:   [] Full treatment plan pending  [x] Chemotherapy  [] Immunotherapy  [] Radiation  [] Concurrent  [] Surgery  [] Treatment complete/post-treatment    ANTHROPOMETRICS:  Wt Readings from Last 10 Encounters:   03/17/22 64.3 kg (141 lb 12.1 oz)   03/10/22 66.1 kg (145 lb 11.6 oz)   03/09/22 66.2 kg (146 lb)   02/24/22 66.3 kg (146 lb 2.6 oz)   02/23/22 66.4 kg (146 lb 6.2 oz)   02/17/22 68.4 kg (150 lb 12.7 oz)   02/17/22 68.6 kg (151 lb 3.8 oz)   02/14/22 67.6 kg (149 lb)   02/07/22 68.9 kg (152 lb)   01/25/22 67.4 kg (148 lb 9.4 oz)      Weight Changes: has decreased 4 pounds over last one week    PHYSICAL EXAM:  Muscle Wasting Observed:  [] No Deficit   [x] Mild Deficit   [] Moderate   [] Severe    INTAKE:  [x] PO Intake [] TF Intake  Current Diet: regular diet  Dietary Patterns:  Eating meals/snacks as tolerated  [x] Oral nutritional supplements: One Glucerna per day    SYMPTOMS/COMPLAINTS:  [] No nutritional concerns at current  [] Diarrhea                    [] Constipation           [] Nausea                 [] Vomiting                [] Indigestion                [] Reflux              [x] Poor Appetite            [] Anorexia                 [] Early Satiety         [] Gas                       [] Bloating                     [] Dry Mouth    [] Mucositis                   [] Mouth Sores           [] Poor Dentition      [] Difficulty chewing  [] Difficulty Swallowing   [] Pain with swallowing [] Change in taste      [] Change in smell   [] Pain (general)       [] Fatigue                      [] Sleep issues    [x] Weight loss  [] other, please specify    Nutrition Re-Assessment Risk:  Mild/Moderate    [x] Labs reviewed   [x] Meds reviewed    Education Provided:   [] No Education Needed at this time  [] Diarrhea                                              [] Constipation                          [] Nausea/Vomiting  [] Mucositis                [] Dry Mouth    [] Dealing with changes in Taste/Smell  [x] Dealing with Poor Appetite   [] Soft/moist Diet      [x] Weight Loss/Gain     [] Weight Maintenance                           [] Indigestion/GERD                 [] Gas/Bloating          [] Foods High/ Low in specific nutrients [] Increasing Calories/Protein   [] Milkshake/Smoothies Recipes   [x] Nutrition Supplements                        [] Increasing Fluid Intakes         [] Foods that fight cancer    [] Evidence bases resources                 [] Fermented Foods/Probiotics  [] Mediterranean/Plant Based Diet     [] Other, specify                                   [] Handouts provided      [x] Samples provided: Orgain Protein Powder, Iconic Protein Powder    RD NOTE:  RD met with patient at chairside during infusion treatment, along with patient's daughter Li. Pt has continued to lose weight. Pt states she has tried to eat more often throughout the day but is struggling with poor appetite. Pt consumes one Glucerna per day but she has to force it and does not think she could drink more. RD suggested patient start making her own shakes and ways to increase calories. Provided samples of protein powder.   JANIS had Li call [facility] during infusion today to get patient enrolled in TFP.     RD Goals:   [] Weight stable                  [x] Weight gain                      [] Weight Loss                               [] Continue adequate Kcal/protein   [x] Increase Kcal/protein      [] Adjust Tube-feeding Rx   [] Tolerate Tube Feedings             [] Increase tube feedings to goal     [] Tolerate Supplements     [] Symptom Improvement   [] Understand nutrition Education  [] Offer supportive visits    [] other, please specify    RECOMMENDATIONS:  1. Glucerna daily  2. Home made protein shake daily  3. Try to eat every 2-3 hours    Follow up: Next infusion or PRN throughout tx    Jessica Gentile MS, RD, LDN  03/17/2022  2:22 PM

## 2022-03-18 ENCOUNTER — TELEPHONE (OUTPATIENT)
Dept: HEMATOLOGY/ONCOLOGY | Facility: CLINIC | Age: 72
End: 2022-03-18
Payer: MEDICARE

## 2022-03-18 PROCEDURE — 77386 HC IMRT, COMPLEX: CPT | Mod: PN | Performed by: STUDENT IN AN ORGANIZED HEALTH CARE EDUCATION/TRAINING PROGRAM

## 2022-03-18 PROCEDURE — 77014 PR  CT GUIDANCE PLACEMENT RAD THERAPY FIELDS: ICD-10-PCS | Mod: 26,,, | Performed by: STUDENT IN AN ORGANIZED HEALTH CARE EDUCATION/TRAINING PROGRAM

## 2022-03-18 PROCEDURE — 77014 HC CT GUIDANCE RADIATION THERAPY FLDS PLACEMENT: CPT | Mod: TC,PN | Performed by: STUDENT IN AN ORGANIZED HEALTH CARE EDUCATION/TRAINING PROGRAM

## 2022-03-18 PROCEDURE — 77014 PR  CT GUIDANCE PLACEMENT RAD THERAPY FIELDS: CPT | Mod: 26,,, | Performed by: STUDENT IN AN ORGANIZED HEALTH CARE EDUCATION/TRAINING PROGRAM

## 2022-03-18 NOTE — TELEPHONE ENCOUNTER
----- Message from Rhonda Ontiveros RN sent at 3/18/2022 12:25 PM CDT -----  Dr. Shah has an opening 3/24 at 0900am. Does this work?   Rhonda  ----- Message -----  From: Mere Styles MD  Sent: 3/18/2022  10:54 AM CDT  To: Noreen Cedeno, RN, Rhonda Ontiveros, RN, #    I will move her chemo to another day and she can get RT first thing in the morning and come there. This appointment is important for her   What time can you see her. I will call her and tell her to keep that appointment   I am attaching Leonarda my nurse and rad onc as well    Inna can you forward this to [facility] in [place]    ----- Message -----  From: Sneha Shah MD  Sent: 3/18/2022  10:15 AM CDT  To: Mere Styles MD, Rhonda Ontiveros RN, #    FYI  ----- Message -----  From: Rhonda Ontiveros RN  Sent: 3/18/2022  10:07 AM CDT  To: Sneha Shah MD    I called her. She can't come on Thursdays because she gets chemo and radiation.  Rhonda  ----- Message -----  From: Sneha Shah MD  Sent: 3/18/2022   9:56 AM CDT  To: Mere Styles MD, Rhonda Ontiveros, RN, #    I suspect 14 oxy tabs wont hold her til 4/20.  I can see her on 3/24 at 9. Has to be in person.   Sneha    ----- Message -----  From: Mere Styles MD  Sent: 3/18/2022   9:41 AM CDT  To: Sneha Shah MD, Jocelyne Crabtree NP, #    OK then we will let her see Dr. Angel as scheduled  ----- Message -----  From: Jocelyne Crabtree NP  Sent: 3/18/2022   8:00 AM CDT  To: Mere Styles MD    I don't feel comfortable treating her pain given her history of opioid abuse and chronic pain even if she has cancer related pain now.  ----- Message -----  From: Mere Styles MD  Sent: 3/17/2022   4:33 PM CDT  To: Sneha Shah MD, Jocelyne Crabtree NP    She notes that she has cancer pain in the chest where the tumor is located... Would that make a difference  ----- Message -----  From: Jocelyne Crabtree NP  Sent: 3/17/2022   4:27 PM CDT  To: Mere Styles MD    Hi Dr. Styles,    It looks like  she has an appt with Dr. Angel on the [facility]. I spoke with my supervisor about this patient, and I cannot see her as I have said before because her chronic pain. [place] does not allow Nps to treat chronic pain. If she needs to see an MD here we can set her up if it doesn't work out with Dr. Angel. Our clinic is separate from Dr. Shah and Dr. Angel's palliative care group.    If you have any questions, don't hesitate to call. [phone]    Thank you,  Jocelyne      ----- Message -----  From: Mere Styles MD  Sent: 3/17/2022   3:26 PM CDT  To: Sneha Shah MD, SRINATH James,  Can you please see her. She notes pain in her chest where her cancer is located. See my note, I spoke with Dr. Betancourt who has been giving her suboxone, he said she can continue that, but she does not want to see him right now as copay is 200$ in his office. I told her I will not be prescribing suboxone for her. I spoke with Dr. Shah and we agreed to send some Oxycodone (14 tablets) until she can see you guys. She knows that once cancer treatment is completed she will need to return back to her pain doctor. She understands that both palliative care and oncology will not treat chronic pain.

## 2022-03-18 NOTE — TELEPHONE ENCOUNTER
Dani Ellis,  I spoke with the patient, she will come at 9 AM on Thursday 3/24 to see Dr. Shah    I will move the labs and chemo to 3/23 or 3/25 (attaching my nurse on this message )    Also attaching Radiation on this message to see if patient can get radiation first thing in the morning and come to see Dr. Shah for 9 AM or come back after Dr. Shah's appointment for radiation

## 2022-03-21 PROCEDURE — 77014 PR  CT GUIDANCE PLACEMENT RAD THERAPY FIELDS: CPT | Mod: 26,,, | Performed by: STUDENT IN AN ORGANIZED HEALTH CARE EDUCATION/TRAINING PROGRAM

## 2022-03-21 PROCEDURE — 77014 HC CT GUIDANCE RADIATION THERAPY FLDS PLACEMENT: CPT | Mod: TC,PN | Performed by: STUDENT IN AN ORGANIZED HEALTH CARE EDUCATION/TRAINING PROGRAM

## 2022-03-21 PROCEDURE — 77386 HC IMRT, COMPLEX: CPT | Mod: PN | Performed by: STUDENT IN AN ORGANIZED HEALTH CARE EDUCATION/TRAINING PROGRAM

## 2022-03-21 PROCEDURE — 77014 PR  CT GUIDANCE PLACEMENT RAD THERAPY FIELDS: ICD-10-PCS | Mod: 26,,, | Performed by: STUDENT IN AN ORGANIZED HEALTH CARE EDUCATION/TRAINING PROGRAM

## 2022-03-22 ENCOUNTER — DOCUMENTATION ONLY (OUTPATIENT)
Dept: RADIATION ONCOLOGY | Facility: CLINIC | Age: 72
End: 2022-03-22
Payer: MEDICARE

## 2022-03-22 PROCEDURE — 77014 HC CT GUIDANCE RADIATION THERAPY FLDS PLACEMENT: CPT | Mod: TC,PN | Performed by: STUDENT IN AN ORGANIZED HEALTH CARE EDUCATION/TRAINING PROGRAM

## 2022-03-22 PROCEDURE — 77386 HC IMRT, COMPLEX: CPT | Mod: PN | Performed by: STUDENT IN AN ORGANIZED HEALTH CARE EDUCATION/TRAINING PROGRAM

## 2022-03-22 PROCEDURE — 77014 PR  CT GUIDANCE PLACEMENT RAD THERAPY FIELDS: ICD-10-PCS | Mod: 26,,, | Performed by: STUDENT IN AN ORGANIZED HEALTH CARE EDUCATION/TRAINING PROGRAM

## 2022-03-22 PROCEDURE — 77014 PR  CT GUIDANCE PLACEMENT RAD THERAPY FIELDS: CPT | Mod: 26,,, | Performed by: STUDENT IN AN ORGANIZED HEALTH CARE EDUCATION/TRAINING PROGRAM

## 2022-03-22 NOTE — PLAN OF CARE
Completed 9 of 30 outpatient radiation treatments this visit.  Complains of fatigue.  Patient has lost 2kg in past week.  All questions and concerns addressed by MD.

## 2022-03-23 ENCOUNTER — TELEPHONE (OUTPATIENT)
Dept: PALLIATIVE MEDICINE | Facility: CLINIC | Age: 72
End: 2022-03-23
Payer: MEDICARE

## 2022-03-23 PROCEDURE — 77014 PR  CT GUIDANCE PLACEMENT RAD THERAPY FIELDS: ICD-10-PCS | Mod: 26,,, | Performed by: STUDENT IN AN ORGANIZED HEALTH CARE EDUCATION/TRAINING PROGRAM

## 2022-03-23 PROCEDURE — 77014 PR  CT GUIDANCE PLACEMENT RAD THERAPY FIELDS: CPT | Mod: 26,,, | Performed by: STUDENT IN AN ORGANIZED HEALTH CARE EDUCATION/TRAINING PROGRAM

## 2022-03-23 PROCEDURE — 77386 HC IMRT, COMPLEX: CPT | Mod: PN | Performed by: STUDENT IN AN ORGANIZED HEALTH CARE EDUCATION/TRAINING PROGRAM

## 2022-03-23 PROCEDURE — 77014 HC CT GUIDANCE RADIATION THERAPY FLDS PLACEMENT: CPT | Mod: TC,PN | Performed by: STUDENT IN AN ORGANIZED HEALTH CARE EDUCATION/TRAINING PROGRAM

## 2022-03-24 ENCOUNTER — OFFICE VISIT (OUTPATIENT)
Dept: PALLIATIVE MEDICINE | Facility: CLINIC | Age: 72
End: 2022-03-24
Payer: MEDICARE

## 2022-03-24 DIAGNOSIS — C78.1 SECONDARY MALIGNANCY OF ANTERIOR MEDIASTINUM: ICD-10-CM

## 2022-03-24 DIAGNOSIS — Z51.5 PALLIATIVE CARE BY SPECIALIST: ICD-10-CM

## 2022-03-24 DIAGNOSIS — R11.2 NON-INTRACTABLE VOMITING WITH NAUSEA, UNSPECIFIED VOMITING TYPE: ICD-10-CM

## 2022-03-24 DIAGNOSIS — M54.12 CERVICAL RADICULOPATHY: ICD-10-CM

## 2022-03-24 DIAGNOSIS — G89.3 CANCER ASSOCIATED PAIN: ICD-10-CM

## 2022-03-24 DIAGNOSIS — E11.9 TYPE 2 DIABETES MELLITUS WITHOUT COMPLICATION, WITHOUT LONG-TERM CURRENT USE OF INSULIN: ICD-10-CM

## 2022-03-24 DIAGNOSIS — C34.12 CANCER OF UPPER LOBE OF LEFT LUNG: Primary | ICD-10-CM

## 2022-03-24 PROCEDURE — 99211 OFF/OP EST MAY X REQ PHY/QHP: CPT | Mod: PBBFAC,25 | Performed by: INTERNAL MEDICINE

## 2022-03-24 PROCEDURE — 99417 PR PROLONGED SVC, OUTPT, W/WO DIRECT PT CONTACT,  EA ADDTL 15 MIN: ICD-10-PCS | Mod: S$PBB,,, | Performed by: INTERNAL MEDICINE

## 2022-03-24 PROCEDURE — 99205 OFFICE O/P NEW HI 60 MIN: CPT | Mod: S$PBB,,, | Performed by: INTERNAL MEDICINE

## 2022-03-24 PROCEDURE — 77014 HC CT GUIDANCE RADIATION THERAPY FLDS PLACEMENT: CPT | Mod: TC,PN | Performed by: STUDENT IN AN ORGANIZED HEALTH CARE EDUCATION/TRAINING PROGRAM

## 2022-03-24 PROCEDURE — 99417 PROLNG OP E/M EACH 15 MIN: CPT | Mod: S$PBB,,, | Performed by: INTERNAL MEDICINE

## 2022-03-24 PROCEDURE — 99999 PR PBB SHADOW E&M-EST. PATIENT-LVL I: CPT | Mod: PBBFAC,,, | Performed by: INTERNAL MEDICINE

## 2022-03-24 PROCEDURE — 77336 RADIATION PHYSICS CONSULT: CPT | Mod: PN | Performed by: STUDENT IN AN ORGANIZED HEALTH CARE EDUCATION/TRAINING PROGRAM

## 2022-03-24 PROCEDURE — 99205 PR OFFICE/OUTPT VISIT, NEW, LEVL V, 60-74 MIN: ICD-10-PCS | Mod: S$PBB,,, | Performed by: INTERNAL MEDICINE

## 2022-03-24 PROCEDURE — 77386 HC IMRT, COMPLEX: CPT | Mod: PN | Performed by: STUDENT IN AN ORGANIZED HEALTH CARE EDUCATION/TRAINING PROGRAM

## 2022-03-24 PROCEDURE — 99999 PR PBB SHADOW E&M-EST. PATIENT-LVL I: ICD-10-PCS | Mod: PBBFAC,,, | Performed by: INTERNAL MEDICINE

## 2022-03-24 PROCEDURE — 77014 PR  CT GUIDANCE PLACEMENT RAD THERAPY FIELDS: CPT | Mod: 26,,, | Performed by: STUDENT IN AN ORGANIZED HEALTH CARE EDUCATION/TRAINING PROGRAM

## 2022-03-24 PROCEDURE — 77014 PR  CT GUIDANCE PLACEMENT RAD THERAPY FIELDS: ICD-10-PCS | Mod: 26,,, | Performed by: STUDENT IN AN ORGANIZED HEALTH CARE EDUCATION/TRAINING PROGRAM

## 2022-03-24 RX ORDER — OXYCODONE HYDROCHLORIDE 10 MG/1
10 TABLET ORAL EVERY 4 HOURS PRN
Qty: 100 TABLET | Refills: 0 | Status: SHIPPED | OUTPATIENT
Start: 2022-03-24 | End: 2022-04-08 | Stop reason: SDUPTHER

## 2022-03-24 RX ORDER — PREDNISONE 5 MG/1
5 TABLET ORAL DAILY
Qty: 30 TABLET | Refills: 0 | Status: SHIPPED | OUTPATIENT
Start: 2022-03-31 | End: 2023-01-01

## 2022-03-24 RX ORDER — PREDNISONE 20 MG/1
TABLET ORAL
Qty: 6 TABLET | Refills: 0 | Status: SHIPPED | OUTPATIENT
Start: 2022-03-24 | End: 2022-03-31

## 2022-03-24 NOTE — PROGRESS NOTES
Consult Note  Palliative Care      Consult Requested By: Dr. Mere Styles  Reason for Consult: Pain in chest and neck. Management of symptoms      ASSESSMENT/PLAN:     Teresa was seen today for neck pain and chest pain.    Diagnoses and all orders for this visit:    Cancer of upper lobe of left lung  -     oxyCODONE (ROXICODONE) 10 mg Tab immediate release tablet; Take 1 tablet (10 mg total) by mouth every 4 (four) hours as needed for Pain.  She has naloxone nose spray at home. She was asked to keep a diary. Does it ever relieve the pain and for how long.  We discussed the use of long acting opioids as well as methadone. The rationale for the short and long acting pain medicine was discussed with patient and her daughter Chela. She has been frightened of methadone; we discussed this in detail. It is it's own long acting medicine so taking to much can lead to too much of a dose.  Secondary malignancy of anterior mediastinum  -     predniSONE (DELTASONE) 20 MG tablet; Take 1 tablet (20 mg total) by mouth once daily for 3 days, THEN 0.5 tablets (10 mg total) once daily for 4 days.  -     predniSONE (DELTASONE) 5 MG tablet; Take 1 tablet (5 mg total) by mouth once daily.  Currently receiving combination XRT and chemotherapy to be followed by immunotherapy.  Losing weight. Pain keeps her from sleeping.  Cervical radiculopathy    Cervical radiculopathy  -     oxyCODONE (ROXICODONE) 10 mg Tab immediate release tablet; Take 1 tablet (10 mg total) by mouth every 4 (four) hours as needed for Pain.  Patient is having some numbness and weakness in both arms and legs. She has had one fall. She is not interested in surgery now that she has lung cancer. She is careful when she gets up and tries to hold on to furniture or a family member to ambulate for security!  Cancer associated pain  -     oxyCODONE (ROXICODONE) 10 mg Tab immediate release tablet; Take 1 tablet (10 mg total) by mouth every 4 (four) hours as needed for  Pain.  -     predniSONE (DELTASONE) 20 MG tablet; Take 1 tablet (20 mg total) by mouth once daily for 3 days, THEN 0.5 tablets (10 mg total) once daily for 4 days.  -     predniSONE (DELTASONE) 5 MG tablet; Take 1 tablet (5 mg total) by mouth once daily.    Palliative care by specialist  -     oxyCODONE (ROXICODONE) 10 mg Tab immediate release tablet; Take 1 tablet (10 mg total) by mouth every 4 (four) hours as needed for Pain.  -     predniSONE (DELTASONE) 20 MG tablet; Take 1 tablet (20 mg total) by mouth once daily for 3 days, THEN 0.5 tablets (10 mg total) once daily for 4 days.  -     predniSONE (DELTASONE) 5 MG tablet; Take 1 tablet (5 mg total) by mouth once daily.  She will message me by Vennli the amount of medications that she has taken and how often. We will have a telehealth visit next week.    Non-intractable vomiting with nausea, unspecified vomiting type  Takes phenergan and zofran  Type 2 diabetes mellitus without complication, without long-term current use of insulin  She is currently on jardiance and metformin; her hgA1C has been about 7. She does not check her glucoses routinely. She is not having polyuria or nocturia at this time. I am more worried about hypoglycemia than hyperglycemia at this time but will need to monitor and adjust treatment as necessary.                    SUBJECTIVE:     History of Present Illness:  Patient is a 71 y.o. year old female presenting with lung cancer in left upper lobe and adjacent mediastinum. She is a current smoker. She has history of cervical spine pain who was found to have lung cancer during her preoperative evaluation for increasing neck pain and arm and leg weakness. She still has significant neck pain which is limiting her and also makes the XRT nearly intolerable to have her arms above her head. She has not wanted surgery in the past which was the reason that she was taking chronic opioids. She had been followed by a physician on the [place] for her Subutex  (she had an episode of rhabdomyolosis after being found down at home and this was labelled opioid overdose)  She has been taking oxycodone 5 mg - prescribed every 12 hours--it doesn't relieve her pain and does not last 12 hours. Her chest pain is described as gnawing and always present. It has a pleuritic component to it. She has lost 11 pounds since starting treatment. She does have elevated blood sugars which is multifactorial. Does not check her sugars at home.    Past Medical History:   Diagnosis Date    Acute kidney injury     Cervical radiculopathy     Cigarette smoker 1/26/2022    Diabetes mellitus     Hypertension     Mass of upper lobe of left lung 1/26/2022    Neuropathy     Rhabdomyolysis      Past Surgical History:   Procedure Laterality Date    BILATERAL TUBAL LIGATION      ENDOBRONCHIAL ULTRASOUND N/A 12/13/2021    Procedure: ENDOBRONCHIAL ULTRASOUN;  Surgeon: William Padgett Jr., DO;  Location: [facility];  Service: Pulmonary;  Laterality: N/A;    HYSTERECTOMY      INSERTION OF TUNNELED CENTRAL VENOUS CATHETER (CVC) WITH SUBCUTANEOUS PORT N/A 3/9/2022    Procedure: [id number];  Surgeon: Jos Solitario MD;  Location[facility] OR;  Service: General;  Laterality: N/A;    NAVIGATIONAL BRONCHOSCOPY Left 12/13/2021    Procedure: BRONCHOSCOPY, NAVIGATIONAL;  Surgeon: William Padgett Jr., DO;  Location: [facility];  Service: Pulmonary;  Laterality: Left;     Social History     Socioeconomic History    Marital status: Single   Tobacco Use    Smoking status: Current Every Day Smoker     Packs/day: 0.50     Types: Cigarettes    Smokeless tobacco: Never Used    Tobacco comment: Age Started: 15   Substance and Sexual Activity    Alcohol use: No     Family History   Problem Relation Age of Onset    Valvular heart disease Mother      Review of patient's allergies indicates:   Allergen Reactions    Codeine Hives and Other (See Comments)     Other reaction(s): Unknown  Other reaction(s):  Unknown      Ibuprofen Swelling    Morphine Diarrhea and Other (See Comments)     Other reaction(s): Renal Dysfunctions  Other reaction(s): Renal Dysfunctions         Medications:    Current Outpatient Medications:     albuterol (PROVENTIL/VENTOLIN HFA) 90 mcg/actuation inhaler, Inhale 2 puffs into the lungs every 6 (six) hours as needed for Wheezing or Shortness of Breath. Rescue, Disp: 1 g, Rfl: 5    BREO ELLIPTA 100-25 mcg/dose diskus inhaler, Inhale 2 puffs into the lungs once daily., Disp: , Rfl:     buprenorphine HCL (SUBUTEX) 8 mg Subl, Place 8 mg under the tongue once daily., Disp: , Rfl:     duke's soln (benadryl 30 mL, mylanta 30 mL, LIDOcaine 30 mL, nystatin 30 mL) 120mL, Take 10 mLs by mouth 4 (four) times daily., Disp: 120 mL, Rfl: 1    FLUoxetine 20 MG capsule, TAKE TWO CAPSULES BY MOUTH EVERY MORNING, Disp: 30 capsule, Rfl: 2    fluticasone furoate-vilanteroL (BREO ELLIPTA) 100-25 mcg/dose diskus inhaler, Inhale 1 puff into the lungs., Disp: , Rfl:     fluticasone furoate-vilanteroL (BREO ELLIPTA) 100-25 mcg/dose diskus inhaler, Inhale 1 puff into the lungs., Disp: , Rfl:     gabapentin (NEURONTIN) 800 MG tablet, Take 800 mg by mouth 3 (three) times daily., Disp: , Rfl:     HYDROcodone-acetaminophen (NORCO) 5-325 mg per tablet, Take 1 tablet by mouth every 6 (six) hours as needed for Pain., Disp: 20 tablet, Rfl: 0    JARDIANCE 25 mg tablet, Take 1 Tablet (25 mg) by mouth daily in the morning., Disp: , Rfl:     LIDOcaine (LIDODERM) 5 %, apply ONE PATCH ONCE DAILY FOR 12 hours THEN REMOVE, Disp: , Rfl:     LIDOcaine-prilocaine (EMLA) cream, Apply topically as needed (Prior to port access)., Disp: 30 g, Rfl: 0    lovastatin (MEVACOR) 10 MG tablet, Take 10 mg by mouth nightly., Disp: , Rfl:     metformin (GLUCOPHAGE) 1000 MG tablet, Take 1,000 mg by mouth 2 (two) times daily with meals., Disp: , Rfl:     naloxone (NARCAN) 4 mg/actuation Spry, 4mg by nasal route as needed for opioid  overdose; may repeat every 2-3 minutes in alternating nostrils until medical help arrives. Call 911, Disp: 1 each, Rfl: 11    ondansetron (ZOFRAN) 8 MG tablet, Take 1 tablet (8 mg total) by mouth daily as needed for Nausea., Disp: 40 tablet, Rfl: 1    oxyCODONE (ROXICODONE) 10 mg Tab immediate release tablet, Take 1 tablet (10 mg total) by mouth every 4 (four) hours as needed for Pain., Disp: 100 tablet, Rfl: 0    oxyCODONE (ROXICODONE) 5 MG immediate release tablet, Take 1 tablet (5 mg total) by mouth every 12 (twelve) hours as needed for Pain., Disp: 14 tablet, Rfl: 0    predniSONE (DELTASONE) 20 MG tablet, Take 1 tablet (20 mg total) by mouth once daily for 3 days, THEN 0.5 tablets (10 mg total) once daily for 4 days., Disp: 6 tablet, Rfl: 0    [START ON 3/31/2022] predniSONE (DELTASONE) 5 MG tablet, Take 1 tablet (5 mg total) by mouth once daily., Disp: 30 tablet, Rfl: 0    promethazine (PHENERGAN) 12.5 MG Tab, Take 1 tablet (12.5 mg total) by mouth every 6 (six) hours as needed (N/V)., Disp: 30 tablet, Rfl: 0    traZODone (DESYREL) 50 MG tablet, Take 50 mg by mouth., Disp: , Rfl:     results    Review of Symptoms    Symptom Assessment (ESAS 0-10 Scale)  Pain:  7  Dyspnea:  2  Anxiety:  5  Nausea:  0  Depression:  2  Anorexia:  2  Fatigue:  3  Insomnia:  3  Restlessness:  0  Agitation:  0     CAM / Delirium:  Negative  Constipation:  Negative  Diarrhea:  Positive    Bowel Management Plan (BMP):  Yes      Pain Assessment:  OME in 24 hours:  20 mg  Location(s): neck and chest    Neck       Location: bilateral        Quality: burning and pressure-like        Quantity: 7/10 in intensity        Chronicity: Onset 3 year(s) ago, gradually worsening since Was to have neck surgery and found to have lung cancer. Surgery indefinitely postponed.        Aggravating Factors: recumbency        Alleviating Factors: sitting up and opiates        NECK Associated Symptoms Choices: weakness.  Chest       Location: left  and anterior        Quality: pressure-like and throbbing        Quantity: 5/10 in intensity        Chronicity: Onset 3 week(s) ago, gradually worsening since Related to anterior mediastinal tumor.        Aggravating Factors: recumbency        Alleviating Factors: opiates and sitting up        CHEST Associated Symptoms Choices: shortness of breath and pleurisy.    Performance Status:  80    Living Arrangements:  Lives with family and Lives >50 miles from facility    Psychosocial/Cultural: Retired; lives in home with daughter, significant other and grandson/granddaughter.  Three children; Chela GARDNER and José Miguel Earl (son) live in [place], daughter Li in [place]. Patient's mother in [facility] in [place].      Spiritual:  F - Tammi and Belief:  None  I - Importance:  Unknown      Advance Care Planning   Advance Directives:   Living Will: No    LaPOST: No    Do Not Resuscitate Status: No    Medical Power of : Yes    Agent's Name:  Chela Anthony   Agent's Contact Number:  [phone]    Decision Making:  Patient answered questions and Family answered questions          OBJECTIVE:     ROS:  Review of Systems   Constitutional: Positive for activity change, appetite change, fatigue and unexpected weight change.   HENT: Negative.    Eyes: Negative.    Respiratory: Positive for cough, chest tightness and shortness of breath.    Cardiovascular: Negative.    Gastrointestinal: Positive for diarrhea.        Diarrhea several days ago; chemo related?   Endocrine: Negative.  Negative for polydipsia, polyphagia and polyuria.   Genitourinary: Negative.    Musculoskeletal: Positive for arthralgias, myalgias and neck pain.   Skin: Negative.         Easy bruising   Allergic/Immunologic: Negative.    Neurological: Positive for weakness.   Hematological: Negative.    Psychiatric/Behavioral: Negative.        Physical Exam:  Vitals:    Physical Exam  Vitals and nursing note reviewed.   HENT:      Head: Normocephalic and  atraumatic.      Right Ear: External ear normal.      Left Ear: External ear normal.      Nose: Nose normal.      Mouth/Throat:      Mouth: Mucous membranes are dry.   Eyes:      Conjunctiva/sclera: Conjunctivae normal.      Pupils: Pupils are equal, round, and reactive to light.   Cardiovascular:      Rate and Rhythm: Normal rate and regular rhythm.      Pulses: Normal pulses.      Heart sounds: Normal heart sounds.   Pulmonary:      Effort: Pulmonary effort is normal.      Breath sounds: Normal breath sounds.      Comments: Decreased breath sounds on the left. No wheezes/rhonchi.  Abdominal:      General: Bowel sounds are normal.   Musculoskeletal:         General: Normal range of motion.      Cervical back: Normal range of motion and neck supple. Tenderness present.   Skin:     Findings: Bruising present.   Neurological:      Mental Status: She is alert and oriented to person, place, and time.      Gait: Gait is intact.   Psychiatric:         Mood and Affect: Mood and affect normal.         Behavior: Behavior normal.         Thought Content: Thought content normal.         Cognition and Memory: Memory normal.         Judgment: Judgment normal.         Labs:  CBC:   WBC   Date Value Ref Range Status   03/17/2022 14.43 (H) 3.90 - 12.70 K/uL Final       Hemoglobin   Date Value Ref Range Status   03/17/2022 11.9 (L) 12.0 - 16.0 g/dL Final       Hematocrit   Date Value Ref Range Status   03/17/2022 38.8 37.0 - 48.5 % Final       MCV   Date Value Ref Range Status   03/17/2022 96 82 - 98 fL Final       Platelets   Date Value Ref Range Status   03/17/2022 332 150 - 450 K/uL Final           LFT:   Lab Results   Component Value Date    AST 12 03/17/2022    ALKPHOS 109 03/17/2022    BILITOT 0.4 03/17/2022       Albumin:   Albumin   Date Value Ref Range Status   03/17/2022 3.3 (L) 3.5 - 5.2 g/dL Final     Protein:   Total Protein   Date Value Ref Range Status   03/17/2022 8.0 6.0 - 8.4 g/dL Final       Radiology:I have  reviewed all pertinent imaging results/findings within the past 24 hours.      > 50% of 90 min visit spent in chart review, face to face discussion of goals of care,  symptom assessment, coordination of care and emotional support.      Signature: Sneha Shah MD

## 2022-03-25 ENCOUNTER — INFUSION (OUTPATIENT)
Dept: INFUSION THERAPY | Facility: HOSPITAL | Age: 72
End: 2022-03-25
Attending: INTERNAL MEDICINE
Payer: MEDICARE

## 2022-03-25 ENCOUNTER — DOCUMENTATION ONLY (OUTPATIENT)
Dept: HEPATOLOGY | Facility: HOSPITAL | Age: 72
End: 2022-03-25
Payer: MEDICARE

## 2022-03-25 VITALS
BODY MASS INDEX: 21.19 KG/M2 | RESPIRATION RATE: 18 BRPM | TEMPERATURE: 98 F | WEIGHT: 139.31 LBS | DIASTOLIC BLOOD PRESSURE: 66 MMHG | HEART RATE: 84 BPM | SYSTOLIC BLOOD PRESSURE: 118 MMHG

## 2022-03-25 DIAGNOSIS — C34.12 MALIGNANT NEOPLASM OF UPPER LOBE OF LEFT LUNG: Primary | ICD-10-CM

## 2022-03-25 PROCEDURE — 77014 HC CT GUIDANCE RADIATION THERAPY FLDS PLACEMENT: CPT | Mod: TC,PN | Performed by: STUDENT IN AN ORGANIZED HEALTH CARE EDUCATION/TRAINING PROGRAM

## 2022-03-25 PROCEDURE — 77014 PR  CT GUIDANCE PLACEMENT RAD THERAPY FIELDS: ICD-10-PCS | Mod: 26,,, | Performed by: STUDENT IN AN ORGANIZED HEALTH CARE EDUCATION/TRAINING PROGRAM

## 2022-03-25 PROCEDURE — 25000003 PHARM REV CODE 250: Mod: PN | Performed by: INTERNAL MEDICINE

## 2022-03-25 PROCEDURE — 77386 HC IMRT, COMPLEX: CPT | Mod: PN | Performed by: STUDENT IN AN ORGANIZED HEALTH CARE EDUCATION/TRAINING PROGRAM

## 2022-03-25 PROCEDURE — 96375 TX/PRO/DX INJ NEW DRUG ADDON: CPT | Mod: PN

## 2022-03-25 PROCEDURE — 77014 PR  CT GUIDANCE PLACEMENT RAD THERAPY FIELDS: CPT | Mod: 26,,, | Performed by: STUDENT IN AN ORGANIZED HEALTH CARE EDUCATION/TRAINING PROGRAM

## 2022-03-25 PROCEDURE — 96417 CHEMO IV INFUS EACH ADDL SEQ: CPT | Mod: PN

## 2022-03-25 PROCEDURE — 63600175 PHARM REV CODE 636 W HCPCS: Mod: PN | Performed by: INTERNAL MEDICINE

## 2022-03-25 PROCEDURE — 96367 TX/PROPH/DG ADDL SEQ IV INF: CPT | Mod: PN

## 2022-03-25 PROCEDURE — 96413 CHEMO IV INFUSION 1 HR: CPT | Mod: PN

## 2022-03-25 RX ORDER — HEPARIN 100 UNIT/ML
500 SYRINGE INTRAVENOUS
Status: DISCONTINUED | OUTPATIENT
Start: 2022-03-25 | End: 2022-03-25 | Stop reason: HOSPADM

## 2022-03-25 RX ORDER — SODIUM CHLORIDE 0.9 % (FLUSH) 0.9 %
10 SYRINGE (ML) INJECTION
Status: DISCONTINUED | OUTPATIENT
Start: 2022-03-25 | End: 2022-03-25 | Stop reason: HOSPADM

## 2022-03-25 RX ORDER — DIPHENHYDRAMINE HYDROCHLORIDE 50 MG/ML
50 INJECTION INTRAMUSCULAR; INTRAVENOUS ONCE AS NEEDED
Status: DISCONTINUED | OUTPATIENT
Start: 2022-03-25 | End: 2022-03-25 | Stop reason: HOSPADM

## 2022-03-25 RX ORDER — SODIUM CHLORIDE 0.9 % (FLUSH) 0.9 %
10 SYRINGE (ML) INJECTION
Status: CANCELLED | OUTPATIENT
Start: 2022-03-25

## 2022-03-25 RX ORDER — FAMOTIDINE 10 MG/ML
20 INJECTION INTRAVENOUS
Status: CANCELLED | OUTPATIENT
Start: 2022-03-25

## 2022-03-25 RX ORDER — HEPARIN 100 UNIT/ML
500 SYRINGE INTRAVENOUS
Status: CANCELLED | OUTPATIENT
Start: 2022-03-25

## 2022-03-25 RX ORDER — DIPHENHYDRAMINE HYDROCHLORIDE 50 MG/ML
50 INJECTION INTRAMUSCULAR; INTRAVENOUS ONCE AS NEEDED
Status: CANCELLED | OUTPATIENT
Start: 2022-03-25

## 2022-03-25 RX ORDER — EPINEPHRINE 0.3 MG/.3ML
0.3 INJECTION SUBCUTANEOUS ONCE AS NEEDED
Status: CANCELLED | OUTPATIENT
Start: 2022-03-25

## 2022-03-25 RX ORDER — EPINEPHRINE 0.3 MG/.3ML
0.3 INJECTION SUBCUTANEOUS ONCE AS NEEDED
Status: DISCONTINUED | OUTPATIENT
Start: 2022-03-25 | End: 2022-03-25 | Stop reason: HOSPADM

## 2022-03-25 RX ORDER — FAMOTIDINE 10 MG/ML
20 INJECTION INTRAVENOUS
Status: COMPLETED | OUTPATIENT
Start: 2022-03-25 | End: 2022-03-25

## 2022-03-25 RX ADMIN — CARBOPLATIN 135 MG: 10 INJECTION, SOLUTION INTRAVENOUS at 12:03

## 2022-03-25 RX ADMIN — PALONOSETRON 0.25 MG: 0.05 INJECTION, SOLUTION INTRAVENOUS at 10:03

## 2022-03-25 RX ADMIN — DIPHENHYDRAMINE HYDROCHLORIDE 50 MG: 50 INJECTION INTRAMUSCULAR; INTRAVENOUS at 10:03

## 2022-03-25 RX ADMIN — FAMOTIDINE 20 MG: 10 INJECTION INTRAVENOUS at 10:03

## 2022-03-25 RX ADMIN — Medication 500 UNITS: at 03:03

## 2022-03-25 RX ADMIN — SODIUM CHLORIDE: 0.9 INJECTION, SOLUTION INTRAVENOUS at 09:03

## 2022-03-25 RX ADMIN — PACLITAXEL 78 MG: 6 INJECTION, SOLUTION, CONCENTRATE INTRAVENOUS at 11:03

## 2022-03-25 NOTE — PROGRESS NOTES
Spoke with patient this am. Daughter Li was able to hear. Patient getting Chemo infusion.  Took 2 oxycodone after receiving her RX. Was able to sleep until 430 am.  Took 1 oxycodone at 530 and another one at 830 prior to chemo. Feels well now. Not sleepy. Will call her on Monday and see how she is.  Sneha Shah MD  Palliative Medicine

## 2022-03-28 ENCOUNTER — DOCUMENTATION ONLY (OUTPATIENT)
Dept: HEPATOLOGY | Facility: HOSPITAL | Age: 72
End: 2022-03-28
Payer: MEDICARE

## 2022-03-28 DIAGNOSIS — G89.3 CANCER ASSOCIATED PAIN: ICD-10-CM

## 2022-03-28 DIAGNOSIS — Z51.5 PALLIATIVE CARE BY SPECIALIST: Primary | ICD-10-CM

## 2022-03-28 DIAGNOSIS — C34.12 CANCER OF UPPER LOBE OF LEFT LUNG: ICD-10-CM

## 2022-03-28 RX ORDER — METHADONE HYDROCHLORIDE 5 MG/1
5 TABLET ORAL EVERY 12 HOURS
Qty: 30 TABLET | Refills: 0 | Status: SHIPPED | OUTPATIENT
Start: 2022-03-28 | End: 2022-04-08 | Stop reason: SDUPTHER

## 2022-03-28 NOTE — PROGRESS NOTES
reviewed:      Patient said she is taking oxycodone 10 mg every 3 hours or 8 times a day. Further questioning the pain wakes her up between 1 and 3 am. She had not taken any oxycodone since 6 pm. So not really taking 80 mg per day?  Discussed methadone with her at appointment last week.  Will give methadone 5 mg every 12 hours as her long acting pain med and use oxycodone 10 mg every 3 hours as needed for breakthrough.  Monitor intake and activity. Don't get constipated.  She said she understood.  Sneha Shah MD

## 2022-03-29 ENCOUNTER — DOCUMENTATION ONLY (OUTPATIENT)
Dept: RADIATION ONCOLOGY | Facility: CLINIC | Age: 72
End: 2022-03-29
Payer: MEDICARE

## 2022-03-29 PROCEDURE — 77386 HC IMRT, COMPLEX: CPT | Mod: PN | Performed by: STUDENT IN AN ORGANIZED HEALTH CARE EDUCATION/TRAINING PROGRAM

## 2022-03-29 PROCEDURE — 77014 PR  CT GUIDANCE PLACEMENT RAD THERAPY FIELDS: ICD-10-PCS | Mod: 26,,, | Performed by: STUDENT IN AN ORGANIZED HEALTH CARE EDUCATION/TRAINING PROGRAM

## 2022-03-29 PROCEDURE — 77014 HC CT GUIDANCE RADIATION THERAPY FLDS PLACEMENT: CPT | Mod: TC,PN | Performed by: STUDENT IN AN ORGANIZED HEALTH CARE EDUCATION/TRAINING PROGRAM

## 2022-03-29 PROCEDURE — 77014 PR  CT GUIDANCE PLACEMENT RAD THERAPY FIELDS: CPT | Mod: 26,,, | Performed by: STUDENT IN AN ORGANIZED HEALTH CARE EDUCATION/TRAINING PROGRAM

## 2022-03-29 NOTE — PLAN OF CARE
Patient completed 13 of 30 outpatient radiation treatments without difficulty.  All questions/concerns addressed by MD this visit.

## 2022-03-30 PROCEDURE — 77014 HC CT GUIDANCE RADIATION THERAPY FLDS PLACEMENT: CPT | Mod: TC,PN | Performed by: STUDENT IN AN ORGANIZED HEALTH CARE EDUCATION/TRAINING PROGRAM

## 2022-03-30 PROCEDURE — 77386 HC IMRT, COMPLEX: CPT | Mod: PN | Performed by: STUDENT IN AN ORGANIZED HEALTH CARE EDUCATION/TRAINING PROGRAM

## 2022-03-30 PROCEDURE — 77014 PR  CT GUIDANCE PLACEMENT RAD THERAPY FIELDS: CPT | Mod: 26,,, | Performed by: STUDENT IN AN ORGANIZED HEALTH CARE EDUCATION/TRAINING PROGRAM

## 2022-03-30 PROCEDURE — 77014 PR  CT GUIDANCE PLACEMENT RAD THERAPY FIELDS: ICD-10-PCS | Mod: 26,,, | Performed by: STUDENT IN AN ORGANIZED HEALTH CARE EDUCATION/TRAINING PROGRAM

## 2022-03-31 ENCOUNTER — DOCUMENTATION ONLY (OUTPATIENT)
Dept: INFUSION THERAPY | Facility: HOSPITAL | Age: 72
End: 2022-03-31
Payer: MEDICARE

## 2022-03-31 ENCOUNTER — INFUSION (OUTPATIENT)
Dept: INFUSION THERAPY | Facility: HOSPITAL | Age: 72
End: 2022-03-31
Payer: MEDICARE

## 2022-03-31 ENCOUNTER — OFFICE VISIT (OUTPATIENT)
Dept: HEMATOLOGY/ONCOLOGY | Facility: CLINIC | Age: 72
End: 2022-03-31
Payer: MEDICARE

## 2022-03-31 VITALS
RESPIRATION RATE: 18 BRPM | OXYGEN SATURATION: 100 % | HEART RATE: 92 BPM | TEMPERATURE: 98 F | BODY MASS INDEX: 20.92 KG/M2 | DIASTOLIC BLOOD PRESSURE: 70 MMHG | SYSTOLIC BLOOD PRESSURE: 118 MMHG | HEIGHT: 68 IN | WEIGHT: 138 LBS

## 2022-03-31 VITALS
TEMPERATURE: 98 F | SYSTOLIC BLOOD PRESSURE: 116 MMHG | DIASTOLIC BLOOD PRESSURE: 78 MMHG | RESPIRATION RATE: 18 BRPM | HEART RATE: 78 BPM

## 2022-03-31 DIAGNOSIS — B37.0 ORAL THRUSH: ICD-10-CM

## 2022-03-31 DIAGNOSIS — D84.821 IMMUNODEFICIENCY DUE TO DRUGS: ICD-10-CM

## 2022-03-31 DIAGNOSIS — K12.30 ORAL MUCOSITIS: ICD-10-CM

## 2022-03-31 DIAGNOSIS — Z79.899 IMMUNODEFICIENCY DUE TO DRUGS: ICD-10-CM

## 2022-03-31 DIAGNOSIS — E11.9 TYPE 2 DIABETES MELLITUS WITHOUT COMPLICATION, WITHOUT LONG-TERM CURRENT USE OF INSULIN: ICD-10-CM

## 2022-03-31 DIAGNOSIS — C34.12 MALIGNANT NEOPLASM OF UPPER LOBE OF LEFT LUNG: Primary | ICD-10-CM

## 2022-03-31 DIAGNOSIS — C34.12 CANCER OF UPPER LOBE OF LEFT LUNG: Primary | ICD-10-CM

## 2022-03-31 DIAGNOSIS — G89.3 CANCER ASSOCIATED PAIN: ICD-10-CM

## 2022-03-31 DIAGNOSIS — C78.1 SECONDARY MALIGNANCY OF ANTERIOR MEDIASTINUM: ICD-10-CM

## 2022-03-31 PROCEDURE — 77014 PR  CT GUIDANCE PLACEMENT RAD THERAPY FIELDS: ICD-10-PCS | Mod: 26,,, | Performed by: STUDENT IN AN ORGANIZED HEALTH CARE EDUCATION/TRAINING PROGRAM

## 2022-03-31 PROCEDURE — 77014 PR  CT GUIDANCE PLACEMENT RAD THERAPY FIELDS: CPT | Mod: 26,,, | Performed by: STUDENT IN AN ORGANIZED HEALTH CARE EDUCATION/TRAINING PROGRAM

## 2022-03-31 PROCEDURE — 77014 HC CT GUIDANCE RADIATION THERAPY FLDS PLACEMENT: CPT | Mod: TC,PN | Performed by: STUDENT IN AN ORGANIZED HEALTH CARE EDUCATION/TRAINING PROGRAM

## 2022-03-31 PROCEDURE — 25000003 PHARM REV CODE 250: Mod: PN | Performed by: INTERNAL MEDICINE

## 2022-03-31 PROCEDURE — 63600175 PHARM REV CODE 636 W HCPCS: Mod: PN | Performed by: INTERNAL MEDICINE

## 2022-03-31 PROCEDURE — 77386 HC IMRT, COMPLEX: CPT | Mod: PN | Performed by: STUDENT IN AN ORGANIZED HEALTH CARE EDUCATION/TRAINING PROGRAM

## 2022-03-31 PROCEDURE — 99215 PR OFFICE/OUTPT VISIT, EST, LEVL V, 40-54 MIN: ICD-10-PCS | Mod: S$PBB,,, | Performed by: INTERNAL MEDICINE

## 2022-03-31 PROCEDURE — 96367 TX/PROPH/DG ADDL SEQ IV INF: CPT | Mod: PN

## 2022-03-31 PROCEDURE — 99215 OFFICE O/P EST HI 40 MIN: CPT | Mod: S$PBB,,, | Performed by: INTERNAL MEDICINE

## 2022-03-31 PROCEDURE — 96375 TX/PRO/DX INJ NEW DRUG ADDON: CPT | Mod: PN

## 2022-03-31 PROCEDURE — 99999 PR PBB SHADOW E&M-EST. PATIENT-LVL V: ICD-10-PCS | Mod: PBBFAC,,, | Performed by: INTERNAL MEDICINE

## 2022-03-31 PROCEDURE — 99215 OFFICE O/P EST HI 40 MIN: CPT | Mod: PBBFAC,PN,25 | Performed by: INTERNAL MEDICINE

## 2022-03-31 PROCEDURE — 96413 CHEMO IV INFUSION 1 HR: CPT | Mod: PN

## 2022-03-31 PROCEDURE — 99999 PR PBB SHADOW E&M-EST. PATIENT-LVL V: CPT | Mod: PBBFAC,,, | Performed by: INTERNAL MEDICINE

## 2022-03-31 PROCEDURE — 96417 CHEMO IV INFUS EACH ADDL SEQ: CPT | Mod: PN

## 2022-03-31 RX ORDER — EPINEPHRINE 0.3 MG/.3ML
0.3 INJECTION SUBCUTANEOUS ONCE AS NEEDED
Status: CANCELLED | OUTPATIENT
Start: 2022-03-31

## 2022-03-31 RX ORDER — FAMOTIDINE 10 MG/ML
20 INJECTION INTRAVENOUS
Status: COMPLETED | OUTPATIENT
Start: 2022-03-31 | End: 2022-03-31

## 2022-03-31 RX ORDER — PROMETHAZINE HYDROCHLORIDE 25 MG/1
25 TABLET ORAL EVERY 6 HOURS PRN
COMMUNITY
Start: 2022-03-28 | End: 2022-07-27 | Stop reason: SDUPTHER

## 2022-03-31 RX ORDER — NYSTATIN 100000 [USP'U]/ML
4 SUSPENSION ORAL 4 TIMES DAILY
Qty: 160 ML | Refills: 0 | Status: SHIPPED | OUTPATIENT
Start: 2022-03-31 | End: 2022-04-10

## 2022-03-31 RX ORDER — DIPHENHYDRAMINE HYDROCHLORIDE 50 MG/ML
50 INJECTION INTRAMUSCULAR; INTRAVENOUS ONCE AS NEEDED
Status: DISCONTINUED | OUTPATIENT
Start: 2022-03-31 | End: 2022-03-31 | Stop reason: HOSPADM

## 2022-03-31 RX ORDER — HEPARIN 100 UNIT/ML
500 SYRINGE INTRAVENOUS
Status: CANCELLED | OUTPATIENT
Start: 2022-03-31

## 2022-03-31 RX ORDER — SODIUM CHLORIDE 0.9 % (FLUSH) 0.9 %
10 SYRINGE (ML) INJECTION
Status: DISCONTINUED | OUTPATIENT
Start: 2022-03-31 | End: 2022-03-31 | Stop reason: HOSPADM

## 2022-03-31 RX ORDER — FAMOTIDINE 10 MG/ML
20 INJECTION INTRAVENOUS
Status: CANCELLED | OUTPATIENT
Start: 2022-03-31

## 2022-03-31 RX ORDER — HEPARIN 100 UNIT/ML
500 SYRINGE INTRAVENOUS
Status: DISCONTINUED | OUTPATIENT
Start: 2022-03-31 | End: 2022-03-31 | Stop reason: HOSPADM

## 2022-03-31 RX ORDER — DIPHENHYDRAMINE HYDROCHLORIDE 50 MG/ML
50 INJECTION INTRAMUSCULAR; INTRAVENOUS ONCE AS NEEDED
Status: CANCELLED | OUTPATIENT
Start: 2022-03-31

## 2022-03-31 RX ORDER — SODIUM CHLORIDE 0.9 % (FLUSH) 0.9 %
10 SYRINGE (ML) INJECTION
Status: CANCELLED | OUTPATIENT
Start: 2022-03-31

## 2022-03-31 RX ORDER — EPINEPHRINE 0.3 MG/.3ML
0.3 INJECTION SUBCUTANEOUS ONCE AS NEEDED
Status: DISCONTINUED | OUTPATIENT
Start: 2022-03-31 | End: 2022-03-31 | Stop reason: HOSPADM

## 2022-03-31 RX ORDER — DICLOFENAC SODIUM 75 MG/1
75 TABLET, DELAYED RELEASE ORAL 2 TIMES DAILY
COMMUNITY
Start: 2022-03-18 | End: 2023-01-01 | Stop reason: CLARIF

## 2022-03-31 RX ADMIN — SODIUM CHLORIDE: 0.9 INJECTION, SOLUTION INTRAVENOUS at 09:03

## 2022-03-31 RX ADMIN — CARBOPLATIN 135 MG: 10 INJECTION, SOLUTION INTRAVENOUS at 12:03

## 2022-03-31 RX ADMIN — DEXAMETHASONE SODIUM PHOSPHATE: 4 INJECTION, SOLUTION INTRA-ARTICULAR; INTRALESIONAL; INTRAMUSCULAR; INTRAVENOUS; SOFT TISSUE at 10:03

## 2022-03-31 RX ADMIN — FAMOTIDINE 20 MG: 10 INJECTION INTRAVENOUS at 10:03

## 2022-03-31 RX ADMIN — DIPHENHYDRAMINE HYDROCHLORIDE 50 MG: 50 INJECTION INTRAMUSCULAR; INTRAVENOUS at 10:03

## 2022-03-31 RX ADMIN — Medication 500 UNITS: at 01:03

## 2022-03-31 RX ADMIN — PACLITAXEL 78 MG: 6 INJECTION, SOLUTION, CONCENTRATE INTRAVENOUS at 11:03

## 2022-03-31 NOTE — PLAN OF CARE
Tolerated taxol/carbo well.  No reactions noted.  No questions or concerns at this time. Ambulated off unit in NAD.

## 2022-03-31 NOTE — PROGRESS NOTES
"Subjective:       Patient ID: Teresa Earl is a 71 y.o. female.    Chief Complaint: Cancer of upper lobe of left lung  Mrs. Earl is a 71 year old female who underwent a chest xray in September 2022 prior to a discectomy and that showed a lung mass.      She then underwent CT chest also on September 2022 which showed spiculated mass in anterior segment GRACIELA, multiple borderline/enlarged mediastinal lymph nodes.     PFT's on 11/5/21 revealed FVC 1.96 (61%), FEV1 1.47 (60%), TLC 73%, DLCO 9.18 (47%)     PET/CT on 11/11/2022 reveals  FDG avid, spiculated pleural based mass in the GRACIELA extending into the anterior mediastinum, measuring 3.5cm. SUV 8.5. no enlarged or FDG avid lymphadenopathy in chest. No distant mets but FDG avid L parotid lesion     Navigational bronch on 12/13/2021 revealed "no endobronchial disease medial GRACIELA sampled. EBUS for station 4L, 7, 11. Path: GRACIELA suspicious but not diagnostic for malignancy. 4L, 7, 11L negative for malignancy"     On 1/25/22: he met with CT surgery and is felt not to be a candidate for resection due to invasion of mediastinum.     MRI brain on 2/12/2022 revealed  no intracranial mets     CT Guided biopsy on 2/24/2022 reveals  of GRACIELA demonstrating squamous cell carcinoma. Left parotid gland demonstrating pleomorphic adenomal     Repeat CT scans on 2/28/2022 revealed "56 x 39 x 15 mm spiculated pleural-based left upper lobe mass (compatible with biopsy proven diagnosis of squamous cell carcinoma) which abuts and invades the anterior mediastinum with compression and/or invasion of the brachial cephalic vein and intimate association with the left internal mammary vessels and anterior margin of the thoracic aortic arch. Scattered solid pulmonary nodules measuring up to 3 mm in size for which short-term imaging follow-up is recommended. Centrilobular emphysematous lung architecture.  21 x 22 mm hypodense area within the medial segment of the left hepatic lobe abutting the interlobar " "fissure.  While it is possible that this represents focal fatty infiltration, an underlying solitary liver lesion or focus of metastatic disease cannot be excluded.  The patient's recent PET-CT scan showed no abnormal FDG hypermetabolism in this vicinity.  Consider additional investigation with contrast enhanced MRI of the liver. Stable 26 x 39 mm left adnexal hypodensity which show no FDG hypermetabolism at the time of the patient's recent PET-CT scan. Tiny fat containing umbilical hernia. 12 mm short axis right paratracheal lymph node which showed no FDG hypermetabolism at the time of the patient's recent PET-CT scan and which remains unchanged in size since that study"  Follow-up MRI abdomen on 3/3/2022 reveals "Hypoattenuating area on prior CT near the falciform ligament corresponds to an area of focal fatty infiltration.  No suspicious liver lesions are present"     HPI She is on definitive chemo/RT with weekly Carboplatin and Taxol, week 4 today. Appetite is poor, but no issues with swallowing. Taste is off as well  She denies any nausea, vomiting, diarrhea, constipation, abdominal pain, chest pain, shortness of breath, leg swelling, fatigue, pain, headache, dizziness, or mood changes. Her ECOG PS is zero. She is accompanied by her daughter        Review of Systems   Constitutional: Positive for appetite change. Negative for fatigue and unexpected weight change.   HENT: Negative for mouth sores.    Eyes: Negative for visual disturbance.   Respiratory: Negative for cough and shortness of breath.    Cardiovascular: Negative for chest pain.   Gastrointestinal: Negative for abdominal pain and diarrhea.   Genitourinary: Negative for frequency.   Musculoskeletal: Negative for back pain.   Integumentary:  Negative for rash.   Neurological: Negative for headaches.   Hematological: Negative for adenopathy.   Psychiatric/Behavioral: The patient is not nervous/anxious.    All other systems reviewed and are negative.      "   Objective:      Physical Exam  Vitals reviewed.   Constitutional:       Appearance: She is well-developed.   HENT:      Mouth/Throat:      Pharynx: No oropharyngeal exudate.      Comments: Oral thrush +  Cardiovascular:      Rate and Rhythm: Normal rate.      Heart sounds: Normal heart sounds.   Pulmonary:      Effort: Pulmonary effort is normal.      Breath sounds: Normal breath sounds. No wheezing.   Abdominal:      General: Bowel sounds are normal.      Palpations: Abdomen is soft.      Tenderness: There is no abdominal tenderness.   Musculoskeletal:         General: No tenderness.   Lymphadenopathy:      Cervical: No cervical adenopathy.   Skin:     General: Skin is warm and dry.      Findings: No rash.   Neurological:      Mental Status: She is alert and oriented to person, place, and time.      Coordination: Coordination normal.   Psychiatric:         Thought Content: Thought content normal.         Judgment: Judgment normal.           LABS:  WBC   Date Value Ref Range Status   03/31/2022 13.42 (H) 3.90 - 12.70 K/uL Final     Hemoglobin   Date Value Ref Range Status   03/31/2022 12.5 12.0 - 16.0 g/dL Final     Hematocrit   Date Value Ref Range Status   03/31/2022 40.3 37.0 - 48.5 % Final     Platelets   Date Value Ref Range Status   03/31/2022 279 150 - 450 K/uL Final     Gran # (ANC)   Date Value Ref Range Status   03/31/2022 12.0 (H) 1.8 - 7.7 K/uL Final     Comment:     The ANC is based on a white cell differential from an   automated cell counter. It has not been microscopically   reviewed for the presence of abnormal cells. Clinical   correlation is required.         Chemistry        Component Value Date/Time     (L) 03/31/2022 0815    K 4.9 03/31/2022 0815    CL 96 03/31/2022 0815    CO2 24 03/31/2022 0815    BUN 20 03/31/2022 0815    CREATININE 1.2 03/31/2022 0815     (H) 03/31/2022 0815        Component Value Date/Time    CALCIUM 9.6 03/31/2022 0815    ALKPHOS 112 03/31/2022 0815    AST  15 2022 0815    ALT 12 2022 0815    BILITOT 0.3 2022 0815    ESTGFRAFRICA 53 (A) 2022 0815    EGFRNONAA 46 (A) 2022 0815        Ma.7    Assessment:       Problem List Items Addressed This Visit     Cancer associated pain    Cancer of upper lobe of left lung - Primary    Immunodeficiency due to drugs    Secondary malignancy of anterior mediastinum    Type 2 diabetes mellitus without complication      Other Visit Diagnoses     Oral mucositis        Relevant Medications    magic mouthwash diphen/antac/lidoc/nysta    Oral thrush        Relevant Medications    nystatin (MYCOSTATIN) 100,000 unit/mL suspension          Plan:        1,2. Route Chart for Scheduling  Med Onc Route Chart for Scheduling    Treatment Plan Information   OP NSCLC PACLITAXEL + CARBOPLATIN (AUC) QW + RADIATION   Mere Styles MD   Upcoming Treatment Dates - OP NSCLC PACLITAXEL + CARBOPLATIN (AUC) QW + RADIATION    2022       Pre-Medications       diphenhydramine (BENADRYL) 50 mg in NS 50 mL IVPB       famotidine (PF) injection 20 mg       Chemotherapy       CARBOplatin (PARAPLATIN) in sodium chloride 0.9% 250 mL chemo infusion       PACLitaxeL (TAXOL) 45 mg/m2 = 78 mg in sodium chloride 0.9% 250 mL chemo infusion  2022       Pre-Medications       diphenhydramine (BENADRYL) 50 mg in NS 50 mL IVPB       famotidine (PF) injection 20 mg       Chemotherapy       CARBOplatin (PARAPLATIN) in sodium chloride 0.9% 250 mL chemo infusion       PACLitaxeL (TAXOL) 45 mg/m2 = 78 mg in sodium chloride 0.9% 250 mL chemo infusion    She will proceed with week 4 of concurrent chemo/RT with Carboplatin and Taxol and will return in 1 week for next cycle  Cancer related pain is better controlled with pain meds with palliative care  Escribed Dukes solution and oral Nystatin suspension for oral thrush and mucositis      Above care plan was discussed with patient and accompanying daughter and all questions were addressed to their  satisfaction

## 2022-03-31 NOTE — PROGRESS NOTES
Oncology   Chemotherapy Infusion Visit    Quick Social Service Status Follow Up   Met w/ pt briefly to follow up on social and emotional needs since initiation of treatment.  The pt reported doing well, so far and said her daughter, Li helps her to eat the right things bc she knows about these things since she teaches nutrition at [employer].    The pt was accompanied by her daughter, Li at chairside. The daughter reported moved to [place] to be closer to her mother and support her.    This LCSW provided the pt with a gas card.    The pt reported no other needs at this time.       Carla Martinez, LETICIA  03/31/2022  12:11 PM

## 2022-04-01 ENCOUNTER — HOSPITAL ENCOUNTER (OUTPATIENT)
Dept: RADIATION THERAPY | Facility: HOSPITAL | Age: 72
Discharge: HOME | End: 2022-04-01
Attending: STUDENT IN AN ORGANIZED HEALTH CARE EDUCATION/TRAINING PROGRAM
Payer: MEDICARE

## 2022-04-01 PROCEDURE — 77014 PR  CT GUIDANCE PLACEMENT RAD THERAPY FIELDS: CPT | Mod: 26,,, | Performed by: STUDENT IN AN ORGANIZED HEALTH CARE EDUCATION/TRAINING PROGRAM

## 2022-04-01 PROCEDURE — 77014 PR  CT GUIDANCE PLACEMENT RAD THERAPY FIELDS: ICD-10-PCS | Mod: 26,,, | Performed by: STUDENT IN AN ORGANIZED HEALTH CARE EDUCATION/TRAINING PROGRAM

## 2022-04-01 PROCEDURE — 77014 HC CT GUIDANCE RADIATION THERAPY FLDS PLACEMENT: CPT | Mod: TC,PN | Performed by: STUDENT IN AN ORGANIZED HEALTH CARE EDUCATION/TRAINING PROGRAM

## 2022-04-01 PROCEDURE — 77336 RADIATION PHYSICS CONSULT: CPT | Mod: PN | Performed by: STUDENT IN AN ORGANIZED HEALTH CARE EDUCATION/TRAINING PROGRAM

## 2022-04-01 PROCEDURE — 77386 HC IMRT, COMPLEX: CPT | Mod: PN | Performed by: STUDENT IN AN ORGANIZED HEALTH CARE EDUCATION/TRAINING PROGRAM

## 2022-04-04 PROCEDURE — 77014 PR  CT GUIDANCE PLACEMENT RAD THERAPY FIELDS: CPT | Mod: 26,,, | Performed by: STUDENT IN AN ORGANIZED HEALTH CARE EDUCATION/TRAINING PROGRAM

## 2022-04-04 PROCEDURE — 77014 HC CT GUIDANCE RADIATION THERAPY FLDS PLACEMENT: CPT | Mod: TC,PN | Performed by: STUDENT IN AN ORGANIZED HEALTH CARE EDUCATION/TRAINING PROGRAM

## 2022-04-04 PROCEDURE — 77014 PR  CT GUIDANCE PLACEMENT RAD THERAPY FIELDS: ICD-10-PCS | Mod: 26,,, | Performed by: STUDENT IN AN ORGANIZED HEALTH CARE EDUCATION/TRAINING PROGRAM

## 2022-04-04 PROCEDURE — 77386 HC IMRT, COMPLEX: CPT | Mod: PN | Performed by: STUDENT IN AN ORGANIZED HEALTH CARE EDUCATION/TRAINING PROGRAM

## 2022-04-05 ENCOUNTER — DOCUMENTATION ONLY (OUTPATIENT)
Dept: RADIATION ONCOLOGY | Facility: CLINIC | Age: 72
End: 2022-04-05
Payer: MEDICARE

## 2022-04-05 PROCEDURE — 77014 HC CT GUIDANCE RADIATION THERAPY FLDS PLACEMENT: CPT | Mod: TC,PN | Performed by: STUDENT IN AN ORGANIZED HEALTH CARE EDUCATION/TRAINING PROGRAM

## 2022-04-05 PROCEDURE — 77386 HC IMRT, COMPLEX: CPT | Mod: PN | Performed by: STUDENT IN AN ORGANIZED HEALTH CARE EDUCATION/TRAINING PROGRAM

## 2022-04-05 PROCEDURE — 77014 PR  CT GUIDANCE PLACEMENT RAD THERAPY FIELDS: ICD-10-PCS | Mod: 26,,, | Performed by: STUDENT IN AN ORGANIZED HEALTH CARE EDUCATION/TRAINING PROGRAM

## 2022-04-05 PROCEDURE — 77014 PR  CT GUIDANCE PLACEMENT RAD THERAPY FIELDS: CPT | Mod: 26,,, | Performed by: STUDENT IN AN ORGANIZED HEALTH CARE EDUCATION/TRAINING PROGRAM

## 2022-04-06 ENCOUNTER — TELEPHONE (OUTPATIENT)
Dept: HEMATOLOGY/ONCOLOGY | Facility: CLINIC | Age: 72
End: 2022-04-06
Payer: MEDICARE

## 2022-04-06 ENCOUNTER — PATIENT MESSAGE (OUTPATIENT)
Dept: ADMINISTRATIVE | Facility: OTHER | Age: 72
End: 2022-04-06
Payer: MEDICARE

## 2022-04-06 NOTE — TELEPHONE ENCOUNTER
Spoke with patient.  She is calling to state her relatives have what she thinks is a stomach bug---and she is not exhibiting any of the nausea/vomiting---but has had 2 loose stools so far today.  She is taking imodium--and she hasn't had any more loose stools since then.  She will play it by ear for tomorrow---and let nurse know if she decides she can't come tomorrow to clinic---as she is due for labs, MD, and chemo tomorrow.  She elected to not do RT today.

## 2022-04-06 NOTE — TELEPHONE ENCOUNTER
----- Message from Ella Barnes, Patient Care Assistant sent at 4/6/2022  9:45 AM CDT -----  Contact: henry  Type: Needs Medical Advice  Who Called:  henry  Best Call Back Number: [phone]  .    Additional Information: Henry would like to speak with fela , please call to speak with thank you

## 2022-04-07 ENCOUNTER — OFFICE VISIT (OUTPATIENT)
Dept: HEMATOLOGY/ONCOLOGY | Facility: CLINIC | Age: 72
End: 2022-04-07
Payer: MEDICARE

## 2022-04-07 ENCOUNTER — PATIENT MESSAGE (OUTPATIENT)
Dept: HEMATOLOGY/ONCOLOGY | Facility: CLINIC | Age: 72
End: 2022-04-07

## 2022-04-07 ENCOUNTER — INFUSION (OUTPATIENT)
Dept: INFUSION THERAPY | Facility: HOSPITAL | Age: 72
End: 2022-04-07
Attending: INTERNAL MEDICINE
Payer: MEDICARE

## 2022-04-07 ENCOUNTER — DOCUMENTATION ONLY (OUTPATIENT)
Dept: INFUSION THERAPY | Facility: HOSPITAL | Age: 72
End: 2022-04-07
Payer: MEDICARE

## 2022-04-07 VITALS
DIASTOLIC BLOOD PRESSURE: 61 MMHG | HEART RATE: 115 BPM | SYSTOLIC BLOOD PRESSURE: 98 MMHG | OXYGEN SATURATION: 94 % | HEIGHT: 68 IN | WEIGHT: 135.38 LBS | BODY MASS INDEX: 20.52 KG/M2 | TEMPERATURE: 97 F | RESPIRATION RATE: 18 BRPM

## 2022-04-07 VITALS
DIASTOLIC BLOOD PRESSURE: 61 MMHG | RESPIRATION RATE: 18 BRPM | SYSTOLIC BLOOD PRESSURE: 105 MMHG | WEIGHT: 135.38 LBS | HEART RATE: 92 BPM | BODY MASS INDEX: 20.52 KG/M2 | OXYGEN SATURATION: 94 % | HEIGHT: 68 IN | TEMPERATURE: 97 F

## 2022-04-07 DIAGNOSIS — D84.821 IMMUNODEFICIENCY DUE TO DRUGS: ICD-10-CM

## 2022-04-07 DIAGNOSIS — Z79.899 IMMUNODEFICIENCY DUE TO DRUGS: ICD-10-CM

## 2022-04-07 DIAGNOSIS — E11.9 TYPE 2 DIABETES MELLITUS WITHOUT COMPLICATION, WITHOUT LONG-TERM CURRENT USE OF INSULIN: ICD-10-CM

## 2022-04-07 DIAGNOSIS — C34.12 CANCER OF UPPER LOBE OF LEFT LUNG: Primary | ICD-10-CM

## 2022-04-07 DIAGNOSIS — N17.9 ACUTE KIDNEY INJURY: ICD-10-CM

## 2022-04-07 DIAGNOSIS — C34.12 MALIGNANT NEOPLASM OF UPPER LOBE OF LEFT LUNG: Primary | ICD-10-CM

## 2022-04-07 DIAGNOSIS — C78.1 SECONDARY MALIGNANCY OF ANTERIOR MEDIASTINUM: ICD-10-CM

## 2022-04-07 PROCEDURE — 77014 PR  CT GUIDANCE PLACEMENT RAD THERAPY FIELDS: CPT | Mod: 26,,, | Performed by: STUDENT IN AN ORGANIZED HEALTH CARE EDUCATION/TRAINING PROGRAM

## 2022-04-07 PROCEDURE — 96375 TX/PRO/DX INJ NEW DRUG ADDON: CPT | Mod: PN

## 2022-04-07 PROCEDURE — 63600175 PHARM REV CODE 636 W HCPCS: Mod: PN | Performed by: INTERNAL MEDICINE

## 2022-04-07 PROCEDURE — 99215 OFFICE O/P EST HI 40 MIN: CPT | Mod: S$PBB,,, | Performed by: INTERNAL MEDICINE

## 2022-04-07 PROCEDURE — 25000003 PHARM REV CODE 250: Mod: PN | Performed by: INTERNAL MEDICINE

## 2022-04-07 PROCEDURE — 99215 PR OFFICE/OUTPT VISIT, EST, LEVL V, 40-54 MIN: ICD-10-PCS | Mod: S$PBB,,, | Performed by: INTERNAL MEDICINE

## 2022-04-07 PROCEDURE — 96417 CHEMO IV INFUS EACH ADDL SEQ: CPT | Mod: PN

## 2022-04-07 PROCEDURE — 99213 OFFICE O/P EST LOW 20 MIN: CPT | Mod: PBBFAC,PN,25 | Performed by: INTERNAL MEDICINE

## 2022-04-07 PROCEDURE — 77014 PR  CT GUIDANCE PLACEMENT RAD THERAPY FIELDS: ICD-10-PCS | Mod: 26,,, | Performed by: STUDENT IN AN ORGANIZED HEALTH CARE EDUCATION/TRAINING PROGRAM

## 2022-04-07 PROCEDURE — 99999 PR PBB SHADOW E&M-EST. PATIENT-LVL III: CPT | Mod: PBBFAC,,, | Performed by: INTERNAL MEDICINE

## 2022-04-07 PROCEDURE — 77014 HC CT GUIDANCE RADIATION THERAPY FLDS PLACEMENT: CPT | Mod: TC,PN | Performed by: STUDENT IN AN ORGANIZED HEALTH CARE EDUCATION/TRAINING PROGRAM

## 2022-04-07 PROCEDURE — 96367 TX/PROPH/DG ADDL SEQ IV INF: CPT | Mod: PN

## 2022-04-07 PROCEDURE — 96413 CHEMO IV INFUSION 1 HR: CPT | Mod: PN

## 2022-04-07 PROCEDURE — 77386 HC IMRT, COMPLEX: CPT | Mod: PN | Performed by: STUDENT IN AN ORGANIZED HEALTH CARE EDUCATION/TRAINING PROGRAM

## 2022-04-07 PROCEDURE — 99999 PR PBB SHADOW E&M-EST. PATIENT-LVL III: ICD-10-PCS | Mod: PBBFAC,,, | Performed by: INTERNAL MEDICINE

## 2022-04-07 RX ORDER — SODIUM CHLORIDE 0.9 % (FLUSH) 0.9 %
10 SYRINGE (ML) INJECTION
Status: CANCELLED | OUTPATIENT
Start: 2022-04-07

## 2022-04-07 RX ORDER — DIPHENHYDRAMINE HYDROCHLORIDE 50 MG/ML
50 INJECTION INTRAMUSCULAR; INTRAVENOUS ONCE AS NEEDED
Status: CANCELLED | OUTPATIENT
Start: 2022-04-07

## 2022-04-07 RX ORDER — FAMOTIDINE 10 MG/ML
20 INJECTION INTRAVENOUS
Status: COMPLETED | OUTPATIENT
Start: 2022-04-07 | End: 2022-04-07

## 2022-04-07 RX ORDER — SODIUM CHLORIDE 0.9 % (FLUSH) 0.9 %
10 SYRINGE (ML) INJECTION
Status: DISCONTINUED | OUTPATIENT
Start: 2022-04-07 | End: 2022-04-07 | Stop reason: HOSPADM

## 2022-04-07 RX ORDER — HEPARIN 100 UNIT/ML
500 SYRINGE INTRAVENOUS
Status: CANCELLED | OUTPATIENT
Start: 2022-04-07

## 2022-04-07 RX ORDER — FAMOTIDINE 10 MG/ML
20 INJECTION INTRAVENOUS
Status: CANCELLED | OUTPATIENT
Start: 2022-04-07

## 2022-04-07 RX ORDER — EPINEPHRINE 0.3 MG/.3ML
0.3 INJECTION SUBCUTANEOUS ONCE AS NEEDED
Status: CANCELLED | OUTPATIENT
Start: 2022-04-07

## 2022-04-07 RX ORDER — HEPARIN 100 UNIT/ML
500 SYRINGE INTRAVENOUS
Status: DISCONTINUED | OUTPATIENT
Start: 2022-04-07 | End: 2022-04-07 | Stop reason: HOSPADM

## 2022-04-07 RX ORDER — SODIUM CHLORIDE 9 MG/ML
1000 INJECTION, SOLUTION INTRAVENOUS
Status: COMPLETED | OUTPATIENT
Start: 2022-04-07 | End: 2022-04-07

## 2022-04-07 RX ADMIN — CARBOPLATIN 115 MG: 10 INJECTION, SOLUTION INTRAVENOUS at 01:04

## 2022-04-07 RX ADMIN — SODIUM CHLORIDE 1000 ML: 0.9 INJECTION, SOLUTION INTRAVENOUS at 10:04

## 2022-04-07 RX ADMIN — SODIUM CHLORIDE: 0.9 INJECTION, SOLUTION INTRAVENOUS at 12:04

## 2022-04-07 RX ADMIN — Medication 500 UNITS: at 02:04

## 2022-04-07 RX ADMIN — FAMOTIDINE 20 MG: 10 INJECTION INTRAVENOUS at 11:04

## 2022-04-07 RX ADMIN — DIPHENHYDRAMINE HYDROCHLORIDE 50 MG: 50 INJECTION INTRAMUSCULAR; INTRAVENOUS at 11:04

## 2022-04-07 RX ADMIN — PACLITAXEL 78 MG: 6 INJECTION, SOLUTION, CONCENTRATE INTRAVENOUS at 12:04

## 2022-04-07 RX ADMIN — PALONOSETRON: 0.05 INJECTION, SOLUTION INTRAVENOUS at 11:04

## 2022-04-07 NOTE — PLAN OF CARE
Problem: Fatigue (Radiation, External Beam)  Goal: Improved Activity Tolerance  Outcome: Ongoing, Progressing  Intervention: Promote Improved Energy  Flowsheets (Taken 4/7/2022 1201)  Fatigue Management: frequent rest breaks encouraged  Sleep/Rest Enhancement: regular sleep/rest pattern promoted  Activity Management: Ambulated -L4  Environmental Support: rest periods encouraged     Problem: Adult Inpatient Plan of Care  Goal: Plan of Care Review  Outcome: Ongoing, Progressing  Flowsheets (Taken 4/7/2022 1201)  Plan of Care Reviewed With:   patient   daughter  Goal: Patient-Specific Goal (Individualized)  Outcome: Ongoing, Progressing  Flowsheets (Taken 4/7/2022 1201)  Anxieties, Fears or Concerns: None  Individualized Care Needs: Recliner, snacks, ice, dimmed lights  Patient-Specific Goals (Include Timeframe): Free ofS/S of reaction with treatment.    Patient to Infusion for Taxol and Carbo following her appointment with Dr. Styles. Accompanied by her daughter. Treatment plan reviewed. VSS. Tolerated infusions. Provided with copy of upcoming appointment schedule. Escorted to the front lobby for discharge to home.

## 2022-04-07 NOTE — PROGRESS NOTES
Oncology   Chemotherapy Infusion Visit    Quick Social Service Status Follow Up   Met w/ pt and her daughter-in-law briefly to follow up on social and emotional needs since initiation of treatment.    The pt's daughter in law was in town for this week to care for her mother in law. The pt said her children have it all planned out that each of her kids will take a week each to help their mom while she is in treatment.    A gas card was provided to this pt.    The pt reported no other needs at this time.    Carla Martinez LCSW  04/07/2022  3:44 PM

## 2022-04-08 DIAGNOSIS — C34.12 CANCER OF UPPER LOBE OF LEFT LUNG: ICD-10-CM

## 2022-04-08 DIAGNOSIS — Z51.5 PALLIATIVE CARE BY SPECIALIST: ICD-10-CM

## 2022-04-08 DIAGNOSIS — M54.12 CERVICAL RADICULOPATHY: ICD-10-CM

## 2022-04-08 DIAGNOSIS — G89.3 CANCER ASSOCIATED PAIN: ICD-10-CM

## 2022-04-08 PROCEDURE — 77386 HC IMRT, COMPLEX: CPT | Mod: PN | Performed by: STUDENT IN AN ORGANIZED HEALTH CARE EDUCATION/TRAINING PROGRAM

## 2022-04-08 PROCEDURE — 77014 PR  CT GUIDANCE PLACEMENT RAD THERAPY FIELDS: CPT | Mod: 26,,, | Performed by: STUDENT IN AN ORGANIZED HEALTH CARE EDUCATION/TRAINING PROGRAM

## 2022-04-08 PROCEDURE — 77014 HC CT GUIDANCE RADIATION THERAPY FLDS PLACEMENT: CPT | Mod: TC,PN | Performed by: STUDENT IN AN ORGANIZED HEALTH CARE EDUCATION/TRAINING PROGRAM

## 2022-04-08 PROCEDURE — 77014 PR  CT GUIDANCE PLACEMENT RAD THERAPY FIELDS: ICD-10-PCS | Mod: 26,,, | Performed by: STUDENT IN AN ORGANIZED HEALTH CARE EDUCATION/TRAINING PROGRAM

## 2022-04-08 RX ORDER — METHADONE HYDROCHLORIDE 5 MG/1
5 TABLET ORAL EVERY 12 HOURS
Qty: 30 TABLET | Refills: 0 | Status: SHIPPED | OUTPATIENT
Start: 2022-04-08 | End: 2022-04-27 | Stop reason: SDUPTHER

## 2022-04-08 RX ORDER — OXYCODONE HYDROCHLORIDE 10 MG/1
10 TABLET ORAL EVERY 4 HOURS PRN
Qty: 100 TABLET | Refills: 0 | Status: SHIPPED | OUTPATIENT
Start: 2022-04-08 | End: 2022-04-20

## 2022-04-11 PROCEDURE — 77014 HC CT GUIDANCE RADIATION THERAPY FLDS PLACEMENT: CPT | Mod: TC,PN | Performed by: STUDENT IN AN ORGANIZED HEALTH CARE EDUCATION/TRAINING PROGRAM

## 2022-04-11 PROCEDURE — 77386 HC IMRT, COMPLEX: CPT | Mod: PN | Performed by: STUDENT IN AN ORGANIZED HEALTH CARE EDUCATION/TRAINING PROGRAM

## 2022-04-11 PROCEDURE — 77336 RADIATION PHYSICS CONSULT: CPT | Mod: PN | Performed by: STUDENT IN AN ORGANIZED HEALTH CARE EDUCATION/TRAINING PROGRAM

## 2022-04-11 PROCEDURE — 77014 PR  CT GUIDANCE PLACEMENT RAD THERAPY FIELDS: ICD-10-PCS | Mod: 26,,, | Performed by: STUDENT IN AN ORGANIZED HEALTH CARE EDUCATION/TRAINING PROGRAM

## 2022-04-11 PROCEDURE — 77014 PR  CT GUIDANCE PLACEMENT RAD THERAPY FIELDS: CPT | Mod: 26,,, | Performed by: STUDENT IN AN ORGANIZED HEALTH CARE EDUCATION/TRAINING PROGRAM

## 2022-04-12 ENCOUNTER — DOCUMENTATION ONLY (OUTPATIENT)
Dept: RADIATION ONCOLOGY | Facility: CLINIC | Age: 72
End: 2022-04-12
Payer: MEDICARE

## 2022-04-12 PROCEDURE — 77386 HC IMRT, COMPLEX: CPT | Mod: PN | Performed by: STUDENT IN AN ORGANIZED HEALTH CARE EDUCATION/TRAINING PROGRAM

## 2022-04-12 PROCEDURE — 77014 PR  CT GUIDANCE PLACEMENT RAD THERAPY FIELDS: ICD-10-PCS | Mod: 26,,, | Performed by: STUDENT IN AN ORGANIZED HEALTH CARE EDUCATION/TRAINING PROGRAM

## 2022-04-12 PROCEDURE — 77014 PR  CT GUIDANCE PLACEMENT RAD THERAPY FIELDS: CPT | Mod: 26,,, | Performed by: STUDENT IN AN ORGANIZED HEALTH CARE EDUCATION/TRAINING PROGRAM

## 2022-04-12 PROCEDURE — 77014 HC CT GUIDANCE RADIATION THERAPY FLDS PLACEMENT: CPT | Mod: TC,PN | Performed by: STUDENT IN AN ORGANIZED HEALTH CARE EDUCATION/TRAINING PROGRAM

## 2022-04-12 NOTE — PROGRESS NOTES
Established Patient - Audio Only Telehealth Visit     The patient location is: at home at [id number] [place] [address] [place]. [place] [id number]  The chief complaint leading to consultation is: adjustment to Lung Cancer  Visit type: Virtual visit with audio only (telephone)  Total time spent with patient: 20       The reason for the audio only service rather than synchronous audio and video virtual visit was related to technical difficulties or patient preference/necessity.     Each patient to whom I provide medical services by telemedicine is:  (1) informed of the relationship between the physician and patient and the respective role of any other health care provider with respect to management of the patient; and (2) notified that they may decline to receive medical services by telemedicine and may withdraw from such care at any time. Patient verbally consented to receive this service via voice-only telephone call.    PSYCHO-ONCOLOGY NOTE/ Individual Psychotherapy     Date: 4/13/2022   Site:  [facility]      Therapeutic Intervention: Met with patient.  Outpatient - Behavior modifying psychotherapy 30 min - CPT code 07010 and Outpatient - Supportive psychotherapy 30 min - CPT Code 89854      Patient was last seen by me on 3/11/2022    Problem list  Patient Active Problem List   Diagnosis    Opiate overdose    Acute kidney injury    Encephalopathy    Nausea and vomiting    Cervical radiculopathy    Type 2 diabetes mellitus without complication    Leukocytosis    Viral illness    Mass of upper lobe of left lung    Cigarette smoker    Cancer of upper lobe of left lung    Malignant neoplasm of upper lobe of left lung    Malignant neoplasm of lung    Immunodeficiency due to drugs    Secondary malignancy of anterior mediastinum    Cancer associated pain    Palliative care by specialist       Chief complaint/reason for encounter: anxiety   Met with patient to evaluate psychosocial adaptation to diagnosis and treatment  of Lung Cancer    Current Medications  Current Outpatient Medications   Medication    albuterol (PROVENTIL/VENTOLIN HFA) 90 mcg/actuation inhaler    BREO ELLIPTA 100-25 mcg/dose diskus inhaler    buprenorphine HCL (SUBUTEX) 8 mg Subl    diclofenac (VOLTAREN) 75 MG EC tablet    duke's soln (benadryl 30 mL, mylanta 30 mL, LIDOcaine 30 mL, nystatin 30 mL) 120mL    FLUoxetine 10 MG capsule    FLUoxetine 20 MG capsule    fluticasone furoate-vilanteroL (BREO ELLIPTA) 100-25 mcg/dose diskus inhaler    fluticasone furoate-vilanteroL (BREO ELLIPTA) 100-25 mcg/dose diskus inhaler    gabapentin (NEURONTIN) 800 MG tablet    HYDROcodone-acetaminophen (NORCO) 5-325 mg per tablet    JARDIANCE 25 mg tablet    LIDOcaine (LIDODERM) 5 %    LIDOcaine-prilocaine (EMLA) cream    lovastatin (MEVACOR) 10 MG tablet    magic mouthwash diphen/antac/lidoc/nysta    metformin (GLUCOPHAGE) 1000 MG tablet    methadone (DOLOPHINE) 5 MG tablet    naloxone (NARCAN) 4 mg/actuation Spry    nystatin (MYCOSTATIN) 100,000 unit/mL suspension    ondansetron (ZOFRAN) 8 MG tablet    oxyCODONE (ROXICODONE) 10 mg Tab immediate release tablet    oxyCODONE (ROXICODONE) 5 MG immediate release tablet    predniSONE (DELTASONE) 5 MG tablet    promethazine (PHENERGAN) 12.5 MG Tab    promethazine (PHENERGAN) 25 MG tablet    traZODone (DESYREL) 50 MG tablet    water Liqd 150 mL with MILK OF MAGNESIA 400 mg/5 mL Susp 400 mg, diphenhydrAMINE 12.5 mg/5 mL Elix 60 mg, nystatin 100,000 unit/mL Susp 500,000 Units     No current facility-administered medications for this visit.       Objective:  Teresa Earl answered the phone promptly for the session. The patient was fully cooperative throughout the session.  Appearance:N/A audio only session  Behavior/Cooperation: friendly and cooperative  Speech: normal in rate, volume, and tone and appropriate quality, quantity and organization of sentences  Mood: Calm and steady  Affect: mood congruent,  "appropriate and positive  Thought Process: goal-directed, logical  Thought Content: normal,  No delusions or paranoia; did not appear to be responding to internal stimuli during the session  Orientation: grossly intact  Memory: grossly intact  Attention Span/Concentration: Attends to session without distraction  Fund of Knowledge: average  Estimate of Intelligence: average from verbal skills and history  Cognition: grossly intact  Insight: patient has awareness of illness; good insight into own behavior and behavior of others  Judgment: the patient's behavior is adequate to circumstances    Interval history and content of current session: Patient shared that she is feeling "good" overall.  Discussed current adaptation to disease and treatment status. Reports to be coping well with her treatment course.She denied any significant side effects from treatment. She did note that she has felt more tired recently.  She noted that her children and a few close friends have been good supports for her and that her children are adequately coping at this time. Evaluated cognitive response, paying particular attention to negative intrusive thoughts of a persistent and detrimental nature. Patient denied any feelings of diminished mood. She noted some anxiety about her mother, but denied any anxiety about her cancer treatment. Patient reported that her son has management her mother's care and that patient's mother has recently moved into an assisted-living facility. Patient stated she is happy with the care her mother gets at this time. She noted that she got a good report from one of her doctors yesterday that the cancer appears to be shrinking in size and is happy about it. Examined proactive behaviors that may be implemented to minimize or ameliorate psychosocial stressors secondary to diagnosis and treatment. Patient acknowledged that she has not been practicing deep breathing exercise as often recently because she feels like " she has not needed it. Patient reported that she communicates with her supports daily but that she still does not go in public much due to concerns of being immunocompromised. Discussed with patient the benefits of pleasant events scheduling and recommended patient engage outside the home at a level she is comfortable with at this time. Patient noted that she does not need to schedule a follow-up session at this time. Patient will terminate psychotherapy sessions at this time but will continue to engage in her cancer treatment. Patient stated she would schedule a session in the future if needed.     Risk parameters:   Patient reports no suicidal ideation  Patient reports no homicidal ideation  Patient reports no self-injurious behavior  Patient reports no violent behavior   Safety needs:  None at this time      Verbal deficits: None     Patient's response to intervention:The patient's response to intervention is accepting, motivated.     Progress toward goals and other mental status changes:  The patient's progress toward goals is good.      Progress to date:Positive and increased in feelings of relaxation      Goals from last visit: Successful in seeing an increase in feelings of relaxation        Patient Strengths: verbal, intelligent, good social support, good insight, and commitment to wellness      Treatment Plan:Patient will continue with Cancer treatment.      Behavioral goals:      Stress management: Continue working on increasing pleasant event scheduling for positive mood      Return to clinic: No future appointment scheduled with me at this time     Length of Service (minutes direct contact): 20 minutes    Diagnosis:     ICD-10-CM ICD-9-CM   1. Adjustment disorder with anxiety  F43.22 309.24           Ana Cristina Pickens License #[id number]             Ana Cristina Contreras License #[id number]  [place] License #[id number]           This service was not originating from a related E/M service provided within  the previous 7 days nor will  to an E/M service or procedure within the next 24 hours or my soonest available appointment.  Prevailing standard of care was able to be met in this audio-only visit.

## 2022-04-12 NOTE — PLAN OF CARE
.Pt here for 22 of 30 out patient radiation treatment . Pt no complaints and in stable condition.

## 2022-04-13 ENCOUNTER — OFFICE VISIT (OUTPATIENT)
Dept: PSYCHIATRY | Facility: CLINIC | Age: 72
End: 2022-04-13
Payer: MEDICARE

## 2022-04-13 DIAGNOSIS — F43.22 ADJUSTMENT DISORDER WITH ANXIETY: Primary | ICD-10-CM

## 2022-04-13 PROCEDURE — 77014 PR  CT GUIDANCE PLACEMENT RAD THERAPY FIELDS: CPT | Mod: 26,,, | Performed by: STUDENT IN AN ORGANIZED HEALTH CARE EDUCATION/TRAINING PROGRAM

## 2022-04-13 PROCEDURE — 90832 PR PSYCHOTHERAPY W/PATIENT, 30 MIN: ICD-10-PCS | Mod: FQ,95,, | Performed by: CASE MANAGER/CARE COORDINATOR

## 2022-04-13 PROCEDURE — 77014 PR  CT GUIDANCE PLACEMENT RAD THERAPY FIELDS: ICD-10-PCS | Mod: 26,,, | Performed by: STUDENT IN AN ORGANIZED HEALTH CARE EDUCATION/TRAINING PROGRAM

## 2022-04-13 PROCEDURE — 77014 HC CT GUIDANCE RADIATION THERAPY FLDS PLACEMENT: CPT | Mod: TC,PN | Performed by: STUDENT IN AN ORGANIZED HEALTH CARE EDUCATION/TRAINING PROGRAM

## 2022-04-13 PROCEDURE — 90832 PSYTX W PT 30 MINUTES: CPT | Mod: FQ,95,, | Performed by: CASE MANAGER/CARE COORDINATOR

## 2022-04-13 PROCEDURE — 77386 HC IMRT, COMPLEX: CPT | Mod: PN | Performed by: STUDENT IN AN ORGANIZED HEALTH CARE EDUCATION/TRAINING PROGRAM

## 2022-04-14 ENCOUNTER — OFFICE VISIT (OUTPATIENT)
Dept: HEMATOLOGY/ONCOLOGY | Facility: CLINIC | Age: 72
End: 2022-04-14
Payer: MEDICARE

## 2022-04-14 ENCOUNTER — INFUSION (OUTPATIENT)
Dept: INFUSION THERAPY | Facility: HOSPITAL | Age: 72
End: 2022-04-14
Payer: MEDICARE

## 2022-04-14 ENCOUNTER — DOCUMENTATION ONLY (OUTPATIENT)
Dept: INFUSION THERAPY | Facility: HOSPITAL | Age: 72
End: 2022-04-14
Payer: MEDICARE

## 2022-04-14 VITALS
OXYGEN SATURATION: 95 % | SYSTOLIC BLOOD PRESSURE: 115 MMHG | TEMPERATURE: 96 F | BODY MASS INDEX: 20.68 KG/M2 | HEART RATE: 82 BPM | WEIGHT: 136.44 LBS | RESPIRATION RATE: 17 BRPM | HEIGHT: 68 IN | DIASTOLIC BLOOD PRESSURE: 61 MMHG

## 2022-04-14 VITALS
SYSTOLIC BLOOD PRESSURE: 103 MMHG | DIASTOLIC BLOOD PRESSURE: 68 MMHG | HEART RATE: 101 BPM | WEIGHT: 136.44 LBS | OXYGEN SATURATION: 95 % | RESPIRATION RATE: 16 BRPM | BODY MASS INDEX: 20.68 KG/M2 | TEMPERATURE: 96 F | HEIGHT: 68 IN

## 2022-04-14 DIAGNOSIS — E11.9 TYPE 2 DIABETES MELLITUS WITHOUT COMPLICATION, WITHOUT LONG-TERM CURRENT USE OF INSULIN: ICD-10-CM

## 2022-04-14 DIAGNOSIS — C78.1 SECONDARY MALIGNANCY OF ANTERIOR MEDIASTINUM: ICD-10-CM

## 2022-04-14 DIAGNOSIS — D84.821 IMMUNODEFICIENCY DUE TO DRUGS: ICD-10-CM

## 2022-04-14 DIAGNOSIS — C34.12 MALIGNANT NEOPLASM OF UPPER LOBE OF LEFT LUNG: Primary | ICD-10-CM

## 2022-04-14 DIAGNOSIS — Z79.899 IMMUNODEFICIENCY DUE TO DRUGS: ICD-10-CM

## 2022-04-14 DIAGNOSIS — N17.9 ACUTE KIDNEY INJURY: ICD-10-CM

## 2022-04-14 DIAGNOSIS — C34.12 CANCER OF UPPER LOBE OF LEFT LUNG: Primary | ICD-10-CM

## 2022-04-14 PROCEDURE — 77014 PR  CT GUIDANCE PLACEMENT RAD THERAPY FIELDS: CPT | Mod: 26,,, | Performed by: STUDENT IN AN ORGANIZED HEALTH CARE EDUCATION/TRAINING PROGRAM

## 2022-04-14 PROCEDURE — 96417 CHEMO IV INFUS EACH ADDL SEQ: CPT | Mod: PN

## 2022-04-14 PROCEDURE — 99214 OFFICE O/P EST MOD 30 MIN: CPT | Mod: S$PBB,,,

## 2022-04-14 PROCEDURE — 99999 PR PBB SHADOW E&M-EST. PATIENT-LVL III: CPT | Mod: PBBFAC,,,

## 2022-04-14 PROCEDURE — 99999 PR PBB SHADOW E&M-EST. PATIENT-LVL III: ICD-10-PCS | Mod: PBBFAC,,,

## 2022-04-14 PROCEDURE — 25000003 PHARM REV CODE 250: Mod: PN

## 2022-04-14 PROCEDURE — 77386 HC IMRT, COMPLEX: CPT | Mod: PN | Performed by: STUDENT IN AN ORGANIZED HEALTH CARE EDUCATION/TRAINING PROGRAM

## 2022-04-14 PROCEDURE — 96375 TX/PRO/DX INJ NEW DRUG ADDON: CPT | Mod: PN

## 2022-04-14 PROCEDURE — 77014 PR  CT GUIDANCE PLACEMENT RAD THERAPY FIELDS: ICD-10-PCS | Mod: 26,,, | Performed by: STUDENT IN AN ORGANIZED HEALTH CARE EDUCATION/TRAINING PROGRAM

## 2022-04-14 PROCEDURE — 77014 HC CT GUIDANCE RADIATION THERAPY FLDS PLACEMENT: CPT | Mod: TC,PN | Performed by: STUDENT IN AN ORGANIZED HEALTH CARE EDUCATION/TRAINING PROGRAM

## 2022-04-14 PROCEDURE — 96367 TX/PROPH/DG ADDL SEQ IV INF: CPT | Mod: PN

## 2022-04-14 PROCEDURE — 63600175 PHARM REV CODE 636 W HCPCS: Mod: PN

## 2022-04-14 PROCEDURE — 96368 THER/DIAG CONCURRENT INF: CPT | Mod: PN

## 2022-04-14 PROCEDURE — 99213 OFFICE O/P EST LOW 20 MIN: CPT | Mod: PBBFAC,PN,25

## 2022-04-14 PROCEDURE — 96413 CHEMO IV INFUSION 1 HR: CPT | Mod: PN

## 2022-04-14 PROCEDURE — 99214 PR OFFICE/OUTPT VISIT, EST, LEVL IV, 30-39 MIN: ICD-10-PCS | Mod: S$PBB,,,

## 2022-04-14 RX ORDER — DIPHENHYDRAMINE HYDROCHLORIDE 50 MG/ML
50 INJECTION INTRAMUSCULAR; INTRAVENOUS ONCE AS NEEDED
Status: DISCONTINUED | OUTPATIENT
Start: 2022-04-14 | End: 2022-04-14 | Stop reason: HOSPADM

## 2022-04-14 RX ORDER — EPINEPHRINE 0.3 MG/.3ML
0.3 INJECTION SUBCUTANEOUS ONCE AS NEEDED
Status: DISCONTINUED | OUTPATIENT
Start: 2022-04-14 | End: 2022-04-14 | Stop reason: HOSPADM

## 2022-04-14 RX ORDER — EPINEPHRINE 0.3 MG/.3ML
0.3 INJECTION SUBCUTANEOUS ONCE AS NEEDED
Status: CANCELLED | OUTPATIENT
Start: 2022-04-14

## 2022-04-14 RX ORDER — HEPARIN 100 UNIT/ML
500 SYRINGE INTRAVENOUS
Status: CANCELLED | OUTPATIENT
Start: 2022-04-14

## 2022-04-14 RX ORDER — FAMOTIDINE 10 MG/ML
20 INJECTION INTRAVENOUS
Status: CANCELLED | OUTPATIENT
Start: 2022-04-14

## 2022-04-14 RX ORDER — SODIUM CHLORIDE 9 MG/ML
1000 INJECTION, SOLUTION INTRAVENOUS
Status: COMPLETED | OUTPATIENT
Start: 2022-04-14 | End: 2022-04-14

## 2022-04-14 RX ORDER — SODIUM CHLORIDE 0.9 % (FLUSH) 0.9 %
10 SYRINGE (ML) INJECTION
Status: CANCELLED | OUTPATIENT
Start: 2022-04-14

## 2022-04-14 RX ORDER — FAMOTIDINE 10 MG/ML
20 INJECTION INTRAVENOUS
Status: COMPLETED | OUTPATIENT
Start: 2022-04-14 | End: 2022-04-14

## 2022-04-14 RX ORDER — SODIUM CHLORIDE 0.9 % (FLUSH) 0.9 %
10 SYRINGE (ML) INJECTION
Status: DISCONTINUED | OUTPATIENT
Start: 2022-04-14 | End: 2022-04-14 | Stop reason: HOSPADM

## 2022-04-14 RX ORDER — DIPHENHYDRAMINE HYDROCHLORIDE 50 MG/ML
50 INJECTION INTRAMUSCULAR; INTRAVENOUS ONCE AS NEEDED
Status: CANCELLED | OUTPATIENT
Start: 2022-04-14

## 2022-04-14 RX ORDER — HEPARIN 100 UNIT/ML
500 SYRINGE INTRAVENOUS
Status: DISCONTINUED | OUTPATIENT
Start: 2022-04-14 | End: 2022-04-14 | Stop reason: HOSPADM

## 2022-04-14 RX ADMIN — PALONOSETRON: 0.05 INJECTION, SOLUTION INTRAVENOUS at 12:04

## 2022-04-14 RX ADMIN — SODIUM CHLORIDE 1000 ML: 0.9 INJECTION, SOLUTION INTRAVENOUS at 10:04

## 2022-04-14 RX ADMIN — SODIUM CHLORIDE: 0.9 INJECTION, SOLUTION INTRAVENOUS at 10:04

## 2022-04-14 RX ADMIN — FAMOTIDINE 20 MG: 10 INJECTION INTRAVENOUS at 11:04

## 2022-04-14 RX ADMIN — CARBOPLATIN 145 MG: 10 INJECTION, SOLUTION INTRAVENOUS at 01:04

## 2022-04-14 RX ADMIN — DIPHENHYDRAMINE HYDROCHLORIDE 50 MG: 50 INJECTION INTRAMUSCULAR; INTRAVENOUS at 11:04

## 2022-04-14 RX ADMIN — PACLITAXEL 78 MG: 6 INJECTION, SOLUTION, CONCENTRATE INTRAVENOUS at 12:04

## 2022-04-14 RX ADMIN — Medication 500 UNITS: at 02:04

## 2022-04-14 NOTE — PROGRESS NOTES
"PATIENT: Teresa Earl  MRN: [id number]  DATE: 4/14/2022      Diagnosis:   1. Cancer of upper lobe of left lung    2. Secondary malignancy of anterior mediastinum    3. Immunodeficiency due to drugs    4. Acute kidney injury    5. Type 2 diabetes mellitus without complication, without long-term current use of insulin        Chief Complaint: Follow-up      Subjective:   HPI: Ms. Earl is a 71 y.o. female with Diabetes mellitus type 2, HTN, cervical radiculopathy  who is known to Dr. Styles with diagnosis of squamous cell carcinoma of the lung. She is here for consideration of C6 weekly Carbo/Taxol concurrent with XRT. She is accompanied by her daughter today.    Tells me that her appetite is great, weight is relatively stable from last visit. Having difficulty managing her blood sugars at times. Endorses some fatigue. Has peripheral neuropathy that has not worsened with chemotherapy.   Denies HA, CP, N/V, abd pain, diarrhea, constipation, dysuria, changes in urinary frequency, swelling, rashes. Has had no fevers, chills, no new lumps or bumps.     Prior History:  Underwent a chest xray in September 2022 prior to a discectomy and that showed a lung mass.      She then underwent CT chest also on September 2022 which showed spiculated mass in anterior segment GRACIELA, multiple borderline/enlarged mediastinal lymph nodes.     PFT's on 11/5/21 revealed FVC 1.96 (61%), FEV1 1.47 (60%), TLC 73%, DLCO 9.18 (47%)     PET/CT on 11/11/2022 reveals  FDG avid, spiculated pleural based mass in the GRACIELA extending into the anterior mediastinum, measuring 3.5cm. SUV 8.5. no enlarged or FDG avid lymphadenopathy in chest. No distant mets but FDG avid L parotid lesion     Navigational bronch on 12/13/2021 revealed "no endobronchial disease medial GRACIELA sampled. EBUS for station 4L, 7, 11. Path: GRACIELA suspicious but not diagnostic for malignancy. 4L, 7, 11L negative for malignancy"     On 1/25/22: he met with CT surgery and is felt not to be a " "candidate for resection due to invasion of mediastinum.     MRI brain on 2/12/2022 revealed  no intracranial mets     CT Guided biopsy on 2/24/2022 reveals  of GRACIELA demonstrating squamous cell carcinoma. Left parotid gland demonstrating pleomorphic adenoma    2/28/2022 repeat CT scans reveal "56 x 39 x 15 mm spiculated pleural-based left upper lobe mass (compatible with biopsy proven diagnosis of squamous cell carcinoma) which abuts and invades the anterior mediastinum with compression and/or invasion of the brachial cephalic vein and intimate association with the left internal mammary vessels and anterior margin of the thoracic aortic arch. Scattered solid pulmonary nodules measuring up to 3 mm in size for which short-term imaging follow-up is recommended. Centrilobular emphysematous lung architecture.  21 x 22 mm hypodense area within the medial segment of the left hepatic lobe abutting the interlobar fissure. While it is possible that this represents focal fatty infiltration, an underlying solitary liver lesion or focus of metastatic disease cannot be excluded.  The patient's recent PET-CT scan showed no abnormal FDG hypermetabolism in this vicinity.  Consider additional investigation with contrast enhanced MRI of the liver. Stable 26 x 39 mm left adnexal hypodensity which show no FDG hypermetabolism at the time of the patient's recent PET-CT scan. Tiny fat containing umbilical hernia. 12 mm short axis right paratracheal lymph node which showed no FDG hypermetabolism at the time of the patient's recent PET-CT scan and which remains unchanged in size since that study"    3/2/2022: follow up MRI abdomen reveals hypo attenuating area on prior CT near the falciform ligament corresponds to an area of focal fatty infiltration. NO suspicious liver lesions are present.    Past Medical History:   Past Medical History:   Diagnosis Date    Acute kidney injury     Cervical radiculopathy     Cigarette smoker 1/26/2022    " Diabetes mellitus     Hypertension     Mass of upper lobe of left lung 1/26/2022    Neuropathy     Rhabdomyolysis        Past Surgical HIstory:   Past Surgical History:   Procedure Laterality Date    BILATERAL TUBAL LIGATION      ENDOBRONCHIAL ULTRASOUND N/A 12/13/2021    Procedure: ENDOBRONCHIAL ULTRASOUN;  Surgeon: William Padgett Jr., DO;  Location: [facility];  Service: Pulmonary;  Laterality: N/A;    HYSTERECTOMY      INSERTION OF TUNNELED CENTRAL VENOUS CATHETER (CVC) WITH SUBCUTANEOUS PORT N/A 3/9/2022    Procedure: [id number];  Surgeon: Jos Solitario MD;  Location: [facility] OR;  Service: General;  Laterality: N/A;    NAVIGATIONAL BRONCHOSCOPY Left 12/13/2021    Procedure: BRONCHOSCOPY, NAVIGATIONAL;  Surgeon: William Padgett Jr., DO;  Location: [facility];  Service: Pulmonary;  Laterality: Left;       Family History:   Family History   Problem Relation Age of Onset    Valvular heart disease Mother        Social History:  reports that she has been smoking cigarettes. She has been smoking about 0.50 packs per day. She has never used smokeless tobacco. She reports that she does not drink alcohol.    Allergies:  Review of patient's allergies indicates:   Allergen Reactions    Codeine Hives and Other (See Comments)     Other reaction(s): Unknown  Other reaction(s): Unknown      Ibuprofen Swelling    Morphine Diarrhea and Other (See Comments)     Other reaction(s): Renal Dysfunctions  Other reaction(s): Renal Dysfunctions         Medications:  Current Outpatient Medications   Medication Sig Dispense Refill    albuterol (PROVENTIL/VENTOLIN HFA) 90 mcg/actuation inhaler Inhale 2 puffs into the lungs every 6 (six) hours as needed for Wheezing or Shortness of Breath. Rescue 1 g 5    BREO ELLIPTA 100-25 mcg/dose diskus inhaler Inhale 2 puffs into the lungs once daily.      buprenorphine HCL (SUBUTEX) 8 mg Subl Place 8 mg under the tongue once daily.      diclofenac (VOLTAREN) 75 MG EC tablet Take  75 mg by mouth 2 (two) times daily.      duke's soln (benadryl 30 mL, mylanta 30 mL, LIDOcaine 30 mL, nystatin 30 mL) 120mL Take 10 mLs by mouth 4 (four) times daily. 120 mL 1    FLUoxetine 20 MG capsule TAKE TWO CAPSULES BY MOUTH EVERY MORNING 30 capsule 2    fluticasone furoate-vilanteroL (BREO ELLIPTA) 100-25 mcg/dose diskus inhaler Inhale 1 puff into the lungs.      fluticasone furoate-vilanteroL (BREO ELLIPTA) 100-25 mcg/dose diskus inhaler Inhale 1 puff into the lungs.      gabapentin (NEURONTIN) 800 MG tablet Take 800 mg by mouth 3 (three) times daily.      JARDIANCE 25 mg tablet Take 1 Tablet (25 mg) by mouth daily in the morning.      LIDOcaine (LIDODERM) 5 % apply ONE PATCH ONCE DAILY FOR 12 hours THEN REMOVE      LIDOcaine-prilocaine (EMLA) cream Apply topically as needed (Prior to port access). 30 g 0    lovastatin (MEVACOR) 10 MG tablet Take 10 mg by mouth nightly.      magic mouthwash diphen/antac/lidoc/nysta Swish and swallow 10 ml by mouth 4 times a day (Patient taking differently: Swish and swallow 10 ml by mouth 4 times a day) 500 mL 3    metformin (GLUCOPHAGE) 1000 MG tablet Take 1,000 mg by mouth 2 (two) times daily with meals.      methadone (DOLOPHINE) 5 MG tablet Take 1 tablet (5 mg total) by mouth every 12 (twelve) hours. 30 tablet 0    naloxone (NARCAN) 4 mg/actuation Spry 4mg by nasal route as needed for opioid overdose; may repeat every 2-3 minutes in alternating nostrils until medical help arrives. Call 911 1 each 11    nystatin (MYCOSTATIN) 100,000 unit/mL suspension Take 4 mLs (400,000 Units total) by mouth 4 (four) times daily. for 10 days 160 mL 0    ondansetron (ZOFRAN) 8 MG tablet Take 1 tablet (8 mg total) by mouth daily as needed for Nausea. 40 tablet 1    oxyCODONE (ROXICODONE) 10 mg Tab immediate release tablet Take 1 tablet (10 mg total) by mouth every 4 (four) hours as needed for Pain. 100 tablet 0    predniSONE (DELTASONE) 5 MG tablet Take 1 tablet (5 mg  total) by mouth once daily. 30 tablet 0    promethazine (PHENERGAN) 25 MG tablet Take 25 mg by mouth every 6 (six) hours as needed.      water Liqd 150 mL with MILK OF MAGNESIA 400 mg/5 mL Susp 400 mg, diphenhydrAMINE 12.5 mg/5 mL Elix 60 mg, nystatin 100,000 unit/mL Susp 500,000 Units Take 10 mLs by mouth 4 (four) times daily.      FLUoxetine 10 MG capsule TAKE TWO CAPSULES BY MOUTH IN THE EVENING (Patient not taking: Reported on 4/14/2022) 30 capsule 2    HYDROcodone-acetaminophen (NORCO) 5-325 mg per tablet Take 1 tablet by mouth every 6 (six) hours as needed for Pain. (Patient not taking: No sig reported) 20 tablet 0    oxyCODONE (ROXICODONE) 5 MG immediate release tablet Take 1 tablet (5 mg total) by mouth every 12 (twelve) hours as needed for Pain. 14 tablet 0    promethazine (PHENERGAN) 12.5 MG Tab Take 1 tablet (12.5 mg total) by mouth every 6 (six) hours as needed (N/V). 30 tablet 0    traZODone (DESYREL) 50 MG tablet Take 50 mg by mouth.       No current facility-administered medications for this visit.     Facility-Administered Medications Ordered in Other Visits   Medication Dose Route Frequency Provider Last Rate Last Admin    diphenhydrAMINE injection 50 mg  50 mg Intravenous Once PRN Chhaya Woodard, NP        EPINEPHrine (EPIPEN) 0.3 mg/0.3 mL pen injection 0.3 mg  0.3 mg Intramuscular Once PRN Chhaya Woodard, NP        heparin, porcine (PF) 100 unit/mL injection flush 500 Units  500 Units Intravenous PRN Chhaya Woodard, NP        hydrocortisone sodium succinate injection 100 mg  100 mg Intravenous Once PRN Chhaya Solomonre, NP        sodium chloride 0.9% flush 10 mL  10 mL Intravenous PRN Chhaya Woodard, NP           Review of Systems   Constitutional: Positive for fatigue. Negative for activity change, chills and fever.   HENT: Negative for mouth sores, sore throat and trouble swallowing.    Eyes: Negative for visual disturbance.   Respiratory: Negative for cough and shortness of breath.   "  Cardiovascular: Negative for chest pain, palpitations and leg swelling.   Gastrointestinal: Negative for abdominal pain, constipation, diarrhea, nausea and vomiting.   Genitourinary: Negative for difficulty urinating, frequency and hematuria.   Musculoskeletal: Negative for arthralgias and joint swelling.   Skin: Negative for rash.   Neurological: Positive for numbness. Negative for light-headedness and headaches.   Hematological: Negative for adenopathy. Does not bruise/bleed easily.       ECOG Performance Status:   ECOG SCORE    0 - Fully active-able to carry on all pre-disease performance without restriction         Objective:      Vitals:   Vitals:    04/14/22 0954   BP: 103/68   BP Location: Left arm   Patient Position: Sitting   BP Method: Medium (Automatic)   Pulse: 101   Resp: 16   Temp: 96.2 °F (35.7 °C)   TempSrc: Temporal   SpO2: 95%   Weight: 61.9 kg (136 lb 7.4 oz)   Height: 5' 8" (1.727 m)     BMI: Body mass index is 20.75 kg/m².    Physical Exam  Vitals reviewed.   Constitutional:       General: She is not in acute distress.     Appearance: She is not diaphoretic.   HENT:      Head: Normocephalic and atraumatic.      Mouth/Throat:      Mouth: Mucous membranes are moist.   Eyes:      General: No scleral icterus.  Cardiovascular:      Rate and Rhythm: Normal rate and regular rhythm.      Heart sounds: Normal heart sounds. No murmur heard.  Pulmonary:      Effort: Pulmonary effort is normal. No respiratory distress.      Breath sounds: No wheezing or rhonchi.   Abdominal:      General: Bowel sounds are normal. There is no distension.      Palpations: Abdomen is soft. There is no mass.      Tenderness: There is no abdominal tenderness. There is no guarding.   Musculoskeletal:         General: No swelling.      Cervical back: No tenderness.      Right lower leg: No edema.      Left lower leg: No edema.   Lymphadenopathy:      Cervical: No cervical adenopathy.   Skin:     General: Skin is warm.      " Findings: No rash.      Comments: Radiation changes to chest wall    Neurological:      Mental Status: She is alert and oriented to person, place, and time.      Gait: Gait normal.   Psychiatric:         Mood and Affect: Mood normal.         Behavior: Behavior normal.         Laboratory Data:  Lab Results   Component Value Date    WBC 6.93 04/14/2022    HGB 12.5 04/14/2022    HCT 39.7 04/14/2022    MCV 96 04/14/2022     (L) 04/14/2022      Assessment:       1. Cancer of upper lobe of left lung    2. Secondary malignancy of anterior mediastinum    3. Immunodeficiency due to drugs    4. Acute kidney injury    5. Type 2 diabetes mellitus without complication, without long-term current use of insulin         Plan:   Cancer off the upper lobe of the lung  -Squamous cell carcinoma of the left upper lobe of the lung, not a candidate for resection due to mediastinal invasion  -Weekly Carbo/Taxol concurrent with XRT, tolerating treatment well  -Proceed with Cycle 6 Carbo/Taxol today; additional 1 L NS to be given with infusion for supportive care  -Follow up in one week with Dr. Styles, labs and plan for Cycle 7 Carbo/Taxol    Immunodeficency due to chemotherapy  -Currently no s/s infection  -Monitor closely while on therapy    DASHA  -Kidney function improving   -Additional 1 L NS today with infusion     Diabetes Mellitus Type 2  -Takes Jardiance, Metformin  -Blood sugars are still elevated but improved from last 3 weeks  -Follows with PCP  -Will monitor    Patient queried and all questions were answered.  Route Chart for Scheduling    Med Onc Chart Routing      Follow up with physician 1 week. MD and planned C7 Carbo/Taxol    Follow up with CLARISSE    Labs CBC, CMP and magnesium   Lab interval:  Prior to visit in one week   Imaging    Pharmacy appointment    Other referrals          Treatment Plan Information   OP NSCLC PACLITAXEL + CARBOPLATIN (AUC) QW + RADIATION   Mere Styles MD   Upcoming Treatment Dates - OP NSCLC  PACLITAXEL + CARBOPLATIN (AUC) QW + RADIATION    No upcoming days in selected categories.

## 2022-04-14 NOTE — PROGRESS NOTES
ONCOLOGY NUTRITION   FOLLOW UP VISIT        Teresa Earl is a 71 y.o. female.  DATE: 04/14/2022        Oncology Diagnosis: lung cancer     REFERRAL FROM:   [] Integrative Oncology   [] Med/Heme Oncology  [] Radiation Oncology  [] Surgical Oncology   [] Infusion Nurse    [x] Routine Nutrition follow up    TREATMENT PLAN:   [] Full treatment plan pending  [] Chemotherapy  [] Immunotherapy  [] Radiation  [x] Concurrent  [] Surgery  [] Treatment complete/post-treatment    ANTHROPOMETRICS:  Wt Readings from Last 10 Encounters:   04/14/22 61.9 kg (136 lb 7.4 oz)   04/14/22 61.9 kg (136 lb 7.4 oz)   04/07/22 61.4 kg (135 lb 5.8 oz)   04/07/22 61.4 kg (135 lb 5.8 oz)   03/31/22 62.6 kg (138 lb 0.1 oz)   03/25/22 63.2 kg (139 lb 5.3 oz)   03/17/22 64.3 kg (141 lb 12.1 oz)   03/10/22 66.1 kg (145 lb 11.6 oz)   03/09/22 66.2 kg (146 lb)   02/24/22 66.3 kg (146 lb 2.6 oz)      Weight Changes: has increased 1 pounds over last 1 week    PHYSICAL EXAM:  Muscle Wasting Observed:  [] No Deficit   [x] Mild Deficit   [] Moderate   [] Severe    INTAKE:  [x] PO Intake [] TF Intake  Current Diet: regular diet  Dietary Patterns:  Eating meals/snacks as tolerated  [x] Oral nutritional supplements: One Glucerna per day    SYMPTOMS/COMPLAINTS:   [x] No nutritional concerns at current  [] Diarrhea                    [] Constipation           [] Nausea                 [] Vomiting                [] Indigestion                [] Reflux              [] Poor Appetite            [] Anorexia                 [] Early Satiety         [] Gas                       [] Bloating                     [] Dry Mouth    [] Mucositis                   [] Mouth Sores           [] Poor Dentition      [] Difficulty chewing  [] Difficulty Swallowing   [] Pain with swallowing [] Change in taste      [] Change in smell   [] Pain (general)       [] Fatigue                      [] Sleep issues    [] Weight loss  [] other, please specify    Nutrition Re-Assessment Risk:  Mild    [x] Labs reviewed   [x] Meds reviewed    Education Provided:   [x] No Education Needed at this time  [] Diarrhea                                              [] Constipation                          [] Nausea/Vomiting  [] Mucositis                [] Dry Mouth    [] Dealing with changes in Taste/Smell  [] Dealing with Poor Appetite   [] Soft/moist Diet      [] Weight Loss/Gain     [] Weight Maintenance                           [] Indigestion/GERD                 [] Gas/Bloating          [] Foods High/ Low in specific nutrients [] Increasing Calories/Protein   [] Milkshake/Smoothies Recipes   [] Nutrition Supplements                        [] Increasing Fluid Intakes         [] Foods that fight cancer    [] Evidence bases resources                 [] Fermented Foods/Probiotics  [] Mediterranean/Plant Based Diet     [] Other, specify                                  [] Handouts provided      [] Samples provided     RD NOTE:  RD met with patient at chairside during infusion treatment. Although patient's weight has been decreasing since initiation of treatment, it has been more relatively stable over the last few weeks. Pt states her daughter Li has been making sure she drinks a high protein shake everyday.    Pt eligible for TFP order- will be provided to patient at the end of infusion today.     RD Goals:   [x] Weight stable                  [] Weight gain                      [] Weight Loss                               [x] Continue adequate Kcal/protein   [] Increase Kcal/protein      [] Adjust Tube-feeding Rx   [] Tolerate Tube Feedings             [] Increase tube feedings to goal     [] Tolerate Supplements     [] Symptom Improvement   [] Understand nutrition Education  [] Offer supportive visits   [] other, please specify    RECOMMENDATIONS:  1. Consume adequate caloric intake to maintain proper hydration  2. Consume adequate fluid to maintain proper hydration    Follow up: Next infusion or PRN  throughout tx    Jessica Gentile, MS, RD, LDN  04/14/2022  12:27 PM

## 2022-04-14 NOTE — PLAN OF CARE
Problem: Adult Inpatient Plan of Care  Goal: Plan of Care Review  Outcome: Ongoing, Progressing  Flowsheets (Taken 4/14/2022 1450)  Plan of Care Reviewed With:   patient   daughter     Patient tolerated treatment well. Port flushed with blood return, heparin locked, and de-accessed. AVS provided and reviewed. Ambulates per self; NAD. Her daughter, Li, accompanied patient upon discharge from infusion center.

## 2022-04-14 NOTE — PLAN OF CARE
Problem: Adult Inpatient Plan of Care  Goal: Patient-Specific Goal (Individualized)  Outcome: Ongoing, Progressing  Flowsheets (Taken 4/14/2022 1039)  Anxieties, Fears or Concerns: none expressed  Individualized Care Needs: recliner, pillow, snacks  Patient-Specific Goals (Include Timeframe): no s/s rxn during txt     Problem: Fatigue  Goal: Improved Activity Tolerance  Intervention: Promote Improved Energy  Flowsheets (Taken 4/14/2022 1039)  Fatigue Management: frequent rest breaks encouraged  Sleep/Rest Enhancement:   natural light exposure provided   noise level reduced  Activity Management:   Ambulated -L4   Ambulated in morrison - L4

## 2022-04-18 ENCOUNTER — DOCUMENTATION ONLY (OUTPATIENT)
Dept: INFUSION THERAPY | Facility: HOSPITAL | Age: 72
End: 2022-04-18
Payer: MEDICARE

## 2022-04-18 NOTE — PROGRESS NOTES
Late entry on April 18, 2022   From encounter with pt on April 14, 2022:      A gas card was provided to this pt.  The pt was in good spirits and reported no other needs at this time.

## 2022-04-19 ENCOUNTER — TELEPHONE (OUTPATIENT)
Dept: PALLIATIVE MEDICINE | Facility: CLINIC | Age: 72
End: 2022-04-19
Payer: MEDICARE

## 2022-04-19 ENCOUNTER — DOCUMENTATION ONLY (OUTPATIENT)
Dept: RADIATION ONCOLOGY | Facility: CLINIC | Age: 72
End: 2022-04-19
Payer: MEDICARE

## 2022-04-19 PROCEDURE — 77014 PR  CT GUIDANCE PLACEMENT RAD THERAPY FIELDS: CPT | Mod: 26,,, | Performed by: STUDENT IN AN ORGANIZED HEALTH CARE EDUCATION/TRAINING PROGRAM

## 2022-04-19 PROCEDURE — 77014 PR  CT GUIDANCE PLACEMENT RAD THERAPY FIELDS: ICD-10-PCS | Mod: 26,,, | Performed by: STUDENT IN AN ORGANIZED HEALTH CARE EDUCATION/TRAINING PROGRAM

## 2022-04-19 PROCEDURE — 77386 HC IMRT, COMPLEX: CPT | Mod: PN | Performed by: STUDENT IN AN ORGANIZED HEALTH CARE EDUCATION/TRAINING PROGRAM

## 2022-04-19 PROCEDURE — 77014 HC CT GUIDANCE RADIATION THERAPY FLDS PLACEMENT: CPT | Mod: TC,PN | Performed by: STUDENT IN AN ORGANIZED HEALTH CARE EDUCATION/TRAINING PROGRAM

## 2022-04-19 NOTE — PLAN OF CARE
.Pt here for 25 of 30. out patient radiation treatment . Pt no complaints and in stable condition.

## 2022-04-20 ENCOUNTER — OFFICE VISIT (OUTPATIENT)
Dept: PALLIATIVE MEDICINE | Facility: CLINIC | Age: 72
End: 2022-04-20
Payer: MEDICARE

## 2022-04-20 DIAGNOSIS — C78.1 SECONDARY MALIGNANCY OF ANTERIOR MEDIASTINUM: ICD-10-CM

## 2022-04-20 DIAGNOSIS — M54.12 CERVICAL RADICULOPATHY: ICD-10-CM

## 2022-04-20 DIAGNOSIS — G47.00 INSOMNIA, UNSPECIFIED TYPE: ICD-10-CM

## 2022-04-20 DIAGNOSIS — R11.0 NAUSEA: ICD-10-CM

## 2022-04-20 DIAGNOSIS — Z71.89 ADVANCED CARE PLANNING/COUNSELING DISCUSSION: ICD-10-CM

## 2022-04-20 DIAGNOSIS — Z51.5 ENCOUNTER FOR PALLIATIVE CARE: ICD-10-CM

## 2022-04-20 DIAGNOSIS — G89.3 CANCER ASSOCIATED PAIN: ICD-10-CM

## 2022-04-20 DIAGNOSIS — C34.12 CANCER OF UPPER LOBE OF LEFT LUNG: Primary | ICD-10-CM

## 2022-04-20 PROCEDURE — 77014 PR  CT GUIDANCE PLACEMENT RAD THERAPY FIELDS: CPT | Mod: 26,,, | Performed by: STUDENT IN AN ORGANIZED HEALTH CARE EDUCATION/TRAINING PROGRAM

## 2022-04-20 PROCEDURE — 77336 RADIATION PHYSICS CONSULT: CPT | Mod: PN | Performed by: STUDENT IN AN ORGANIZED HEALTH CARE EDUCATION/TRAINING PROGRAM

## 2022-04-20 PROCEDURE — 77386 HC IMRT, COMPLEX: CPT | Mod: PN | Performed by: STUDENT IN AN ORGANIZED HEALTH CARE EDUCATION/TRAINING PROGRAM

## 2022-04-20 PROCEDURE — 77014 PR  CT GUIDANCE PLACEMENT RAD THERAPY FIELDS: ICD-10-PCS | Mod: 26,,, | Performed by: STUDENT IN AN ORGANIZED HEALTH CARE EDUCATION/TRAINING PROGRAM

## 2022-04-20 PROCEDURE — 99215 PR OFFICE/OUTPT VISIT, EST, LEVL V, 40-54 MIN: ICD-10-PCS | Mod: 95,,, | Performed by: STUDENT IN AN ORGANIZED HEALTH CARE EDUCATION/TRAINING PROGRAM

## 2022-04-20 PROCEDURE — 99215 OFFICE O/P EST HI 40 MIN: CPT | Mod: 95,,, | Performed by: STUDENT IN AN ORGANIZED HEALTH CARE EDUCATION/TRAINING PROGRAM

## 2022-04-20 PROCEDURE — 77014 HC CT GUIDANCE RADIATION THERAPY FLDS PLACEMENT: CPT | Mod: TC,PN | Performed by: STUDENT IN AN ORGANIZED HEALTH CARE EDUCATION/TRAINING PROGRAM

## 2022-04-20 RX ORDER — OXYCODONE HYDROCHLORIDE 20 MG/1
20 TABLET ORAL EVERY 4 HOURS PRN
Qty: 90 TABLET | Refills: 0 | Status: SHIPPED | OUTPATIENT
Start: 2022-04-20 | End: 2022-05-02 | Stop reason: SDUPTHER

## 2022-04-20 RX ORDER — PROCHLORPERAZINE MALEATE 5 MG
5 TABLET ORAL EVERY 6 HOURS PRN
Qty: 60 TABLET | Refills: 2 | Status: SHIPPED | OUTPATIENT
Start: 2022-04-20

## 2022-04-20 RX ORDER — GABAPENTIN 300 MG/1
300 CAPSULE ORAL EVERY MORNING
Qty: 30 CAPSULE | Refills: 0 | Status: SHIPPED | OUTPATIENT
Start: 2022-04-20 | End: 2022-05-20

## 2022-04-20 NOTE — PROGRESS NOTES
[facility]- Palliative Medicine [facility]  Palliative Care   Social Work Visit      Patient Name: Teresa Earl  MRN: [id number]  Palliative Care Provider: Janet Angel mD   Primary Care Physician: Primary Doctor No  Principal Problem: Cancer of upper lobe of left lung     SW accompanied MD during follow up visit with patient and daughter.  Role of SW introduced to patient.  Patient and daughter deny psychosocial concerns at the present time and report patient has great support from her children, nieces and brother. Patient and daughter encouraged to reach out to clinic for support in between visits.  SW remains available to provide support.  SW will continue to follow.    Lisandra Diallo LCSW  Outpatient   Palliative Medicine

## 2022-04-20 NOTE — PROGRESS NOTES
Progress Note  Palliative Care    The [place] location is: [place]  The chief complaint leading to consultation is: symptom management and GOC    Visit type: audiovisual    Face to Face time with patient: 32 minutes    45 minutes of total time spent on the encounter, which includes face to face time and non-face to face time preparing to see the patient (eg, review of tests), Obtaining and/or reviewing separately obtained history, Documenting clinical information in the electronic or other health record, Independently interpreting results (not separately reported) and communicating results to the patient/family/caregiver, or Care coordination (not separately reported).     Each patient to whom he or she provides medical services by telemedicine is:  (1) informed of the relationship between the physician and patient and the respective role of any other health care provider with respect to management of the patient; and (2) notified that he or she may decline to receive medical services by telemedicine and may withdraw from such care at any time.    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 Styles  - currently on disease directed therapy    Encounter for palliative care/Advanced care planning  - patient decisional  - patient accompanied by her daughter on virtual visit today  - ACP documents uploaded into EMR  - HCPOA: Chela Anthony at [phone]  - philosophy of Palliative Medicine reviewed with patient at first visit by Dr. Shah and reviewed again with patient and daughter today  - new patient folder given to patient at first visit with Dr. Shah  - goals: life prolonging  - code status not specifically discussed today    Cancer associated pain  - patient reporting continued moderate to severe pain in her  neck and arms  - patient reports minimal relief with current regimen of oxycodone 10 mg q4h prn and methadone 5 mg BID  - patient reports only having about three hours of pain relief with short acting medication  - patient has not noticed a change since starting methadone. Patient's daughter states she has seen some improvement in patient with addition of long acting medication  - will increase short acting medication to oxycodone 20 mg q4h prn  - increase methadone to 5 mg TID  - EKG on 12/13/21 is 425 msec; will plan for EKG at next visit  - opioid safety sheet in new patient folder for patient to review  - opioid education provided today  - narcan previously prescribed  - will continue to monitor    Cervical radiculopathy  - patient reporting severe pain in her neck and arms and weakness in arms/legs  - patient has been using gabapentin 800 mg qhs (not tid as prescribed) due to side effect of somnolence  - will start gabapentin 300 mg qam and continue gabapentin 800 mg qhs  - patient has been on steroids since last visit with Dr Shah. She is feeling jittery with steroids  - will discuss with oncologist about stopping steroids  - patient has family staying with her around the clock as well to assist patient  - will continue close monitoring    Nausea  - patient reporting moderate nausea  - she obtains minimal benefit from zofran   - she uses phenergan at night due to help her sleep  - will start compazine 5 mg q6h prn to alternate with zofran for better nausea control throughout the day  - patient should try to eat prior to taking pain medications as well to help with nausea  - will continue close monitoring    Insomnia  - patient having some trouble sleeping  - will work on better symptom control as mentioned above  - tips for better sleep in new patient folder for patient to review    Understanding of illness/Prognosis: patient and family have fair understanding of her disease; prognosis is guarded at this  time as she is finishing concurrent chemotherapy and radiation therapy. She reports possibly having immunotherapy after completion of chemoRT.     Goals of care: life prolonging    Follow up: ~ 8 weeks    Patient's encounter and above plan of care discussed with patient's oncologist    SUBJECTIVE:     History of Present Illness:  Patient is a 71 y.o. year old female with DM, HTN, neuropathy, and upper lobe of left lung carcinoma presents to Palliative Medicine for symptom management and GOC. Please see oncology notes for full details of oncologic history and treatment course.     04/20/2022:  [place]  reviewed and summarized:  04/11/2022 Methadone 5 mg Disp: 30 for 15 days  04/09/2022 Oxycodone 10 mg Disp: 100 for 17 days    Today patient states she continues to have severe pain in her neck and arms. Her current regimen is only providing minimal relief at this time. Daughter has noticed mild improvement in pain since starting long acting medication. Patient also feeling jittery from the steroids. She has continued nausea. She is also having some difficulty sleeping. Patient has good support at home with her children and family rotating to stay with her at all times.     03/24/2022:  Initial visit held with Dr. Shah. Please see her note for full details of encounter.    Past Medical History:   Diagnosis Date    Acute kidney injury     Cervical radiculopathy     Cigarette smoker 1/26/2022    Diabetes mellitus     Hypertension     Mass of upper lobe of left lung 1/26/2022    Neuropathy     Rhabdomyolysis      Past Surgical History:   Procedure Laterality Date    BILATERAL TUBAL LIGATION      ENDOBRONCHIAL ULTRASOUND N/A 12/13/2021    Procedure: ENDOBRONCHIAL ULTRASOUN;  Surgeon: William Padgett Jr., DO;  [facility];  Service: Pulmonary;  Laterality: N/A;    HYSTERECTOMY      INSERTION OF TUNNELED CENTRAL VENOUS CATHETER (CVC) WITH SUBCUTANEOUS PORT N/A 3/9/2022    Procedure: [id number]-A-CATH;   Surgeon: Jos Solitario MD;  Location: [facility] OR;  Service: General;  Laterality: N/A;    NAVIGATIONAL BRONCHOSCOPY Left 12/13/2021    Procedure: BRONCHOSCOPY, NAVIGATIONAL;  Surgeon: William Padgett Jr., DO;  Location: [facility];  Service: Pulmonary;  Laterality: Left;     Family History   Problem Relation Age of Onset    Valvular heart disease Mother      Review of patient's allergies indicates:   Allergen Reactions    Codeine Hives and Other (See Comments)     Other reaction(s): Unknown  Other reaction(s): Unknown      Ibuprofen Swelling    Morphine Diarrhea and Other (See Comments)     Other reaction(s): Renal Dysfunctions  Other reaction(s): Renal Dysfunctions         Medications:    Current Outpatient Medications:     albuterol (PROVENTIL/VENTOLIN HFA) 90 mcg/actuation inhaler, Inhale 2 puffs into the lungs every 6 (six) hours as needed for Wheezing or Shortness of Breath. Rescue, Disp: 1 g, Rfl: 5    BREO ELLIPTA 100-25 mcg/dose diskus inhaler, Inhale 2 puffs into the lungs once daily., Disp: , Rfl:     buprenorphine HCL (SUBUTEX) 8 mg Subl, Place 8 mg under the tongue once daily., Disp: , Rfl:     diclofenac (VOLTAREN) 75 MG EC tablet, Take 75 mg by mouth 2 (two) times daily., Disp: , Rfl:     duke's soln (benadryl 30 mL, mylanta 30 mL, LIDOcaine 30 mL, nystatin 30 mL) 120mL, Take 10 mLs by mouth 4 (four) times daily., Disp: 120 mL, Rfl: 1    FLUoxetine 20 MG capsule, TAKE TWO CAPSULES BY MOUTH EVERY MORNING, Disp: 30 capsule, Rfl: 2    fluticasone furoate-vilanteroL (BREO ELLIPTA) 100-25 mcg/dose diskus inhaler, Inhale 1 puff into the lungs., Disp: , Rfl:     fluticasone furoate-vilanteroL (BREO ELLIPTA) 100-25 mcg/dose diskus inhaler, Inhale 1 puff into the lungs., Disp: , Rfl:     gabapentin (NEURONTIN) 300 MG capsule, Take 1 capsule (300 mg total) by mouth every morning., Disp: 30 capsule, Rfl: 0    gabapentin (NEURONTIN) 800 MG tablet, Take 800 mg by mouth 3 (three) times daily., Disp: ,  Rfl:     JARDIANCE 25 mg tablet, Take 1 Tablet (25 mg) by mouth daily in the morning., Disp: , Rfl:     LIDOcaine (LIDODERM) 5 %, apply ONE PATCH ONCE DAILY FOR 12 hours THEN REMOVE, Disp: , Rfl:     LIDOcaine-prilocaine (EMLA) cream, Apply topically as needed (Prior to port access)., Disp: 30 g, Rfl: 0    lovastatin (MEVACOR) 10 MG tablet, Take 10 mg by mouth nightly., Disp: , Rfl:     magic mouthwash diphen/antac/lidoc/nysta, Swish and swallow 10 ml by mouth 4 times a day (Patient taking differently: Swish and swallow 10 ml by mouth 4 times a day), Disp: 500 mL, Rfl: 3    metformin (GLUCOPHAGE) 1000 MG tablet, Take 1,000 mg by mouth 2 (two) times daily with meals., Disp: , Rfl:     methadone (DOLOPHINE) 5 MG tablet, Take 1 tablet (5 mg total) by mouth every 12 (twelve) hours., Disp: 30 tablet, Rfl: 0    naloxone (NARCAN) 4 mg/actuation Spry, 4mg by nasal route as needed for opioid overdose; may repeat every 2-3 minutes in alternating nostrils until medical help arrives. Call 911, Disp: 1 each, Rfl: 11    ondansetron (ZOFRAN) 8 MG tablet, Take 1 tablet (8 mg total) by mouth daily as needed for Nausea., Disp: 40 tablet, Rfl: 1    oxyCODONE (ROXICODONE) 20 mg Tab immediate release tablet, Take 1 tablet (20 mg total) by mouth every 4 (four) hours as needed for Pain., Disp: 90 tablet, Rfl: 0    predniSONE (DELTASONE) 5 MG tablet, Take 1 tablet (5 mg total) by mouth once daily., Disp: 30 tablet, Rfl: 0    prochlorperazine (COMPAZINE) 5 MG tablet, Take 1 tablet (5 mg total) by mouth every 6 (six) hours as needed for Nausea., Disp: 60 tablet, Rfl: 2    promethazine (PHENERGAN) 12.5 MG Tab, Take 1 tablet (12.5 mg total) by mouth every 6 (six) hours as needed (N/V)., Disp: 30 tablet, Rfl: 0    promethazine (PHENERGAN) 25 MG tablet, Take 25 mg by mouth every 6 (six) hours as needed., Disp: , Rfl:     traZODone (DESYREL) 50 MG tablet, Take 50 mg by mouth., Disp: , Rfl:     water Liqd 150 mL with MILK OF  MAGNESIA 400 mg/5 mL Susp 400 mg, diphenhydrAMINE 12.5 mg/5 mL Elix 60 mg, nystatin 100,000 unit/mL Susp 500,000 Units, Take 10 mLs by mouth 4 (four) times daily., Disp: , Rfl:   No current facility-administered medications for this visit.    Facility-Administered Medications Ordered in Other Visits:     alteplase injection 2 mg, 2 mg, Intra-Catheter, PRN, Mere Styles MD    heparin, porcine (PF) 100 unit/mL injection flush 500 Units, 500 Units, Intravenous, PRN, Mere Styles MD    sodium chloride 0.9% flush 10 mL, 10 mL, Intravenous, PRN, Mere Styles MD    OBJECTIVE:       ROS:  Review of Systems   Constitutional: Positive for activity change, appetite change and fatigue.   HENT: Negative.    Eyes: Negative.    Respiratory: Negative.    Cardiovascular: Negative.    Gastrointestinal: Positive for nausea.   Genitourinary: Negative.    Musculoskeletal: Positive for arthralgias, myalgias and neck pain.   Skin: Negative.    Neurological: Positive for weakness.   Psychiatric/Behavioral: Positive for sleep disturbance. Negative for dysphoric mood. The patient is not nervous/anxious.    All other systems reviewed and are negative.      Review of Symptoms    Symptom Assessment (ESAS 0-10 Scale)  Pain:  8  Dyspnea:  0  Anxiety:  0  Nausea:  6  Depression:  0  Anorexia:  5  Fatigue:  3  Insomnia:  7  Restlessness:  0  Agitation:  0     CAM / Delirium:  Negative  Constipation:  Negative  Diarrhea:  Negative    Bowel Management Plan (BMP):  No      Pain Assessment:  OME in 24 hours:  100-160  Location(s): neck    Neck       Location: posterior        Quality: burning, tingling and aching        Quantity: 8/10 in intensity        Chronicity: Onset 3 month(s) ago, unchanged        Aggravating Factors: none        Alleviating Factors: opiates        Associated Symptoms: myalgias    Modified Ximena Scale:  0    ECOG Performance Status ndGndrndanddndend:nd nd2nd Living Arrangements:  Lives with family    Psychosocial/Cultural: Retired;  lives in home with daughter, significant other and grandson/granddaughter.  Three children; Chela GARDNER and José Miguel Earl (son) live in [place], daughter Li in [place]. Patient's mother in [facility] in [place].      Advance Care Planning   Advance Directives:   Living Will: No    LaPOST: No    Do Not Resuscitate Status: No    Medical Power of : Yes    Agent's Name:  Chela Anthony   Agent's Contact Number:  [phone]    Decision Making:  Patient answered questions and Family answered questions          Physical Exam: Limited due to telemedicine visit  Vitals:    Physical Exam  Constitutional:       General: She is not in acute distress.     Appearance: She is not toxic-appearing.   HENT:      Head: Normocephalic and atraumatic.      Right Ear: External ear normal.      Left Ear: External ear normal.      Nose: Nose normal.   Eyes:      General: No scleral icterus.        Right eye: No discharge.         Left eye: No discharge.   Neck:      Comments: Trachea midline  Pulmonary:      Effort: Pulmonary effort is normal. No respiratory distress.   Musculoskeletal:      Cervical back: Normal range of motion.      Comments: Sitting up on her own, able to walk short distance without assistance   Skin:     General: Skin is dry.      Coloration: Skin is not jaundiced.      Findings: No erythema.   Neurological:      General: No focal deficit present.      Mental Status: She is alert and oriented to person, place, and time.   Psychiatric:         Behavior: Behavior normal.         Thought Content: Thought content normal.         Judgment: Judgment normal.         Labs:  CBC:   WBC   Date Value Ref Range Status   04/21/2022 5.46 3.90 - 12.70 K/uL Final     Hemoglobin   Date Value Ref Range Status   04/21/2022 11.2 (L) 12.0 - 16.0 g/dL Final     Hematocrit   Date Value Ref Range Status   04/21/2022 36.2 (L) 37.0 - 48.5 % Final     MCV   Date Value Ref Range Status   04/21/2022 99 (H) 82 - 98 fL Final  "    Platelets   Date Value Ref Range Status   04/21/2022 148 (L) 150 - 450 K/uL Final       LFT:   Lab Results   Component Value Date    AST 15 04/21/2022    ALKPHOS 80 04/21/2022    BILITOT 0.5 04/21/2022       Albumin:   Albumin   Date Value Ref Range Status   04/21/2022 3.5 3.5 - 5.2 g/dL Final     Protein:   Total Protein   Date Value Ref Range Status   04/21/2022 6.8 6.0 - 8.4 g/dL Final       Radiology:I have reviewed all pertinent imaging results/findings within the past 24 hours.    03/15/2022 CTA Chest: "1. No evidence of pulmonary embolism. 2. Interval growth of the squamous cell carcinoma in the anteromedial aspect of the left upper lobe involving the anterior mediastinum and encasing/obstructing the brachiocephalic vein. 3. Interval increase in the size of the wedge-shaped peripheral consolidation in the posterior left lower lobe highly suggestive of pneumonitis. 4. No significant change in the size or appearance of the enlarged precarinal lymph node."    45 minutes of total time spent on the encounter, which includes face to face time and non-face to face time preparing to see the patient (eg, review of tests), Obtaining and/or reviewing separately obtained history, Documenting clinical information in the electronic or other health record, Independently interpreting results (not separately reported) and communicating results to the patient/family/caregiver, or Care coordination (not separately reported).    Encounter occurred during period of COVID-19 emergency. Encounter performed under the concurrent guidelines, limitations and protocols.      Signature: Janet Angel MD    "

## 2022-04-21 ENCOUNTER — INFUSION (OUTPATIENT)
Dept: INFUSION THERAPY | Facility: HOSPITAL | Age: 72
End: 2022-04-21
Attending: INTERNAL MEDICINE
Payer: MEDICARE

## 2022-04-21 ENCOUNTER — OFFICE VISIT (OUTPATIENT)
Dept: HEMATOLOGY/ONCOLOGY | Facility: CLINIC | Age: 72
End: 2022-04-21
Payer: MEDICARE

## 2022-04-21 VITALS
HEART RATE: 89 BPM | BODY MASS INDEX: 20.38 KG/M2 | DIASTOLIC BLOOD PRESSURE: 51 MMHG | TEMPERATURE: 97 F | RESPIRATION RATE: 16 BRPM | HEIGHT: 68 IN | WEIGHT: 134.5 LBS | SYSTOLIC BLOOD PRESSURE: 83 MMHG

## 2022-04-21 VITALS
TEMPERATURE: 97 F | DIASTOLIC BLOOD PRESSURE: 46 MMHG | OXYGEN SATURATION: 96 % | RESPIRATION RATE: 18 BRPM | BODY MASS INDEX: 20.38 KG/M2 | WEIGHT: 134.5 LBS | SYSTOLIC BLOOD PRESSURE: 61 MMHG | HEART RATE: 118 BPM | HEIGHT: 68 IN

## 2022-04-21 DIAGNOSIS — E11.9 TYPE 2 DIABETES MELLITUS WITHOUT COMPLICATION, WITHOUT LONG-TERM CURRENT USE OF INSULIN: ICD-10-CM

## 2022-04-21 DIAGNOSIS — C78.1 SECONDARY MALIGNANCY OF ANTERIOR MEDIASTINUM: ICD-10-CM

## 2022-04-21 DIAGNOSIS — E86.0 DEHYDRATION: ICD-10-CM

## 2022-04-21 DIAGNOSIS — C34.12 CANCER OF UPPER LOBE OF LEFT LUNG: Primary | ICD-10-CM

## 2022-04-21 DIAGNOSIS — E86.0 DEHYDRATION: Primary | ICD-10-CM

## 2022-04-21 PROCEDURE — 99215 OFFICE O/P EST HI 40 MIN: CPT | Mod: PBBFAC,25,PN | Performed by: INTERNAL MEDICINE

## 2022-04-21 PROCEDURE — 25000003 PHARM REV CODE 250: Mod: PN | Performed by: INTERNAL MEDICINE

## 2022-04-21 PROCEDURE — 99215 OFFICE O/P EST HI 40 MIN: CPT | Mod: S$PBB,,, | Performed by: INTERNAL MEDICINE

## 2022-04-21 PROCEDURE — 99215 PR OFFICE/OUTPT VISIT, EST, LEVL V, 40-54 MIN: ICD-10-PCS | Mod: S$PBB,,, | Performed by: INTERNAL MEDICINE

## 2022-04-21 PROCEDURE — 99999 PR PBB SHADOW E&M-EST. PATIENT-LVL V: CPT | Mod: PBBFAC,,, | Performed by: INTERNAL MEDICINE

## 2022-04-21 PROCEDURE — 77386 HC IMRT, COMPLEX: CPT | Mod: PN | Performed by: STUDENT IN AN ORGANIZED HEALTH CARE EDUCATION/TRAINING PROGRAM

## 2022-04-21 PROCEDURE — 77014 HC CT GUIDANCE RADIATION THERAPY FLDS PLACEMENT: CPT | Mod: TC,PN | Performed by: STUDENT IN AN ORGANIZED HEALTH CARE EDUCATION/TRAINING PROGRAM

## 2022-04-21 PROCEDURE — 99999 PR PBB SHADOW E&M-EST. PATIENT-LVL V: ICD-10-PCS | Mod: PBBFAC,,, | Performed by: INTERNAL MEDICINE

## 2022-04-21 PROCEDURE — 77014 PR  CT GUIDANCE PLACEMENT RAD THERAPY FIELDS: ICD-10-PCS | Mod: 26,,, | Performed by: STUDENT IN AN ORGANIZED HEALTH CARE EDUCATION/TRAINING PROGRAM

## 2022-04-21 PROCEDURE — 96361 HYDRATE IV INFUSION ADD-ON: CPT | Mod: PN

## 2022-04-21 PROCEDURE — 63600175 PHARM REV CODE 636 W HCPCS: Mod: PN | Performed by: INTERNAL MEDICINE

## 2022-04-21 PROCEDURE — 77014 PR  CT GUIDANCE PLACEMENT RAD THERAPY FIELDS: CPT | Mod: 26,,, | Performed by: STUDENT IN AN ORGANIZED HEALTH CARE EDUCATION/TRAINING PROGRAM

## 2022-04-21 PROCEDURE — 96360 HYDRATION IV INFUSION INIT: CPT | Mod: PN

## 2022-04-21 RX ORDER — HEPARIN 100 UNIT/ML
500 SYRINGE INTRAVENOUS
Status: CANCELLED | OUTPATIENT
Start: 2022-04-21

## 2022-04-21 RX ORDER — SODIUM CHLORIDE 0.9 % (FLUSH) 0.9 %
10 SYRINGE (ML) INJECTION
Status: DISCONTINUED | OUTPATIENT
Start: 2022-04-21 | End: 2022-04-21 | Stop reason: HOSPADM

## 2022-04-21 RX ORDER — SODIUM CHLORIDE 0.9 % (FLUSH) 0.9 %
10 SYRINGE (ML) INJECTION
Status: CANCELLED | OUTPATIENT
Start: 2022-04-21

## 2022-04-21 RX ORDER — SODIUM CHLORIDE 9 MG/ML
2000 INJECTION, SOLUTION INTRAVENOUS
Status: COMPLETED | OUTPATIENT
Start: 2022-04-21 | End: 2022-04-21

## 2022-04-21 RX ORDER — HEPARIN 100 UNIT/ML
500 SYRINGE INTRAVENOUS
Status: DISCONTINUED | OUTPATIENT
Start: 2022-04-21 | End: 2022-04-21 | Stop reason: HOSPADM

## 2022-04-21 RX ADMIN — SODIUM CHLORIDE 2000 ML: 0.9 INJECTION, SOLUTION INTRAVENOUS at 10:04

## 2022-04-21 RX ADMIN — SODIUM CHLORIDE, PRESERVATIVE FREE 500 UNITS: 5 INJECTION INTRAVENOUS at 12:04

## 2022-04-21 NOTE — PROGRESS NOTES
"Subjective:       Patient ID: Teresa Earl is a 71 y.o. female.    Chief Complaint: Cancer of upper lobe left lung  Mrs. Earl is a 71 year old female who underwent a chest xray in September 2022 prior to a discectomy and that showed a lung mass.      She then underwent CT chest also on September 2022 which showed spiculated mass in anterior segment GRACIELA, multiple borderline/enlarged mediastinal lymph nodes.     PFT's on 11/5/21 revealed FVC 1.96 (61%), FEV1 1.47 (60%), TLC 73%, DLCO 9.18 (47%)     PET/CT on 11/11/2022 reveals  FDG avid, spiculated pleural based mass in the GRACIELA extending into the anterior mediastinum, measuring 3.5cm. SUV 8.5. no enlarged or FDG avid lymphadenopathy in chest. No distant mets but FDG avid L parotid lesion     Navigational bronch on 12/13/2021 revealed "no endobronchial disease medial GRACIELA sampled. EBUS for station 4L, 7, 11. Path: GRACIELA suspicious but not diagnostic for malignancy. 4L, 7, 11L negative for malignancy"     On 1/25/22: he met with CT surgery and is felt not to be a candidate for resection due to invasion of mediastinum.     MRI brain on 2/12/2022 revealed  no intracranial mets     CT Guided biopsy on 2/24/2022 reveals  of GRACIELA demonstrating squamous cell carcinoma. Left parotid gland demonstrating pleomorphic adenomal     Repeat CT scans on 2/28/2022 revealed "56 x 39 x 15 mm spiculated pleural-based left upper lobe mass (compatible with biopsy proven diagnosis of squamous cell carcinoma) which abuts and invades the anterior mediastinum with compression and/or invasion of the brachial cephalic vein and intimate association with the left internal mammary vessels and anterior margin of the thoracic aortic arch. Scattered solid pulmonary nodules measuring up to 3 mm in size for which short-term imaging follow-up is recommended. Centrilobular emphysematous lung architecture.  21 x 22 mm hypodense area within the medial segment of the left hepatic lobe abutting the interlobar " "fissure.  While it is possible that this represents focal fatty infiltration, an underlying solitary liver lesion or focus of metastatic disease cannot be excluded.  The patient's recent PET-CT scan showed no abnormal FDG hypermetabolism in this vicinity.  Consider additional investigation with contrast enhanced MRI of the liver. Stable 26 x 39 mm left adnexal hypodensity which show no FDG hypermetabolism at the time of the patient's recent PET-CT scan. Tiny fat containing umbilical hernia. 12 mm short axis right paratracheal lymph node which showed no FDG hypermetabolism at the time of the patient's recent PET-CT scan and which remains unchanged in size since that study"  Follow-up MRI abdomen on 3/3/2022 reveals "Hypoattenuating area on prior CT near the falciform ligament corresponds to an area of focal fatty infiltration.  No suspicious liver lesions are present"         HPI She is on definitive chemo/RT with weekly Carboplatin and Taxol, completed 6 weekly treatments     She denies any nausea, vomiting, diarrhea, constipation, abdominal pain, weight loss or loss of appetite, chest pain, shortness of breath, leg swelling, fatigue, pain, headache, dizziness, or mood changes. Her ECOG PS is zero.       Review of Systems   Constitutional: Negative for appetite change, fatigue and unexpected weight change.   HENT: Negative for mouth sores.    Eyes: Negative for visual disturbance.   Respiratory: Negative for cough and shortness of breath.    Cardiovascular: Negative for chest pain.   Gastrointestinal: Negative for abdominal pain and diarrhea.   Genitourinary: Negative for frequency.   Musculoskeletal: Negative for back pain.   Integumentary:  Negative for rash.   Neurological: Negative for headaches.   Hematological: Negative for adenopathy.   Psychiatric/Behavioral: The patient is not nervous/anxious.    All other systems reviewed and are negative.        Objective:      Physical Exam  Vitals reviewed. "   Constitutional:       Appearance: She is well-developed.   HENT:      Mouth/Throat:      Pharynx: No oropharyngeal exudate.   Cardiovascular:      Rate and Rhythm: Normal rate.      Heart sounds: Normal heart sounds.   Pulmonary:      Effort: Pulmonary effort is normal.      Breath sounds: Normal breath sounds. No wheezing.   Abdominal:      General: Bowel sounds are normal.      Palpations: Abdomen is soft.      Tenderness: There is no abdominal tenderness.   Musculoskeletal:         General: No tenderness.   Lymphadenopathy:      Cervical: No cervical adenopathy.   Skin:     General: Skin is warm and dry.      Findings: No rash.   Neurological:      Mental Status: She is alert and oriented to person, place, and time.      Coordination: Coordination normal.   Psychiatric:         Thought Content: Thought content normal.         Judgment: Judgment normal.           LABS:  WBC   Date Value Ref Range Status   04/21/2022 5.46 3.90 - 12.70 K/uL Final     Hemoglobin   Date Value Ref Range Status   04/21/2022 11.2 (L) 12.0 - 16.0 g/dL Final     Hematocrit   Date Value Ref Range Status   04/21/2022 36.2 (L) 37.0 - 48.5 % Final     Platelets   Date Value Ref Range Status   04/21/2022 148 (L) 150 - 450 K/uL Final     Gran # (ANC)   Date Value Ref Range Status   04/21/2022 4.9 1.8 - 7.7 K/uL Final     Comment:     The ANC is based on a white cell differential from an   automated cell counter. It has not been microscopically   reviewed for the presence of abnormal cells. Clinical   correlation is required.         Chemistry        Component Value Date/Time     (L) 04/21/2022 0739    K 4.3 04/21/2022 0739    CL 98 04/21/2022 0739    CO2 22 (L) 04/21/2022 0739    BUN 23 04/21/2022 0739    CREATININE 1.7 (H) 04/21/2022 0739     (H) 04/21/2022 0739        Component Value Date/Time    CALCIUM 8.8 04/21/2022 0739    ALKPHOS 80 04/21/2022 0739    AST 15 04/21/2022 0739    ALT 12 04/21/2022 0739    BILITOT 0.5  2022 0739    ESTGFRAFRICA 34 (A) 2022 0739    EGFRNONAA 30 (A) 2022 0739        Ma.4     Assessment:       Problem List Items Addressed This Visit     Cancer of upper lobe of left lung - Primary    Relevant Orders    CT Chest Abdomen Pelvis With Contrast    Dehydration    Secondary malignancy of anterior mediastinum    Type 2 diabetes mellitus without complication          Plan:       Route Chart for Scheduling    Med Onc Chart Routing      Follow up with physician . Schedule CBC,CMP, CT scans around 5/10/2022 and see me. Please get auth for Imfinzi    Follow up with [facility]    Labs    Imaging    Pharmacy appointment    Other referrals          Treatment Plan Information   OP DURVALUMAB 10 MG/KG Q2W   Mere Styles MD   Upcoming Treatment Dates - OP DURVALUMAB 10 MG/KG Q2W    5/10/2022       Chemotherapy       durvalumab (IMFINZI) 610 mg in sodium chloride 0.9% 262.2 mL chemo infusion  2022       Chemotherapy       durvalumab (IMFINZI) 610 mg in sodium chloride 0.9% 262.2 mL chemo infusion  2022       Chemotherapy       durvalumab (IMFINZI) 610 mg in sodium chloride 0.9% 262.2 mL chemo infusion  2022       Chemotherapy       durvalumab (IMFINZI) 610 mg in sodium chloride 0.9% 262.2 mL chemo infusion    Supportive Plan Information  OP SHELL SUPPORTIVE   Mere Styles MD   Upcoming Treatment Dates - OP SHELL SUPPORTIVE    No upcoming days in selected categories.      She has completed 6 weekly doses of chemo. She will complete RT next week. Held chemo today due to low blood pressure and orthostasis, received IV fluids which resolved orthostasis.  Will plan on restaging scans in 2-3 weeks and start Imfinzi     Above care plan was discussed with patient and accompanying daughter and all questions were addressed to their satisfaction

## 2022-04-21 NOTE — PLAN OF CARE
DISCONTINUE ON PATHWAY REGIMEN - Non-Small Cell Lung    [id number]        Paclitaxel (Taxol)       Carboplatin (Paraplatin)           Additional Orders: * All AUC calculations intended to be used in [place]   formula    **Always confirm dose/schedule in your pharmacy ordering system**    REASON: Continuation Of Treatment  PRIOR TREATMENT: [id number]  TREATMENT RESPONSE: Stable Disease (SD)    START ON PATHWAY REGIMEN - Non-Small Cell Lung    [id number]        Durvalumab (Imfinzi)           Additional Orders: For patients weighing less than 30 kg, only   weight-based dosing is recommended for durvalumab. For patients weighing greater   than or equal to 30 kg, weight-based or flat dosing can be used based on   indication. See PI for details. Serious immune-mediated adverse events can occur   with durvalumab. Please monitor your patient and refer to the linked   immune-mediated adverse reaction management materials for more information.    **Always confirm dose/schedule in your pharmacy ordering system**    Patient Characteristics:  Preoperative or Nonsurgical Candidate (Clinical Staging), Stage III -   Nonsurgical Candidate (Nonsquamous and Squamous), PS = 0, 1  Therapeutic Status: Preoperative or Nonsurgical Candidate (Clinical Staging)  AJCC T Category: cT4  AJCC N Category: cN0  AJCC M Category: cM0  AJCC 8 Stage Grouping: IIIA  ECOG Performance Status: 0  Intent of Therapy:  Non-Curative / Palliative Intent, Discussed with Patient

## 2022-04-21 NOTE — PLAN OF CARE
Problem: Adult Inpatient Plan of Care  Goal: Plan of Care Review  Outcome: Ongoing, Progressing  Flowsheets (Taken 4/21/2022 1027)  Plan of Care Reviewed With:   patient   daughter  Goal: Patient-Specific Goal (Individualized)  Outcome: Ongoing, Progressing  Flowsheets (Taken 4/21/2022 1027)  Anxieties, Fears or Concerns: None  Individualized Care Needs: Recliner, pillow, snacks  Patient-Specific Goals (Include Timeframe): Free of S/S of reaction with infusions.  Goal: Optimal Comfort and Wellbeing  Outcome: Ongoing, Progressing     Problem: Fatigue (Radiation, External Beam)  Goal: Improved Activity Tolerance  Outcome: Ongoing, Progressing  Intervention: Promote Improved Energy  Flowsheets (Taken 4/21/2022 1027)  Fatigue Management:   frequent rest breaks encouraged   paced activity encouraged  Sleep/Rest Enhancement: regular sleep/rest pattern promoted  Activity Management: Ambulated -L4    Patient to Infusion for Taxol and Carbo following her appt. With Dr. Styles. Accompanied by dtr. Pt with low BP to receive 2 liters IVF and assess for orthostatic hypotension. Patient's BP remained low post IVF, Dr. Styles notified.   Patient will not be treated today. Went to radiation, and will be discharged to home.

## 2022-04-22 DIAGNOSIS — F17.210 CIGARETTE SMOKER: ICD-10-CM

## 2022-04-22 DIAGNOSIS — C34.12 CANCER OF UPPER LOBE OF LEFT LUNG: Primary | ICD-10-CM

## 2022-04-22 DIAGNOSIS — G89.3 CANCER ASSOCIATED PAIN: ICD-10-CM

## 2022-04-22 PROCEDURE — 77386 HC IMRT, COMPLEX: CPT | Mod: PN | Performed by: STUDENT IN AN ORGANIZED HEALTH CARE EDUCATION/TRAINING PROGRAM

## 2022-04-22 PROCEDURE — 77014 PR  CT GUIDANCE PLACEMENT RAD THERAPY FIELDS: ICD-10-PCS | Mod: 26,,, | Performed by: STUDENT IN AN ORGANIZED HEALTH CARE EDUCATION/TRAINING PROGRAM

## 2022-04-22 PROCEDURE — 77014 PR  CT GUIDANCE PLACEMENT RAD THERAPY FIELDS: CPT | Mod: 26,,, | Performed by: STUDENT IN AN ORGANIZED HEALTH CARE EDUCATION/TRAINING PROGRAM

## 2022-04-22 PROCEDURE — 77014 HC CT GUIDANCE RADIATION THERAPY FLDS PLACEMENT: CPT | Mod: TC,PN | Performed by: STUDENT IN AN ORGANIZED HEALTH CARE EDUCATION/TRAINING PROGRAM

## 2022-04-25 PROCEDURE — 77386 HC IMRT, COMPLEX: CPT | Mod: PN | Performed by: STUDENT IN AN ORGANIZED HEALTH CARE EDUCATION/TRAINING PROGRAM

## 2022-04-25 PROCEDURE — 77014 PR  CT GUIDANCE PLACEMENT RAD THERAPY FIELDS: CPT | Mod: 26,,, | Performed by: STUDENT IN AN ORGANIZED HEALTH CARE EDUCATION/TRAINING PROGRAM

## 2022-04-25 PROCEDURE — 77014 HC CT GUIDANCE RADIATION THERAPY FLDS PLACEMENT: CPT | Mod: TC,PN | Performed by: STUDENT IN AN ORGANIZED HEALTH CARE EDUCATION/TRAINING PROGRAM

## 2022-04-25 PROCEDURE — 77014 PR  CT GUIDANCE PLACEMENT RAD THERAPY FIELDS: ICD-10-PCS | Mod: 26,,, | Performed by: STUDENT IN AN ORGANIZED HEALTH CARE EDUCATION/TRAINING PROGRAM

## 2022-04-26 ENCOUNTER — DOCUMENTATION ONLY (OUTPATIENT)
Dept: RADIATION ONCOLOGY | Facility: CLINIC | Age: 72
End: 2022-04-26
Payer: MEDICARE

## 2022-04-26 PROCEDURE — 77014 PR  CT GUIDANCE PLACEMENT RAD THERAPY FIELDS: CPT | Mod: 26,,, | Performed by: STUDENT IN AN ORGANIZED HEALTH CARE EDUCATION/TRAINING PROGRAM

## 2022-04-26 PROCEDURE — 77336 RADIATION PHYSICS CONSULT: CPT | Mod: PN | Performed by: STUDENT IN AN ORGANIZED HEALTH CARE EDUCATION/TRAINING PROGRAM

## 2022-04-26 PROCEDURE — 77014 HC CT GUIDANCE RADIATION THERAPY FLDS PLACEMENT: CPT | Mod: TC,PN | Performed by: STUDENT IN AN ORGANIZED HEALTH CARE EDUCATION/TRAINING PROGRAM

## 2022-04-26 PROCEDURE — 77014 PR  CT GUIDANCE PLACEMENT RAD THERAPY FIELDS: ICD-10-PCS | Mod: 26,,, | Performed by: STUDENT IN AN ORGANIZED HEALTH CARE EDUCATION/TRAINING PROGRAM

## 2022-04-26 PROCEDURE — 77386 HC IMRT, COMPLEX: CPT | Mod: PN | Performed by: STUDENT IN AN ORGANIZED HEALTH CARE EDUCATION/TRAINING PROGRAM

## 2022-04-26 NOTE — PLAN OF CARE
.Pt here for 30. Of 30 out patient radiation treatment . Pt no complaints and in stable condition.

## 2022-04-26 NOTE — PLAN OF CARE
.Pt here for  30 of 30 out patient radiation treatment . Pt no complaints and in stable condition.

## 2022-04-29 ENCOUNTER — TELEPHONE (OUTPATIENT)
Dept: INFUSION THERAPY | Facility: HOSPITAL | Age: 72
End: 2022-04-29
Payer: MEDICARE

## 2022-04-29 NOTE — TELEPHONE ENCOUNTER
[8:58 AM] Yuko Earl [id number] to start Imfinzi after she sees Stephani on 5/12. Can you add her to infusion schedule to follow Stephani appt?    [8:58 AM] Yuko Ramirez  She's already had education on it    [9:01 AM] Alycia Megjorge luis  mrn [id number] 05/12/22 @11:30

## 2022-05-02 DIAGNOSIS — M54.12 CERVICAL RADICULOPATHY: ICD-10-CM

## 2022-05-02 DIAGNOSIS — G89.3 CANCER ASSOCIATED PAIN: ICD-10-CM

## 2022-05-02 DIAGNOSIS — C34.12 CANCER OF UPPER LOBE OF LEFT LUNG: ICD-10-CM

## 2022-05-02 RX ORDER — OXYCODONE HYDROCHLORIDE 20 MG/1
20 TABLET ORAL EVERY 4 HOURS PRN
Qty: 90 TABLET | Refills: 0 | Status: SHIPPED | OUTPATIENT
Start: 2022-05-02 | End: 2022-05-13 | Stop reason: SDUPTHER

## 2022-05-03 ENCOUNTER — TELEPHONE (OUTPATIENT)
Dept: PALLIATIVE MEDICINE | Facility: CLINIC | Age: 72
End: 2022-05-03
Payer: MEDICARE

## 2022-05-03 ENCOUNTER — PATIENT MESSAGE (OUTPATIENT)
Dept: PALLIATIVE MEDICINE | Facility: CLINIC | Age: 72
End: 2022-05-03
Payer: MEDICARE

## 2022-05-03 NOTE — TELEPHONE ENCOUNTER
Called pt to check on her since Dr. Angel adjusted pain medications after recent clinic visit. Pt reports that she is currently in [facility] in [place] due to neck swelling. She appreciated call and will follow up once discharged home.

## 2022-05-10 ENCOUNTER — HOSPITAL ENCOUNTER (OUTPATIENT)
Dept: RADIOLOGY | Facility: HOSPITAL | Age: 72
Discharge: HOME | End: 2022-05-10
Attending: INTERNAL MEDICINE
Payer: MEDICARE

## 2022-05-10 DIAGNOSIS — C34.12 CANCER OF UPPER LOBE OF LEFT LUNG: ICD-10-CM

## 2022-05-10 PROCEDURE — A9698 NON-RAD CONTRAST MATERIALNOC: HCPCS | Mod: PO | Performed by: INTERNAL MEDICINE

## 2022-05-10 PROCEDURE — 71260 CT THORAX DX C+: CPT | Mod: 26,,, | Performed by: RADIOLOGY

## 2022-05-10 PROCEDURE — 71260 CT THORAX DX C+: CPT | Mod: TC,PO

## 2022-05-10 PROCEDURE — 74177 CT ABD & PELVIS W/CONTRAST: CPT | Mod: TC,PO

## 2022-05-10 PROCEDURE — 25500020 PHARM REV CODE 255: Mod: PO | Performed by: INTERNAL MEDICINE

## 2022-05-10 PROCEDURE — 74177 CT CHEST ABDOMEN PELVIS WITH CONTRAST (XPD): ICD-10-PCS | Mod: 26,,, | Performed by: RADIOLOGY

## 2022-05-10 PROCEDURE — 74177 CT ABD & PELVIS W/CONTRAST: CPT | Mod: 26,,, | Performed by: RADIOLOGY

## 2022-05-10 PROCEDURE — 71260 CT CHEST ABDOMEN PELVIS WITH CONTRAST (XPD): ICD-10-PCS | Mod: 26,,, | Performed by: RADIOLOGY

## 2022-05-10 RX ADMIN — IOHEXOL 1000 ML: 9 SOLUTION ORAL at 09:05

## 2022-05-10 RX ADMIN — IOHEXOL 75 ML: 350 INJECTION, SOLUTION INTRAVENOUS at 09:05

## 2022-05-11 ENCOUNTER — DOCUMENTATION ONLY (OUTPATIENT)
Dept: PHARMACY | Facility: AMBULARY SURGERY CENTER | Age: 72
End: 2022-05-11
Payer: MEDICARE

## 2022-05-11 NOTE — PROGRESS NOTES
Pharmacy Treatment Plan Review    Patient  Teresa Earl, 71 y.o.  1950    Indication: NSCLC     History:  stage III lung cancer  Completed chemo + XRT; now continuing with maintenance durvalumab    Labs:  CBC  Lab Results   Component Value Date    WBC 8.96 05/10/2022    HGB 11.7 (L) 05/10/2022    HCT 37.0 05/10/2022     (H) 05/10/2022     05/10/2022       BMP  Lab Results   Component Value Date     05/10/2022    K 4.7 05/10/2022     05/10/2022    CO2 26 05/10/2022    BUN 9 05/10/2022    CREATININE 1.0 05/10/2022    CALCIUM 8.7 05/10/2022    ANIONGAP 12 05/10/2022    ESTGFRAFRICA >60 05/10/2022    EGFRNONAA 57 (A) 05/10/2022       LFTs  Lab Results   Component Value Date    ALT 28 05/10/2022    AST 13 05/10/2022    ALKPHOS 98 05/10/2022    BILITOT 0.3 05/10/2022       The patient is scheduled to start the following infusion:   OP DURVALUMAB Q2W    14-day cycle for 26 cycles of:    -Durvalumab 10 mg/kg in sodium chloride 0.9% 250 mL, infusion, intravneous, on day 1    The treatment plan matches NCCN template

## 2022-05-12 ENCOUNTER — PATIENT MESSAGE (OUTPATIENT)
Dept: HEMATOLOGY/ONCOLOGY | Facility: CLINIC | Age: 72
End: 2022-05-12

## 2022-05-12 ENCOUNTER — OFFICE VISIT (OUTPATIENT)
Dept: HEMATOLOGY/ONCOLOGY | Facility: CLINIC | Age: 72
End: 2022-05-12
Payer: MEDICARE

## 2022-05-12 ENCOUNTER — DOCUMENTATION ONLY (OUTPATIENT)
Dept: INFUSION THERAPY | Facility: HOSPITAL | Age: 72
End: 2022-05-12
Payer: MEDICARE

## 2022-05-12 ENCOUNTER — INFUSION (OUTPATIENT)
Dept: INFUSION THERAPY | Facility: HOSPITAL | Age: 72
End: 2022-05-12
Attending: INTERNAL MEDICINE
Payer: MEDICARE

## 2022-05-12 VITALS
HEART RATE: 102 BPM | DIASTOLIC BLOOD PRESSURE: 76 MMHG | OXYGEN SATURATION: 97 % | TEMPERATURE: 98 F | BODY MASS INDEX: 20.15 KG/M2 | WEIGHT: 132.94 LBS | SYSTOLIC BLOOD PRESSURE: 150 MMHG | RESPIRATION RATE: 18 BRPM | HEIGHT: 68 IN

## 2022-05-12 VITALS
DIASTOLIC BLOOD PRESSURE: 89 MMHG | TEMPERATURE: 98 F | SYSTOLIC BLOOD PRESSURE: 149 MMHG | HEART RATE: 86 BPM | RESPIRATION RATE: 18 BRPM

## 2022-05-12 DIAGNOSIS — E03.9 HYPOTHYROIDISM, UNSPECIFIED TYPE: ICD-10-CM

## 2022-05-12 DIAGNOSIS — C34.12 MALIGNANT NEOPLASM OF UPPER LOBE OF LEFT LUNG: Primary | ICD-10-CM

## 2022-05-12 DIAGNOSIS — C78.1 SECONDARY MALIGNANCY OF ANTERIOR MEDIASTINUM: ICD-10-CM

## 2022-05-12 DIAGNOSIS — E11.9 TYPE 2 DIABETES MELLITUS WITHOUT COMPLICATION, WITHOUT LONG-TERM CURRENT USE OF INSULIN: ICD-10-CM

## 2022-05-12 DIAGNOSIS — C34.12 CANCER OF UPPER LOBE OF LEFT LUNG: Primary | ICD-10-CM

## 2022-05-12 PROCEDURE — 99214 OFFICE O/P EST MOD 30 MIN: CPT | Mod: PBBFAC,25,PN | Performed by: INTERNAL MEDICINE

## 2022-05-12 PROCEDURE — 99215 PR OFFICE/OUTPT VISIT, EST, LEVL V, 40-54 MIN: ICD-10-PCS | Mod: S$PBB,,, | Performed by: INTERNAL MEDICINE

## 2022-05-12 PROCEDURE — 99999 PR PBB SHADOW E&M-EST. PATIENT-LVL IV: ICD-10-PCS | Mod: PBBFAC,,, | Performed by: INTERNAL MEDICINE

## 2022-05-12 PROCEDURE — 25000003 PHARM REV CODE 250: Mod: PN | Performed by: INTERNAL MEDICINE

## 2022-05-12 PROCEDURE — 99999 PR PBB SHADOW E&M-EST. PATIENT-LVL IV: CPT | Mod: PBBFAC,,, | Performed by: INTERNAL MEDICINE

## 2022-05-12 PROCEDURE — 99215 OFFICE O/P EST HI 40 MIN: CPT | Mod: S$PBB,,, | Performed by: INTERNAL MEDICINE

## 2022-05-12 PROCEDURE — 96413 CHEMO IV INFUSION 1 HR: CPT | Mod: PN

## 2022-05-12 PROCEDURE — 63600175 PHARM REV CODE 636 W HCPCS: Mod: JG,PN | Performed by: INTERNAL MEDICINE

## 2022-05-12 RX ORDER — HEPARIN 100 UNIT/ML
500 SYRINGE INTRAVENOUS
Status: DISCONTINUED | OUTPATIENT
Start: 2022-05-12 | End: 2022-05-12 | Stop reason: HOSPADM

## 2022-05-12 RX ORDER — EPINEPHRINE 0.3 MG/.3ML
0.3 INJECTION SUBCUTANEOUS ONCE AS NEEDED
Status: DISCONTINUED | OUTPATIENT
Start: 2022-05-12 | End: 2022-05-12 | Stop reason: HOSPADM

## 2022-05-12 RX ORDER — HEPARIN 100 UNIT/ML
500 SYRINGE INTRAVENOUS
Status: CANCELLED | OUTPATIENT
Start: 2022-05-12

## 2022-05-12 RX ORDER — DIPHENHYDRAMINE HYDROCHLORIDE 50 MG/ML
50 INJECTION INTRAMUSCULAR; INTRAVENOUS ONCE AS NEEDED
Status: DISCONTINUED | OUTPATIENT
Start: 2022-05-12 | End: 2022-05-12 | Stop reason: HOSPADM

## 2022-05-12 RX ORDER — EPINEPHRINE 0.3 MG/.3ML
0.3 INJECTION SUBCUTANEOUS ONCE AS NEEDED
Status: CANCELLED | OUTPATIENT
Start: 2022-05-12

## 2022-05-12 RX ORDER — SODIUM CHLORIDE 0.9 % (FLUSH) 0.9 %
10 SYRINGE (ML) INJECTION
Status: CANCELLED | OUTPATIENT
Start: 2022-05-12

## 2022-05-12 RX ORDER — SODIUM CHLORIDE 0.9 % (FLUSH) 0.9 %
10 SYRINGE (ML) INJECTION
Status: DISCONTINUED | OUTPATIENT
Start: 2022-05-12 | End: 2022-05-12 | Stop reason: HOSPADM

## 2022-05-12 RX ORDER — DIPHENHYDRAMINE HYDROCHLORIDE 50 MG/ML
50 INJECTION INTRAMUSCULAR; INTRAVENOUS ONCE AS NEEDED
Status: CANCELLED | OUTPATIENT
Start: 2022-05-12

## 2022-05-12 RX ADMIN — Medication 500 UNITS: at 12:05

## 2022-05-12 RX ADMIN — SODIUM CHLORIDE: 0.9 INJECTION, SOLUTION INTRAVENOUS at 11:05

## 2022-05-12 RX ADMIN — DURVALUMAB 1500 MG: 500 INJECTION, SOLUTION INTRAVENOUS at 11:05

## 2022-05-12 NOTE — PROGRESS NOTES
"Subjective:       Patient ID: Teresa Earl is a 71 y.o. female.    Chief Complaint: Lung Cancer  Mrs. Earl is a 71 year old female who underwent a chest xray in September 2022 prior to a discectomy and that showed a lung mass.      She then underwent CT chest also on September 2022 which showed spiculated mass in anterior segment GRACIELA, multiple borderline/enlarged mediastinal lymph nodes.     PFT's on 11/5/21 revealed FVC 1.96 (61%), FEV1 1.47 (60%), TLC 73%, DLCO 9.18 (47%)     PET/CT on 11/11/2022 reveals  FDG avid, spiculated pleural based mass in the GRACIELA extending into the anterior mediastinum, measuring 3.5cm. SUV 8.5. no enlarged or FDG avid lymphadenopathy in chest. No distant mets but FDG avid L parotid lesion     Navigational bronch on 12/13/2021 revealed "no endobronchial disease medial GRACIELA sampled. EBUS for station 4L, 7, 11. Path: GRACIELA suspicious but not diagnostic for malignancy. 4L, 7, 11L negative for malignancy"     On 1/25/22: he met with CT surgery and is felt not to be a candidate for resection due to invasion of mediastinum.     MRI brain on 2/12/2022 revealed  no intracranial mets     CT Guided biopsy on 2/24/2022 reveals  of GRACIELA demonstrating squamous cell carcinoma. Left parotid gland demonstrating pleomorphic adenomal     Repeat CT scans on 2/28/2022 revealed "56 x 39 x 15 mm spiculated pleural-based left upper lobe mass (compatible with biopsy proven diagnosis of squamous cell carcinoma) which abuts and invades the anterior mediastinum with compression and/or invasion of the brachial cephalic vein and intimate association with the left internal mammary vessels and anterior margin of the thoracic aortic arch. Scattered solid pulmonary nodules measuring up to 3 mm in size for which short-term imaging follow-up is recommended. Centrilobular emphysematous lung architecture.  21 x 22 mm hypodense area within the medial segment of the left hepatic lobe abutting the interlobar fissure.  While it is " "possible that this represents focal fatty infiltration, an underlying solitary liver lesion or focus of metastatic disease cannot be excluded.  The patient's recent PET-CT scan showed no abnormal FDG hypermetabolism in this vicinity.  Consider additional investigation with contrast enhanced MRI of the liver. Stable 26 x 39 mm left adnexal hypodensity which show no FDG hypermetabolism at the time of the patient's recent PET-CT scan. Tiny fat containing umbilical hernia. 12 mm short axis right paratracheal lymph node which showed no FDG hypermetabolism at the time of the patient's recent PET-CT scan and which remains unchanged in size since that study"  Follow-up MRI abdomen on 3/3/2022 reveals "Hypoattenuating area on prior CT near the falciform ligament corresponds to an area of focal fatty infiltration.  No suspicious liver lesions are present"          HPI She has completed definitive chemo/RT with weekly Carboplatin and Taxo (4/14/2022) and completed Radiation on 4/26/2022  CT scans from 5/10/2022 "Interval decrease in the size of the left upper lobe tumor and precarinal mediastinal lymph node. Interval decrease in the extent of the consolidation at the base of the left lower lobe. Chronic findings of pulmonary emphysema and bilateral renal cysts"  She denies any nausea, vomiting, diarrhea, constipation, abdominal pain, weight loss or loss of appetite, chest pain, shortness of breath, leg swelling, fatigue, pain, headache, dizziness, or mood changes. Her ECOG PS is zero.         Review of Systems   Constitutional: Negative for appetite change, fatigue and unexpected weight change.   HENT: Negative for mouth sores.    Eyes: Negative for visual disturbance.   Respiratory: Positive for cough. Negative for shortness of breath.    Cardiovascular: Negative for chest pain.   Gastrointestinal: Negative for abdominal pain and diarrhea.   Genitourinary: Negative for frequency.   Musculoskeletal: Negative for back pain. "   Integumentary:  Negative for rash.   Neurological: Negative for headaches.   Hematological: Negative for adenopathy.   Psychiatric/Behavioral: The patient is not nervous/anxious.    All other systems reviewed and are negative.        Objective:      Physical Exam  Vitals reviewed.   Constitutional:       Appearance: She is well-developed.   HENT:      Mouth/Throat:      Pharynx: No oropharyngeal exudate.   Cardiovascular:      Rate and Rhythm: Normal rate.      Heart sounds: Normal heart sounds.   Pulmonary:      Effort: Pulmonary effort is normal.      Breath sounds: Normal breath sounds. No wheezing.   Abdominal:      General: Bowel sounds are normal.      Palpations: Abdomen is soft.      Tenderness: There is no abdominal tenderness.   Musculoskeletal:         General: No tenderness.   Lymphadenopathy:      Cervical: No cervical adenopathy.   Skin:     General: Skin is warm and dry.      Findings: No rash.   Neurological:      Mental Status: She is alert and oriented to person, place, and time.      Coordination: Coordination normal.   Psychiatric:         Thought Content: Thought content normal.         Judgment: Judgment normal.           LABS:  WBC   Date Value Ref Range Status   05/10/2022 8.96 3.90 - 12.70 K/uL Final     Hemoglobin   Date Value Ref Range Status   05/10/2022 11.7 (L) 12.0 - 16.0 g/dL Final     Hematocrit   Date Value Ref Range Status   05/10/2022 37.0 37.0 - 48.5 % Final     Platelets   Date Value Ref Range Status   05/10/2022 205 150 - 450 K/uL Final     Gran # (ANC)   Date Value Ref Range Status   05/10/2022 7.1 1.8 - 7.7 K/uL Final     Comment:     The ANC is based on a white cell differential from an   automated cell counter. It has not been microscopically   reviewed for the presence of abnormal cells. Clinical   correlation is required.         Chemistry        Component Value Date/Time     05/10/2022 0750    K 4.7 05/10/2022 0750     05/10/2022 0750    CO2 26 05/10/2022  0750    BUN 9 05/10/2022 0750    CREATININE 1.0 05/10/2022 0750     (H) 05/10/2022 0750        Component Value Date/Time    CALCIUM 8.7 05/10/2022 0750    ALKPHOS 98 05/10/2022 0750    AST 13 05/10/2022 0750    ALT 28 05/10/2022 0750    BILITOT 0.3 05/10/2022 0750    ESTGFRAFRICA >60 05/10/2022 0750    EGFRNONAA 57 (A) 05/10/2022 0750            Assessment:       Problem List Items Addressed This Visit     Cancer of upper lobe of left lung - Primary    Secondary malignancy of anterior mediastinum    Type 2 diabetes mellitus without complication      Other Visit Diagnoses     Hypothyroidism, unspecified type              Plan:         Route Chart for Scheduling    Med Onc Chart Routing      Follow up with physician 4 weeks. Cancel Imfinzi in 2 weeks,. then in 4 weeks scheduel CBC,CMp, TSh and free T4 and see me and for Imfinzi   Follow up with [facility]    Labs    Imaging    Pharmacy appointment    Other referrals          Treatment Plan Information   OP DURVALUMAB 1500 MG Q4W   Mree Styles MD   Upcoming Treatment Dates - OP DURVALUMAB 1500 MG Q4W    5/12/2022       Chemotherapy       durvalumab (IMFINZI) 1,500 mg in sodium chloride 0.9% 280 mL chemo infusion  6/9/2022       Chemotherapy       durvalumab (IMFINZI) 1,500 mg in sodium chloride 0.9% 280 mL chemo infusion  7/7/2022       Chemotherapy       durvalumab (IMFINZI) 1,500 mg in sodium chloride 0.9% 280 mL chemo infusion  8/4/2022       Chemotherapy       durvalumab (IMFINZI) 1,500 mg in sodium chloride 0.9% 280 mL chemo infusion    Supportive Plan Information  OP SHELL SUPPORTIVE   Mere Styles MD   Upcoming Treatment Dates - OP SHELL SUPPORTIVE    No upcoming days in selected categories.     CT scans reveal a good response. She will start Imfinzi,   Side effects were discussed and consents were signed.    Above care plan was discussed with patient  and all questions were addressed to her satisfaction

## 2022-05-12 NOTE — PLAN OF CARE
Tolerated imfinzi well.  No reactions noted.  No questions or concerns at this time.  Ambulated off unit in NAD.

## 2022-05-12 NOTE — PROGRESS NOTES
1:33 PM    This LCSW met with this pt at chairside to check in and provide support. The pt reported doing well and said she drove herself to treatment today.    This LCSW provided this pt a gas card and food from the [facility] for which she was grateful.    The pt reported no other needs at this time.

## 2022-05-12 NOTE — PROGRESS NOTES
Oncology Nutrition   Teresa Earl   1950    Therapeutic Food Pantry     Pt eligible for Therapeutic Food Pantry . Order filled out with patient at chairside. All patient questions or concerns regarding  and/or nutrition status addressed. RD to continue to monitor and provide patient food while under active treatment.     Food order filled by this RD- will provide to patient at end of infusion today.    Millie Car RDN, LDN  05/12/2022  1:31 PM

## 2022-05-13 ENCOUNTER — TELEPHONE (OUTPATIENT)
Dept: HEMATOLOGY/ONCOLOGY | Facility: CLINIC | Age: 72
End: 2022-05-13
Payer: MEDICARE

## 2022-05-13 DIAGNOSIS — G89.3 CANCER ASSOCIATED PAIN: ICD-10-CM

## 2022-05-13 DIAGNOSIS — M54.12 CERVICAL RADICULOPATHY: ICD-10-CM

## 2022-05-13 DIAGNOSIS — C34.12 CANCER OF UPPER LOBE OF LEFT LUNG: ICD-10-CM

## 2022-05-13 RX ORDER — OXYCODONE HYDROCHLORIDE 20 MG/1
20 TABLET ORAL EVERY 4 HOURS PRN
Qty: 180 TABLET | Refills: 0 | Status: SHIPPED | OUTPATIENT
Start: 2022-05-13 | End: 2022-06-09 | Stop reason: SDUPTHER

## 2022-05-13 NOTE — TELEPHONE ENCOUNTER
----- Message from Sruthi Wilson sent at 5/13/2022  8:07 AM CDT -----  Type:Needs Medical Advice    Who Called:PT  Best call back number:[phone]  Additional Info: Requesting a call back regarding#PT states she started immunotherapy yesterday and today she thinks she has a UTI.   Please Advise- Thank you

## 2022-05-13 NOTE — TELEPHONE ENCOUNTER
Spoke with patient.  She is having symptoms of UTI.  Advised her to contact her PCP for urine testing.   She thanked nurse.

## 2022-05-26 ENCOUNTER — OFFICE VISIT (OUTPATIENT)
Dept: RADIATION ONCOLOGY | Facility: CLINIC | Age: 72
End: 2022-05-26
Attending: COLON & RECTAL SURGERY
Payer: MEDICARE

## 2022-05-26 VITALS
SYSTOLIC BLOOD PRESSURE: 116 MMHG | HEART RATE: 104 BPM | OXYGEN SATURATION: 93 % | DIASTOLIC BLOOD PRESSURE: 70 MMHG | RESPIRATION RATE: 22 BRPM | HEIGHT: 68 IN | BODY MASS INDEX: 19.55 KG/M2 | WEIGHT: 129 LBS | TEMPERATURE: 99 F

## 2022-05-26 DIAGNOSIS — C34.12 CANCER OF UPPER LOBE OF LEFT LUNG: Primary | ICD-10-CM

## 2022-05-26 DIAGNOSIS — Z92.3 HISTORY OF EXTERNAL BEAM RADIATION THERAPY: ICD-10-CM

## 2022-05-26 PROCEDURE — 99214 OFFICE O/P EST MOD 30 MIN: CPT | Mod: PBBFAC,PN | Performed by: STUDENT IN AN ORGANIZED HEALTH CARE EDUCATION/TRAINING PROGRAM

## 2022-05-26 PROCEDURE — 99999 PR PBB SHADOW E&M-EST. PATIENT-LVL IV: CPT | Mod: PBBFAC,,, | Performed by: STUDENT IN AN ORGANIZED HEALTH CARE EDUCATION/TRAINING PROGRAM

## 2022-05-26 PROCEDURE — 99999 PR PBB SHADOW E&M-EST. PATIENT-LVL IV: ICD-10-PCS | Mod: PBBFAC,,, | Performed by: STUDENT IN AN ORGANIZED HEALTH CARE EDUCATION/TRAINING PROGRAM

## 2022-05-26 PROCEDURE — 99499 UNLISTED E&M SERVICE: CPT | Mod: S$PBB,,, | Performed by: STUDENT IN AN ORGANIZED HEALTH CARE EDUCATION/TRAINING PROGRAM

## 2022-05-26 PROCEDURE — 99499 NO LOS: ICD-10-PCS | Mod: S$PBB,,, | Performed by: STUDENT IN AN ORGANIZED HEALTH CARE EDUCATION/TRAINING PROGRAM

## 2022-05-26 NOTE — PROGRESS NOTES
[facility] Rad[facility]  Follow Up Visit Note    Diagnosis:  Teresa Earl is a 71 y.o. 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 She is on consolidative durva.    Oncologic History:  · This was diagnosed on workup for cervical fusion 2/2 neck pain.  · 9/20/21: CT chest showed spiculated mass in anterior segment GRACIELA, multiple borderline/enlarged mediastinal lymph nodes.  · 11/5/21:  FVC 1.96 (61%), FEV1 1.47 (60%), TLC 73%, DLCO 9.18 (47%)  · 11/11/21: PET/CT with FDG avid, spiculated pleural based mass in the GRACIELA extending into the anterior mediastinum, measuring 3.5cm. SUV 8.5. no enlarged or FDG avid lymphadenopathy in chest. No distant mets but FDG avid L parotid lesion  · 12/13/21: Navigational bronch.   ? Op note: no endobronchial disease medial GRACIELA sampled. EBUS for station 4L, 7, 11L  ? Path: GRACIELA suspicious but not diagnostic for malignancy. 4L, 7, 11L negative for malignancy.  · 1/25/22: met with CT surgery, not a candidate for resection due to invasion of mediastinum.  · 2/12/22: MRI brain with no intracranial mets  · 2/14/22: CT Guided biopsy of GRACIELA demonstrating squamous cell carcinoma. Left parotid gland demonstrating pleomorphic adenoma  · 3/10/22 - 4/26/22: definitive chemoradiation to 60 Gy in 30 fractions to GRACIELA and enlarged 4R LN.       Interval History  The patient presents today for a regularly scheduled follow up visit.  She was last seen in our clinic on 4/26/22.   At that time, she was completing her radiation.       Since that visit, she underwent CT CAP with interval decrease in the size of the treated GRACIELA and 4R lymph node.    She feels well. She denies any significant odynophagia but has has a few episodes difficulty with rice getting caught in the upper cervical esophagus but no other issues. She has lost ~5 lbs over the past month. She is eating a full diet and has an  "appetite. She has some "very little" tenderness over the treated area. Her breathing is not worse since the radiation. Denies chest pain. She is still smoking 1ppd. She has been referred to tobaco cessation but cancelled her appointment several times. No cough or hemoptysis    Review of Systems   Review of Systems   Constitutional: Positive for weight loss. Negative for malaise/fatigue.   HENT: Negative for ear pain and sore throat.    Eyes: Negative for blurred vision and double vision.   Respiratory: Negative for cough, hemoptysis, shortness of breath and wheezing.    Cardiovascular: Negative for chest pain and leg swelling.   Musculoskeletal: Negative for back pain.   Neurological: Negative for dizziness and headaches.       Social History:  Social History     Tobacco Use    Smoking status: Current Every Day Smoker     Packs/day: 0.50     Types: Cigarettes    Smokeless tobacco: Never Used    Tobacco comment: Age Started: 15   Substance Use Topics    Alcohol use: No       Family History   Problem Relation Age of Onset    Valvular heart disease Mother        Exam:  Vitals:    05/26/22 0851   BP: 116/70   BP Location: Right arm   Patient Position: Sitting   Pulse: 104   Resp: (!) 22   Temp: 98.8 °F (37.1 °C)   SpO2: (!) 93%   Weight: 58.5 kg (128 lb 15.5 oz)   Height: 5' 8" (1.727 m)     Pulse ox 96% and HR 96 on personal check during visit    Constitutional: Pleasant 71 y.o. female in no acute distress.  Well nourished. Well groomed.   HEENT: Normocephalic and atraumatic  Lungs: No audible wheezing.  Normal effort.   Musculoskeletal: No gross MSK deformities.  Skin: No rashes appreciated.   Psych: Alert and oriented with appropriate mood and affect.  Neuro: Grossly normal.    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  She has started durva   Grade 1 chest wall pain   Grade 1 " fatigue   ECOG: (0) Fully active, able to carry on all predisease performance without restriction    Plan:   RTC in 3 months or earlier PRN   We had another in depth discussion about smoking cessation, risk of recurrence, worse toxicity and or new primaries. She declined referral to smoking cessation.     Ziggy Sanders MD  Radiation Oncology

## 2022-06-08 ENCOUNTER — TELEPHONE (OUTPATIENT)
Dept: HEMATOLOGY/ONCOLOGY | Facility: CLINIC | Age: 72
End: 2022-06-08
Payer: MEDICARE

## 2022-06-08 NOTE — TELEPHONE ENCOUNTER
Spoke with dr hull. Patient was admitted to [facility] for pneumonia.  She is being DC'd today with 5 days of levaquin and now on oxygen 2 LIters nc.  She is scheduled with dr dickens tomorrow.    Nurse will speak with dr dickens to see if nurse should push her appt and imfinzi to next week, since she is on oral abx.      Message routed to dr dickens

## 2022-06-08 NOTE — TELEPHONE ENCOUNTER
----- Message from Ella Barnes, Patient Care Assistant sent at 6/8/2022  3:38 PM CDT -----  Type: Needs Medical Advice  Who Called:  jacob shepard  Best Call Back Number: [phone]  Additional Information: dr liriano  states that patient is at [facility] and they are ready to discharge ,and need a treatment plan

## 2022-06-08 NOTE — TELEPHONE ENCOUNTER
Spoke with daughter rian.  She understands dr dickens appointment and treatment tomorrow are canceled and will be rescheduled for the following week.  She thanked nurse.      Message routed to schedulers (please schedule cbc/cmp/tsh/free t4, appt with dr dickens and imfinzi next week. Cancel tomorrow.)

## 2022-06-14 ENCOUNTER — PATIENT MESSAGE (OUTPATIENT)
Dept: HEMATOLOGY/ONCOLOGY | Facility: CLINIC | Age: 72
End: 2022-06-14
Payer: MEDICARE

## 2022-06-20 ENCOUNTER — PATIENT MESSAGE (OUTPATIENT)
Dept: HEMATOLOGY/ONCOLOGY | Facility: CLINIC | Age: 72
End: 2022-06-20
Payer: MEDICARE

## 2022-06-23 ENCOUNTER — OFFICE VISIT (OUTPATIENT)
Dept: HEMATOLOGY/ONCOLOGY | Facility: CLINIC | Age: 72
End: 2022-06-23
Payer: MEDICARE

## 2022-06-23 ENCOUNTER — DOCUMENTATION ONLY (OUTPATIENT)
Dept: INFUSION THERAPY | Facility: HOSPITAL | Age: 72
End: 2022-06-23
Payer: MEDICARE

## 2022-06-23 ENCOUNTER — INFUSION (OUTPATIENT)
Dept: INFUSION THERAPY | Facility: HOSPITAL | Age: 72
End: 2022-06-23
Attending: INTERNAL MEDICINE
Payer: MEDICARE

## 2022-06-23 VITALS
DIASTOLIC BLOOD PRESSURE: 68 MMHG | HEART RATE: 79 BPM | TEMPERATURE: 98 F | WEIGHT: 128.31 LBS | SYSTOLIC BLOOD PRESSURE: 115 MMHG | BODY MASS INDEX: 19.45 KG/M2 | RESPIRATION RATE: 18 BRPM | HEIGHT: 68 IN

## 2022-06-23 VITALS
HEART RATE: 87 BPM | BODY MASS INDEX: 19.45 KG/M2 | SYSTOLIC BLOOD PRESSURE: 115 MMHG | HEIGHT: 68 IN | TEMPERATURE: 97 F | WEIGHT: 128.31 LBS | OXYGEN SATURATION: 95 % | RESPIRATION RATE: 18 BRPM | DIASTOLIC BLOOD PRESSURE: 76 MMHG

## 2022-06-23 DIAGNOSIS — C34.12 MALIGNANT NEOPLASM OF UPPER LOBE OF LEFT LUNG: Primary | ICD-10-CM

## 2022-06-23 DIAGNOSIS — E11.9 TYPE 2 DIABETES MELLITUS WITHOUT COMPLICATION, WITHOUT LONG-TERM CURRENT USE OF INSULIN: ICD-10-CM

## 2022-06-23 DIAGNOSIS — E03.9 HYPOTHYROIDISM, UNSPECIFIED TYPE: ICD-10-CM

## 2022-06-23 DIAGNOSIS — C34.12 CANCER OF UPPER LOBE OF LEFT LUNG: Primary | ICD-10-CM

## 2022-06-23 DIAGNOSIS — C78.1 SECONDARY MALIGNANCY OF ANTERIOR MEDIASTINUM: ICD-10-CM

## 2022-06-23 PROCEDURE — 99215 OFFICE O/P EST HI 40 MIN: CPT | Mod: PBBFAC,PN,25 | Performed by: INTERNAL MEDICINE

## 2022-06-23 PROCEDURE — 63600175 PHARM REV CODE 636 W HCPCS: Mod: JG,PN | Performed by: INTERNAL MEDICINE

## 2022-06-23 PROCEDURE — 99999 PR PBB SHADOW E&M-EST. PATIENT-LVL V: ICD-10-PCS | Mod: PBBFAC,,, | Performed by: INTERNAL MEDICINE

## 2022-06-23 PROCEDURE — 99215 PR OFFICE/OUTPT VISIT, EST, LEVL V, 40-54 MIN: ICD-10-PCS | Mod: S$PBB,,, | Performed by: INTERNAL MEDICINE

## 2022-06-23 PROCEDURE — 96413 CHEMO IV INFUSION 1 HR: CPT | Mod: PN

## 2022-06-23 PROCEDURE — 99999 PR PBB SHADOW E&M-EST. PATIENT-LVL V: CPT | Mod: PBBFAC,,, | Performed by: INTERNAL MEDICINE

## 2022-06-23 PROCEDURE — 25000003 PHARM REV CODE 250: Mod: PN | Performed by: INTERNAL MEDICINE

## 2022-06-23 PROCEDURE — 99215 OFFICE O/P EST HI 40 MIN: CPT | Mod: S$PBB,,, | Performed by: INTERNAL MEDICINE

## 2022-06-23 RX ORDER — HEPARIN 100 UNIT/ML
500 SYRINGE INTRAVENOUS
Status: CANCELLED | OUTPATIENT
Start: 2022-06-23

## 2022-06-23 RX ORDER — FLUTICASONE FUROATE AND VILANTEROL 100; 25 UG/1; UG/1
1 POWDER RESPIRATORY (INHALATION)
COMMUNITY
Start: 2022-06-20 | End: 2022-07-27 | Stop reason: SDUPTHER

## 2022-06-23 RX ORDER — OXYCODONE HYDROCHLORIDE 20 MG/1
20 TABLET ORAL
COMMUNITY
Start: 2022-06-10 | End: 2022-07-07 | Stop reason: SDUPTHER

## 2022-06-23 RX ORDER — HEPARIN 100 UNIT/ML
500 SYRINGE INTRAVENOUS
Status: DISCONTINUED | OUTPATIENT
Start: 2022-06-23 | End: 2022-06-23 | Stop reason: HOSPADM

## 2022-06-23 RX ORDER — GABAPENTIN 300 MG/1
300 CAPSULE ORAL EVERY MORNING
COMMUNITY
Start: 2022-04-20 | End: 2022-01-01

## 2022-06-23 RX ORDER — SULFAMETHOXAZOLE AND TRIMETHOPRIM 800; 160 MG/1; MG/1
1 TABLET ORAL 2 TIMES DAILY
COMMUNITY
Start: 2022-05-26 | End: 2023-01-01

## 2022-06-23 RX ORDER — DIPHENHYDRAMINE HYDROCHLORIDE 50 MG/ML
50 INJECTION INTRAMUSCULAR; INTRAVENOUS ONCE AS NEEDED
Status: DISCONTINUED | OUTPATIENT
Start: 2022-06-23 | End: 2022-06-23 | Stop reason: HOSPADM

## 2022-06-23 RX ORDER — EPINEPHRINE 0.3 MG/.3ML
0.3 INJECTION SUBCUTANEOUS ONCE AS NEEDED
Status: DISCONTINUED | OUTPATIENT
Start: 2022-06-23 | End: 2022-06-23 | Stop reason: HOSPADM

## 2022-06-23 RX ORDER — EPINEPHRINE 0.3 MG/.3ML
0.3 INJECTION SUBCUTANEOUS ONCE AS NEEDED
Status: CANCELLED | OUTPATIENT
Start: 2022-06-23

## 2022-06-23 RX ORDER — MUPIROCIN 20 MG/G
OINTMENT TOPICAL
COMMUNITY
Start: 2022-06-22 | End: 2023-01-01 | Stop reason: CLARIF

## 2022-06-23 RX ORDER — SODIUM CHLORIDE 0.9 % (FLUSH) 0.9 %
10 SYRINGE (ML) INJECTION
Status: DISCONTINUED | OUTPATIENT
Start: 2022-06-23 | End: 2022-06-23 | Stop reason: HOSPADM

## 2022-06-23 RX ORDER — MUPIROCIN 20 MG/G
1 OINTMENT TOPICAL 3 TIMES DAILY
COMMUNITY
Start: 2022-06-22 | End: 2022-06-29

## 2022-06-23 RX ORDER — LISINOPRIL 2.5 MG/1
2.5 TABLET ORAL DAILY
COMMUNITY
Start: 2022-06-09 | End: 2023-01-01

## 2022-06-23 RX ORDER — FLUOXETINE HYDROCHLORIDE 40 MG/1
1 CAPSULE ORAL DAILY
COMMUNITY
Start: 2022-06-20 | End: 2022-01-01 | Stop reason: SDUPTHER

## 2022-06-23 RX ORDER — FLUOXETINE HYDROCHLORIDE 40 MG/1
40 CAPSULE ORAL DAILY
COMMUNITY
Start: 2022-05-12 | End: 2022-07-27 | Stop reason: SDUPTHER

## 2022-06-23 RX ORDER — LEVOFLOXACIN 750 MG/1
750 TABLET ORAL DAILY
COMMUNITY
Start: 2022-06-08 | End: 2023-01-01

## 2022-06-23 RX ORDER — SODIUM CHLORIDE 0.9 % (FLUSH) 0.9 %
10 SYRINGE (ML) INJECTION
Status: CANCELLED | OUTPATIENT
Start: 2022-06-23

## 2022-06-23 RX ORDER — DIPHENHYDRAMINE HYDROCHLORIDE 50 MG/ML
50 INJECTION INTRAMUSCULAR; INTRAVENOUS ONCE AS NEEDED
Status: CANCELLED | OUTPATIENT
Start: 2022-06-23

## 2022-06-23 RX ADMIN — SODIUM CHLORIDE: 9 INJECTION, SOLUTION INTRAVENOUS at 01:06

## 2022-06-23 RX ADMIN — DURVALUMAB 1500 MG: 500 INJECTION, SOLUTION INTRAVENOUS at 02:06

## 2022-06-23 RX ADMIN — Medication 500 UNITS: at 03:06

## 2022-06-23 NOTE — PROGRESS NOTES
"Subjective:       Patient ID: Teresa Earl is a 71 y.o. female.    Chief Complaint: Cancer of upper lobe left lung  Mrs. Earl is a 71 year old female who underwent a chest xray in September 2022 prior to a discectomy and that showed a lung mass.      She then underwent CT chest also on September 2022 which showed spiculated mass in anterior segment GRACIELA, multiple borderline/enlarged mediastinal lymph nodes.     PFT's on 11/5/21 revealed FVC 1.96 (61%), FEV1 1.47 (60%), TLC 73%, DLCO 9.18 (47%)     PET/CT on 11/11/2022 reveals  FDG avid, spiculated pleural based mass in the GRACIELA extending into the anterior mediastinum, measuring 3.5cm. SUV 8.5. no enlarged or FDG avid lymphadenopathy in chest. No distant mets but FDG avid L parotid lesion     Navigational bronch on 12/13/2021 revealed "no endobronchial disease medial GRACIELA sampled. EBUS for station 4L, 7, 11. Path: GRACIELA suspicious but not diagnostic for malignancy. 4L, 7, 11L negative for malignancy"     On 1/25/22: he met with CT surgery and is felt not to be a candidate for resection due to invasion of mediastinum.     MRI brain on 2/12/2022 revealed  no intracranial mets     CT Guided biopsy on 2/24/2022 reveals  of GRACIELA demonstrating squamous cell carcinoma. Left parotid gland demonstrating pleomorphic adenomal     Repeat CT scans on 2/28/2022 revealed "56 x 39 x 15 mm spiculated pleural-based left upper lobe mass (compatible with biopsy proven diagnosis of squamous cell carcinoma) which abuts and invades the anterior mediastinum with compression and/or invasion of the brachial cephalic vein and intimate association with the left internal mammary vessels and anterior margin of the thoracic aortic arch. Scattered solid pulmonary nodules measuring up to 3 mm in size for which short-term imaging follow-up is recommended. Centrilobular emphysematous lung architecture.  21 x 22 mm hypodense area within the medial segment of the left hepatic lobe abutting the interlobar " "fissure.  While it is possible that this represents focal fatty infiltration, an underlying solitary liver lesion or focus of metastatic disease cannot be excluded.  The patient's recent PET-CT scan showed no abnormal FDG hypermetabolism in this vicinity.  Consider additional investigation with contrast enhanced MRI of the liver. Stable 26 x 39 mm left adnexal hypodensity which show no FDG hypermetabolism at the time of the patient's recent PET-CT scan. Tiny fat containing umbilical hernia. 12 mm short axis right paratracheal lymph node which showed no FDG hypermetabolism at the time of the patient's recent PET-CT scan and which remains unchanged in size since that study"  Follow-up MRI abdomen on 3/3/2022 reveals "Hypoattenuating area on prior CT near the falciform ligament corresponds to an area of focal fatty infiltration.  No suspicious liver lesions are present"          She completed definitive chemo/RT with weekly Carboplatin and Taxo (4/14/2022) and completed Radiation on 4/26/2022  CT scans from 5/10/2022 "Interval decrease in the size of the left upper lobe tumor and precarinal mediastinal lymph node. Interval decrease in the extent of the consolidation at the base of the left lower lobe. Chronic findings of pulmonary emphysema and bilateral renal cysts"  She started consolidation Imfinzi on 5/12/2022    [facility]  [facility]zi  She was hospitalized with COPD exacerbation and suspected osteoymyelitis. MRI could not be interpreted due to motion artifact. She was discharged on oral levaquin and home oxygen. Not needing home oxygen currently  She denies any nausea, vomiting, diarrhea, constipation, abdominal pain, weight loss or loss of appetite, chest pain, shortness of breath, leg swelling, fatigue, pain, headache, dizziness, or mood changes. Her ECOG PS is zero.           Review of Systems   Constitutional: Negative for appetite change, fatigue and unexpected weight change.   HENT: Negative " for mouth sores.    Eyes: Negative for visual disturbance.   Respiratory: Negative for cough and shortness of breath.    Cardiovascular: Negative for chest pain.   Gastrointestinal: Negative for abdominal pain and diarrhea.   Genitourinary: Negative for frequency.   Musculoskeletal: Negative for back pain.   Integumentary:  Negative for rash.   Neurological: Negative for headaches.   Hematological: Negative for adenopathy.   Psychiatric/Behavioral: The patient is not nervous/anxious.    All other systems reviewed and are negative.        Objective:      Physical Exam  Vitals reviewed.   Constitutional:       Appearance: She is well-developed.   HENT:      Mouth/Throat:      Pharynx: No oropharyngeal exudate.   Cardiovascular:      Rate and Rhythm: Normal rate.      Heart sounds: Normal heart sounds.   Pulmonary:      Effort: Pulmonary effort is normal.      Breath sounds: Normal breath sounds. No wheezing.   Abdominal:      General: Bowel sounds are normal.      Palpations: Abdomen is soft.      Tenderness: There is no abdominal tenderness.   Musculoskeletal:         General: No tenderness.   Lymphadenopathy:      Cervical: No cervical adenopathy.   Skin:     General: Skin is warm and dry.      Findings: No rash.   Neurological:      Mental Status: She is alert and oriented to person, place, and time.      Coordination: Coordination normal.   Psychiatric:         Thought Content: Thought content normal.         Judgment: Judgment normal.           LABS:  WBC   Date Value Ref Range Status   06/23/2022 12.50 3.90 - 12.70 K/uL Final     Hemoglobin   Date Value Ref Range Status   06/23/2022 12.0 12.0 - 16.0 g/dL Final     Hematocrit   Date Value Ref Range Status   06/23/2022 40.1 37.0 - 48.5 % Final     Platelets   Date Value Ref Range Status   06/23/2022 263 150 - 450 K/uL Final     Gran # (ANC)   Date Value Ref Range Status   06/23/2022 9.5 (H) 1.8 - 7.7 K/uL Final     Gran %   Date Value Ref Range Status   06/23/2022  76.3 (H) 38.0 - 73.0 % Final       Chemistry        Component Value Date/Time     06/23/2022 1155    K 4.3 06/23/2022 1155     06/23/2022 1155    CO2 25 06/23/2022 1155    BUN 9 06/23/2022 1155    CREATININE 0.8 06/23/2022 1155    GLU 95 06/23/2022 1155        Component Value Date/Time    CALCIUM 8.8 06/23/2022 1155    ALKPHOS 80 06/23/2022 1155    AST 11 06/23/2022 1155    ALT 7 (L) 06/23/2022 1155    BILITOT 0.2 06/23/2022 1155    ESTGFRAFRICA >60 06/23/2022 1155    EGFRNONAA >60 06/23/2022 1155        TSH   Date Value Ref Range Status   06/23/2022 0.860 0.400 - 4.000 uIU/mL Final     Free T4   Date Value Ref Range Status   06/23/2022 0.86 0.71 - 1.51 ng/dL Final         Assessment:       Problem List Items Addressed This Visit     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 In 8 weeks schedule CBC,CMP, TSh, free T4, CT chest, abdomen/pelvis and MRI brain and see me and for Imfinzi   Follow up with CLARISSE 4 weeks. Schedule CBC, CMP and see CLARISSE and for Imfinzi    Infusion scheduling note    Injection scheduling note    Labs    Imaging    Pharmacy appointment    Other referrals          Treatment Plan Information   OP DURVALUMAB 1500 MG Q4W   Mere Styles MD   Upcoming Treatment Dates - OP DURVALUMAB 1500 MG Q4W    6/23/2022       Chemotherapy       durvalumab (IMFINZI) 1,500 mg in sodium chloride 0.9% 280 mL chemo infusion  7/21/2022       Chemotherapy       durvalumab (IMFINZI) 1,500 mg in sodium chloride 0.9% 280 mL chemo infusion  8/18/2022       Chemotherapy       durvalumab (IMFINZI) 1,500 mg in sodium chloride 0.9% 280 mL chemo infusion  9/15/2022       Chemotherapy       durvalumab (IMFINZI) 1,500 mg in sodium chloride 0.9% 280 mL chemo infusion    Supportive Plan Information  OP SHELL SUPPORTIVE    Mere Styles MD   Upcoming Treatment Dates - OP SHELL SUPPORTIVE    No upcoming days in selected categories.     She will proceed with Imfinzi, will return in 4 weeks to see CLARISSE for Imfinzi,. I will see her in 8 weeks with MRI brain and CT chest, abdomen/pelvis with contrast for Imfinzi. If contrast shortage persists will obtain PET scan    Above care plan was discussed with patient and accompanying daughter and all questions were addressed to their satisfaction

## 2022-06-23 NOTE — PROGRESS NOTES
Oncology Nutrition   Chemotherapy Infusion Visit    Nutrition Follow Up   RD met with patient at chairside during infusion treatment. Pt completed chemotherapy and is now on immunotherapy. Pt reports doing much better nutritionally. Pt did report she was hospitalized recently for COPD exacerbation which did cause her to lose weight but feeling much better.     Pt eligible for TFP order- provided at the end of infusion tx today by this RD.    Wt Readings from Last 10 Encounters:   06/23/22 58.2 kg (128 lb 4.9 oz)   06/23/22 58.2 kg (128 lb 4.9 oz)   05/26/22 58.5 kg (128 lb 15.5 oz)   05/12/22 60.3 kg (132 lb 15 oz)   04/29/22 60.8 kg (134 lb)   04/21/22 61 kg (134 lb 7.7 oz)   04/21/22 61 kg (134 lb 7.7 oz)   04/14/22 61.9 kg (136 lb 7.4 oz)   04/14/22 61.9 kg (136 lb 7.4 oz)   04/07/22 61.4 kg (135 lb 5.8 oz)       All other nutrition questions/concerns addressed as appropriate. Will continue to follow and monitor throughout treatment PRN.     Jessica Gentile, MS, RD, LDN  06/23/2022  3:20 PM

## 2022-06-23 NOTE — PROGRESS NOTES
2:48 PM    This LCSW met with this pt at chairside to check in and provide support and encouragement.  The pt reported recently being in the hospital and said she is so glad she is out bc she is not a good patient when in the hospital bc she does not like it at all       The pt was accompanied by her daughter at chairside who drove her to treatment today.    The pt was provided a gas card and reported no other needs at this time.

## 2022-06-24 ENCOUNTER — PATIENT MESSAGE (OUTPATIENT)
Dept: HEMATOLOGY/ONCOLOGY | Facility: CLINIC | Age: 72
End: 2022-06-24
Payer: MEDICARE

## 2022-07-11 ENCOUNTER — TELEPHONE (OUTPATIENT)
Dept: HEMATOLOGY/ONCOLOGY | Facility: CLINIC | Age: 72
End: 2022-07-11
Payer: MEDICARE

## 2022-07-11 NOTE — TELEPHONE ENCOUNTER
Spoke with pt to get appts changed and informed her tomorrow appts were scheduled in error pt verbalized acceptance for 7/21 date

## 2022-07-11 NOTE — TELEPHONE ENCOUNTER
----- Message from Sri Garrett sent at 7/11/2022  1:33 PM CDT -----  Regarding: SRINATH Ramirez  Type: Needs Medical Advice  Who Called:  Patient   Symptoms (please be specific):    How long has patient had these symptoms:    Pharmacy name and phone #:    Best Call Back Number: [phone]  Additional Information: Patient is requesting a call back regarding her appts.

## 2022-07-20 ENCOUNTER — TELEPHONE (OUTPATIENT)
Dept: HEMATOLOGY/ONCOLOGY | Facility: CLINIC | Age: 72
End: 2022-07-20
Payer: MEDICARE

## 2022-07-22 ENCOUNTER — PATIENT MESSAGE (OUTPATIENT)
Dept: HEMATOLOGY/ONCOLOGY | Facility: CLINIC | Age: 72
End: 2022-07-22
Payer: MEDICARE

## 2022-07-22 ENCOUNTER — TELEPHONE (OUTPATIENT)
Dept: HEMATOLOGY/ONCOLOGY | Facility: CLINIC | Age: 72
End: 2022-07-22
Payer: MEDICARE

## 2022-07-22 NOTE — TELEPHONE ENCOUNTER
Spoke with the pt and got the pt reschedule for labs, md, and infusion on 07/27. Pt verbalized and understanding.  ----- Message from Sruthi Wilson sent at 7/22/2022  2:31 PM CDT -----  Regarding: Dr Styles  Type:Needs Medical Advice    Who Called:PT  Best call back number:[phone]  Additional Info: Requesting a call back regarding#PT needs to reschedule appts missed from 7/21. Please call to reschedule..  Please Advise- Thank you

## 2022-07-27 ENCOUNTER — DOCUMENTATION ONLY (OUTPATIENT)
Dept: INFUSION THERAPY | Facility: HOSPITAL | Age: 72
End: 2022-07-27
Payer: MEDICARE

## 2022-07-27 ENCOUNTER — INFUSION (OUTPATIENT)
Dept: INFUSION THERAPY | Facility: HOSPITAL | Age: 72
End: 2022-07-27
Attending: INTERNAL MEDICINE
Payer: MEDICARE

## 2022-07-27 ENCOUNTER — OFFICE VISIT (OUTPATIENT)
Dept: HEMATOLOGY/ONCOLOGY | Facility: CLINIC | Age: 72
End: 2022-07-27
Payer: MEDICARE

## 2022-07-27 VITALS
WEIGHT: 125.88 LBS | RESPIRATION RATE: 16 BRPM | HEART RATE: 92 BPM | TEMPERATURE: 97 F | SYSTOLIC BLOOD PRESSURE: 133 MMHG | BODY MASS INDEX: 19.08 KG/M2 | DIASTOLIC BLOOD PRESSURE: 82 MMHG | HEIGHT: 68 IN | OXYGEN SATURATION: 94 %

## 2022-07-27 VITALS
BODY MASS INDEX: 19.08 KG/M2 | SYSTOLIC BLOOD PRESSURE: 170 MMHG | RESPIRATION RATE: 18 BRPM | TEMPERATURE: 98 F | DIASTOLIC BLOOD PRESSURE: 78 MMHG | WEIGHT: 125.88 LBS | HEIGHT: 68 IN | HEART RATE: 77 BPM

## 2022-07-27 DIAGNOSIS — G89.3 CANCER ASSOCIATED PAIN: ICD-10-CM

## 2022-07-27 DIAGNOSIS — C34.12 MALIGNANT NEOPLASM OF UPPER LOBE OF LEFT LUNG: Primary | ICD-10-CM

## 2022-07-27 DIAGNOSIS — C78.1 SECONDARY MALIGNANCY OF ANTERIOR MEDIASTINUM: ICD-10-CM

## 2022-07-27 DIAGNOSIS — C34.12 CANCER OF UPPER LOBE OF LEFT LUNG: Primary | ICD-10-CM

## 2022-07-27 PROCEDURE — 99999 PR PBB SHADOW E&M-EST. PATIENT-LVL V: CPT | Mod: PBBFAC,,, | Performed by: NURSE PRACTITIONER

## 2022-07-27 PROCEDURE — 99214 PR OFFICE/OUTPT VISIT, EST, LEVL IV, 30-39 MIN: ICD-10-PCS | Mod: S$PBB,,, | Performed by: NURSE PRACTITIONER

## 2022-07-27 PROCEDURE — 63600175 PHARM REV CODE 636 W HCPCS: Mod: PN | Performed by: NURSE PRACTITIONER

## 2022-07-27 PROCEDURE — 25000003 PHARM REV CODE 250: Mod: PN | Performed by: NURSE PRACTITIONER

## 2022-07-27 PROCEDURE — 99999 PR PBB SHADOW E&M-EST. PATIENT-LVL V: ICD-10-PCS | Mod: PBBFAC,,, | Performed by: NURSE PRACTITIONER

## 2022-07-27 PROCEDURE — 99215 OFFICE O/P EST HI 40 MIN: CPT | Mod: PBBFAC,PN,25 | Performed by: NURSE PRACTITIONER

## 2022-07-27 PROCEDURE — 96413 CHEMO IV INFUSION 1 HR: CPT | Mod: PN

## 2022-07-27 PROCEDURE — 99214 OFFICE O/P EST MOD 30 MIN: CPT | Mod: S$PBB,,, | Performed by: NURSE PRACTITIONER

## 2022-07-27 RX ORDER — SODIUM CHLORIDE 0.9 % (FLUSH) 0.9 %
10 SYRINGE (ML) INJECTION
Status: CANCELLED | OUTPATIENT
Start: 2022-07-27

## 2022-07-27 RX ORDER — HEPARIN 100 UNIT/ML
500 SYRINGE INTRAVENOUS
Status: CANCELLED | OUTPATIENT
Start: 2022-07-27

## 2022-07-27 RX ORDER — DIPHENHYDRAMINE HYDROCHLORIDE 50 MG/ML
50 INJECTION INTRAMUSCULAR; INTRAVENOUS ONCE AS NEEDED
Status: DISCONTINUED | OUTPATIENT
Start: 2022-07-27 | End: 2022-07-27 | Stop reason: HOSPADM

## 2022-07-27 RX ORDER — EPINEPHRINE 0.3 MG/.3ML
0.3 INJECTION SUBCUTANEOUS ONCE AS NEEDED
Status: CANCELLED | OUTPATIENT
Start: 2022-07-27

## 2022-07-27 RX ORDER — DIPHENHYDRAMINE HYDROCHLORIDE 50 MG/ML
50 INJECTION INTRAMUSCULAR; INTRAVENOUS ONCE AS NEEDED
Status: CANCELLED | OUTPATIENT
Start: 2022-07-27

## 2022-07-27 RX ORDER — HEPARIN 100 UNIT/ML
500 SYRINGE INTRAVENOUS
Status: DISCONTINUED | OUTPATIENT
Start: 2022-07-27 | End: 2022-07-27 | Stop reason: HOSPADM

## 2022-07-27 RX ORDER — SODIUM CHLORIDE 0.9 % (FLUSH) 0.9 %
10 SYRINGE (ML) INJECTION
Status: DISCONTINUED | OUTPATIENT
Start: 2022-07-27 | End: 2022-07-27 | Stop reason: HOSPADM

## 2022-07-27 RX ORDER — EPINEPHRINE 0.3 MG/.3ML
0.3 INJECTION SUBCUTANEOUS ONCE AS NEEDED
Status: DISCONTINUED | OUTPATIENT
Start: 2022-07-27 | End: 2022-07-27 | Stop reason: HOSPADM

## 2022-07-27 RX ADMIN — DURVALUMAB 1500 MG: 500 INJECTION, SOLUTION INTRAVENOUS at 11:07

## 2022-07-27 RX ADMIN — SODIUM CHLORIDE: 0.9 INJECTION, SOLUTION INTRAVENOUS at 11:07

## 2022-07-27 RX ADMIN — Medication 500 UNITS: at 02:07

## 2022-07-27 NOTE — PROGRESS NOTES
"PATIENT: Teresa Earl  MRN: [id number]  DATE: 7/27/2022      Diagnosis:   1. Cancer of upper lobe of left lung    2. Secondary malignancy of anterior mediastinum        Chief Complaint: Clearance for C3 of Imfinzi    Subjective:   HPI: Ms. Earl is a 71 y.o. female with Diabetes mellitus type 2, HTN, cervical radiculopathy who is known to Dr. Styles with diagnosis of squamous cell carcinoma of the lung.   Chemo/XRT completed 04/26/22.  Imfinzi started:  05/12/22  She is here for consideration of C3 Imfinzi today.    TODAY:  07/27/22  Pain is well managed with Palliative care; recent increase of meds is allowing her to function well throughout the day without somnolence.  Appetite is good despite weight loss.  Will add additional protein to daily regime.   Endorses some fatigue, but functions well. Peripheral neuropathy has improved.  Denies HA, CP, N/V, abd pain, diarrhea, constipation, dysuria, changes in urinary frequency, swelling, rashes. Has had no fevers, chills, no new lumps or bumps.       Prior History:  Underwent a chest xray in September 2022 prior to a discectomy and that showed a lung mass.      She then underwent CT chest also on September 2022 which showed spiculated mass in anterior segment GRACIELA, multiple borderline/enlarged mediastinal lymph nodes.     PFT's on 11/5/21 revealed FVC 1.96 (61%), FEV1 1.47 (60%), TLC 73%, DLCO 9.18 (47%)     PET/CT on 11/11/2022 reveals  FDG avid, spiculated pleural based mass in the GRACIELA extending into the anterior mediastinum, measuring 3.5cm. SUV 8.5. no enlarged or FDG avid lymphadenopathy in chest. No distant mets but FDG avid L parotid lesion     Navigational bronch on 12/13/2021 revealed "no endobronchial disease medial GRACIELA sampled. EBUS for station 4L, 7, 11. Path: GRACIELA suspicious but not diagnostic for malignancy. 4L, 7, 11L negative for malignancy"     On 1/25/22: he met with CT surgery and is felt not to be a candidate for resection due to invasion of " "mediastinum.     MRI brain on 2/12/2022 revealed  no intracranial mets     CT Guided biopsy on 2/24/2022 reveals  of GRACIELA demonstrating squamous cell carcinoma. Left parotid gland demonstrating pleomorphic adenoma    2/28/2022 repeat CT scans reveal "56 x 39 x 15 mm spiculated pleural-based left upper lobe mass (compatible with biopsy proven diagnosis of squamous cell carcinoma) which abuts and invades the anterior mediastinum with compression and/or invasion of the brachial cephalic vein and intimate association with the left internal mammary vessels and anterior margin of the thoracic aortic arch. Scattered solid pulmonary nodules measuring up to 3 mm in size for which short-term imaging follow-up is recommended. Centrilobular emphysematous lung architecture.  21 x 22 mm hypodense area within the medial segment of the left hepatic lobe abutting the interlobar fissure. While it is possible that this represents focal fatty infiltration, an underlying solitary liver lesion or focus of metastatic disease cannot be excluded.  The patient's recent PET-CT scan showed no abnormal FDG hypermetabolism in this vicinity.  Consider additional investigation with contrast enhanced MRI of the liver. Stable 26 x 39 mm left adnexal hypodensity which show no FDG hypermetabolism at the time of the patient's recent PET-CT scan. Tiny fat containing umbilical hernia. 12 mm short axis right paratracheal lymph node which showed no FDG hypermetabolism at the time of the patient's recent PET-CT scan and which remains unchanged in size since that study"    3/2/2022: follow up MRI abdomen reveals hypo attenuating area on prior CT near the falciform ligament corresponds to an area of focal fatty infiltration. NO suspicious liver lesions are present.    Past Medical History:   Past Medical History:   Diagnosis Date    Acute kidney injury     Cervical radiculopathy     Cigarette smoker 1/26/2022    Diabetes mellitus     Hypertension     " Mass of upper lobe of left lung 1/26/2022    Neuropathy     Rhabdomyolysis        Past Surgical HIstory:   Past Surgical History:   Procedure Laterality Date    BILATERAL TUBAL LIGATION      ENDOBRONCHIAL ULTRASOUND N/A 12/13/2021    Procedure: ENDOBRONCHIAL ULTRASOUN;  Surgeon: William Padgett Jr., DO;  Location: [facility];  Service: Pulmonary;  Laterality: N/A;    HYSTERECTOMY      INSERTION OF TUNNELED CENTRAL VENOUS CATHETER (CVC) WITH SUBCUTANEOUS PORT N/A 3/9/2022    Procedure: [id number];  Surgeon: Jos Solitario MD;  Location: [facility] OR;  Service: General;  Laterality: N/A;    NAVIGATIONAL BRONCHOSCOPY Left 12/13/2021    Procedure: BRONCHOSCOPY, NAVIGATIONAL;  Surgeon: William Padgett Jr., DO;  Location: [facility];  Service: Pulmonary;  Laterality: Left;       Family History:   Family History   Problem Relation Age of Onset    Valvular heart disease Mother        Social History:  reports that she has been smoking cigarettes. She has been smoking about 0.50 packs per day. She has never used smokeless tobacco. She reports that she does not drink alcohol.    Allergies:  Review of patient's allergies indicates:   Allergen Reactions    Codeine Hives and Other (See Comments)     Other reaction(s): Unknown  Other reaction(s): Unknown      Ibuprofen Swelling    Morphine Diarrhea and Other (See Comments)     Other reaction(s): Renal Dysfunctions  Other reaction(s): Renal Dysfunctions         Medications:  Current Outpatient Medications   Medication Sig Dispense Refill    albuterol (PROVENTIL/VENTOLIN HFA) 90 mcg/actuation inhaler Inhale 2 puffs into the lungs every 6 (six) hours as needed for Wheezing or Shortness of Breath. Rescue 1 g 5    BREO ELLIPTA 100-25 mcg/dose diskus inhaler Inhale 2 puffs into the lungs once daily.      empagliflozin (JARDIANCE) 25 mg tablet Take 1 tablet by mouth once daily.      FLUoxetine 20 MG capsule TAKE TWO CAPSULES BY MOUTH EVERY MORNING 30 capsule 2     FLUoxetine 40 MG capsule Take 1 capsule by mouth once daily.      fluticasone furoate-vilanteroL (BREO ELLIPTA) 100-25 mcg/dose diskus inhaler Inhale 1 puff into the lungs.      gabapentin (NEURONTIN) 300 MG capsule Take 300 mg by mouth every morning.      gabapentin (NEURONTIN) 800 MG tablet Take 800 mg by mouth 3 (three) times daily.      LIDOcaine-prilocaine (EMLA) cream Apply topically as needed (Prior to port access). 30 g 0    lovastatin (MEVACOR) 10 MG tablet Take 10 mg by mouth nightly.      metformin (GLUCOPHAGE) 1000 MG tablet Take 1,000 mg by mouth 2 (two) times daily with meals.      methadone (DOLOPHINE) 5 MG tablet Take 1 tablet (5 mg total) by mouth 3 (three) times daily. 90 tablet 0    mupirocin (BACTROBAN) 2 % ointment       ondansetron (ZOFRAN) 8 MG tablet Take 1 tablet (8 mg total) by mouth daily as needed for Nausea. 40 tablet 1    oxyCODONE (ROXICODONE) 20 mg Tab immediate release tablet Take 1 tablet (20 mg total) by mouth every 4 (four) hours as needed for Pain. 180 tablet 0    prochlorperazine (COMPAZINE) 5 MG tablet Take 1 tablet (5 mg total) by mouth every 6 (six) hours as needed for Nausea. 60 tablet 2    promethazine (PHENERGAN) 12.5 MG Tab Take 1 tablet (12.5 mg total) by mouth every 6 (six) hours as needed (N/V). 30 tablet 0    diclofenac (VOLTAREN) 75 MG EC tablet Take 75 mg by mouth 2 (two) times daily.      duke's soln (benadryl 30 mL, mylanta 30 mL, LIDOcaine 30 mL, nystatin 30 mL) 120mL Take 10 mLs by mouth 4 (four) times daily. (Patient not taking: Reported on 7/27/2022) 120 mL 1    FLUoxetine 40 MG capsule Take 40 mg by mouth once daily.      fluticasone furoate-vilanteroL (BREO) 100-25 mcg/dose diskus inhaler Inhale 1 puff into the lungs.      JARDIANCE 25 mg tablet Take 1 Tablet (25 mg) by mouth daily in the morning.      levoFLOXacin (LEVAQUIN) 750 MG tablet Take 750 mg by mouth once daily.      LIDOcaine (LIDODERM) 5 % apply ONE PATCH ONCE DAILY FOR 12  "hours THEN REMOVE      lisinopriL (PRINIVIL,ZESTRIL) 2.5 MG tablet Take 2.5 mg by mouth once daily.      naloxone (NARCAN) 4 mg/actuation Spry 4mg by nasal route as needed for opioid overdose; may repeat every 2-3 minutes in alternating nostrils until medical help arrives. Call 911 (Patient not taking: Reported on 7/27/2022) 1 each 11    predniSONE (DELTASONE) 5 MG tablet Take 1 tablet (5 mg total) by mouth once daily. (Patient not taking: No sig reported) 30 tablet 0    promethazine (PHENERGAN) 25 MG tablet Take 25 mg by mouth every 6 (six) hours as needed.      sulfamethoxazole-trimethoprim 800-160mg (BACTRIM DS) 800-160 mg Tab Take 1 tablet by mouth 2 (two) times daily.       No current facility-administered medications for this visit.       Review of Systems   Constitutional: Positive for fatigue. Negative for activity change, chills and fever.   HENT: Negative for mouth sores, sore throat and trouble swallowing.    Eyes: Negative for visual disturbance.   Respiratory: Negative for cough and shortness of breath.    Cardiovascular: Negative for chest pain, palpitations and leg swelling.   Gastrointestinal: Negative for abdominal pain, constipation, diarrhea, nausea and vomiting.   Genitourinary: Negative for difficulty urinating, frequency and hematuria.   Musculoskeletal: Negative for arthralgias and joint swelling.   Skin: Negative for rash.   Neurological: Negative for light-headedness and headaches.   Hematological: Negative for adenopathy. Does not bruise/bleed easily.       ECOG Performance Status:   ECOG SCORE           Objective:      Vitals:   Weight:  Loss of 2 1/2 pounds in 4 weeks  Vitals:    07/27/22 0939   BP: 133/82   BP Location: Left arm   Patient Position: Sitting   BP Method: Medium (Automatic)   Pulse: 92   Resp: 16   Temp: 96.6 °F (35.9 °C)   TempSrc: Temporal   SpO2: (!) 94%   Weight: 57.1 kg (125 lb 14.1 oz)   Height: 5' 8" (1.727 m)     BMI: Body mass index is 19.14 kg/m².    Physical " Exam  Vitals reviewed.   Constitutional:       General: She is not in acute distress.     Appearance: She is not diaphoretic.   HENT:      Head: Normocephalic and atraumatic.      Mouth/Throat:      Mouth: Mucous membranes are moist.      Pharynx: Oropharynx is clear.   Eyes:      General: No scleral icterus.     Conjunctiva/sclera: Conjunctivae normal.   Cardiovascular:      Rate and Rhythm: Normal rate and regular rhythm.      Heart sounds: Normal heart sounds. No murmur heard.  Pulmonary:      Effort: Pulmonary effort is normal. No respiratory distress.      Breath sounds: Examination of the left-upper field reveals decreased breath sounds. Decreased breath sounds present. No wheezing or rhonchi.   Chest:       Abdominal:      General: Bowel sounds are normal. There is no distension.      Palpations: Abdomen is soft. There is no mass.      Tenderness: There is no abdominal tenderness. There is no guarding.   Musculoskeletal:         General: No swelling. Normal range of motion.      Cervical back: Neck supple. No tenderness.      Right lower leg: No edema.      Left lower leg: No edema.   Lymphadenopathy:      Cervical: No cervical adenopathy.   Skin:     General: Skin is warm.      Capillary Refill: Capillary refill takes less than 2 seconds.      Findings: No rash.      Comments: Radiation changes to chest wall    Neurological:      Mental Status: She is alert and oriented to person, place, and time.      Gait: Gait normal.   Psychiatric:         Mood and Affect: Mood normal.         Behavior: Behavior normal.         Thought Content: Thought content normal.         Laboratory Data:  Lab Results   Component Value Date    WBC 11.17 07/27/2022    HGB 12.9 07/27/2022    HCT 42.5 07/27/2022    MCV 97 07/27/2022     07/27/2022      ANC = 8.9    CMP  Sodium   Date Value Ref Range Status   07/27/2022 138 136 - 145 mmol/L Final     Potassium   Date Value Ref Range Status   07/27/2022 4.7 3.5 - 5.1 mmol/L Final      Chloride   Date Value Ref Range Status   07/27/2022 101 95 - 110 mmol/L Final     CO2   Date Value Ref Range Status   07/27/2022 27 23 - 29 mmol/L Final     Glucose   Date Value Ref Range Status   07/27/2022 143 (H) 70 - 110 mg/dL Final     BUN   Date Value Ref Range Status   07/27/2022 12 8 - 23 mg/dL Final     Creatinine   Date Value Ref Range Status   07/27/2022 0.9 0.5 - 1.4 mg/dL Final     Calcium   Date Value Ref Range Status   07/27/2022 9.9 8.7 - 10.5 mg/dL Final     Total Protein   Date Value Ref Range Status   07/27/2022 7.6 6.0 - 8.4 g/dL Final     Albumin   Date Value Ref Range Status   07/27/2022 3.4 (L) 3.5 - 5.2 g/dL Final     Total Bilirubin   Date Value Ref Range Status   07/27/2022 1.1 (H) 0.1 - 1.0 mg/dL Final     Comment:     For infants and newborns, interpretation of results should be based  on gestational age, weight and in agreement with clinical  observations.    Premature Infant recommended reference ranges:  Up to 24 hours.............<8.0 mg/dL  Up to 48 hours............<12.0 mg/dL  3-5 days..................<15.0 mg/dL  6-29 days.................<15.0 mg/dL       Alkaline Phosphatase   Date Value Ref Range Status   07/27/2022 79 55 - 135 U/L Final     AST   Date Value Ref Range Status   07/27/2022 13 10 - 40 U/L Final     ALT   Date Value Ref Range Status   07/27/2022 10 10 - 44 U/L Final     Anion Gap   Date Value Ref Range Status   07/27/2022 10 8 - 16 mmol/L Final     eGFR if    Date Value Ref Range Status   07/27/2022 >60 >60 mL/min/1.73 m^2 Final     eGFR if non    Date Value Ref Range Status   07/27/2022 >60 >60 mL/min/1.73 m^2 Final     Comment:     Calculation used to obtain the estimated glomerular filtration  rate (eGFR) is the CKD-EPI equation.          Assessment:       1. Cancer of upper lobe of left lung    2. Secondary malignancy of anterior mediastinum         Plan:   Cancer off the upper lobe of the lung  -Squamous cell carcinoma of  the left upper lobe of the lung, not a candidate for resection due to mediastinal invasion  -Weekly Carbo/Taxol concurrent with XRT:  Completed 04/14/22; Radiation completed 04/26/22  -Presently on Imfinzi, started 05/12/22; Proceed with Cycle 3 Imfinzi today  -Follow up in 4 weeks with Dr. Styles, labs/imaging    Immunodeficency due to chemotherapy  -Currently no s/s infection  -Continue to monitor    Diabetes Mellitus Type 2  -Takes Jardiance, Metformin  Lab Results   Component Value Date    HGBA1C 7.9 (H) 06/20/2022   -Blood sugars are still elevated but improved from last 3 weeks  -Follows with PCP  -Will monitor    Cancer pain  -managed by Palliative care:  On Oxycodone 20 mg q 4 hours prn & methadone 5 mg tid  Gabapentin in am & pm  -functioning without pain or somnolence    Patient queried and all questions were answered.  Route Chart for Scheduling    Med Onc Chart Routing      Follow up with physician . 08/23/22 with Dr. Styles as scheduled with labs/imaging prior   Follow up with [facility]    Infusion scheduling note    Injection scheduling note    Labs    Lab interval:  Dr. Styles placed orders for labs on 06/23:  CBC, CMP, TSH, free T4 - needs lab appt prior to office visit on 08/23   Imaging    Pharmacy appointment No pharmacy appointment needed      Other referrals No additional referrals needed         Treatment Plan Information   OP DURVALUMAB 1500 MG Q4W   Mere Styles MD   Upcoming Treatment Dates - OP DURVALUMAB 1500 MG Q4W    7/27/2022       Chemotherapy       durvalumab (IMFINZI) 1,500 mg in sodium chloride 0.9% 280 mL chemo infusion  8/24/2022       Chemotherapy       durvalumab (IMFINZI) 1,500 mg in sodium chloride 0.9% 280 mL chemo infusion  9/21/2022       Chemotherapy       durvalumab (IMFINZI) 1,500 mg in sodium chloride 0.9% 280 mL chemo infusion  10/19/2022       Chemotherapy       durvalumab (IMFINZI) 1,500 mg in sodium chloride 0.9% 280 mL chemo infusion    Supportive Plan Information  OP  SHELL SUPPORTIVE   Mere Styles MD   Upcoming Treatment Dates - OP SHELL SUPPORTIVE    No upcoming days in selected categories.

## 2022-07-27 NOTE — PROGRESS NOTES
11:43 AM    This LCSW met with this pt to check in and provide a gas card. The pt reported meeting with the dietitian earlier and thanked me for coming to check on her.  She reported her daughter, a teacher at [employer] brought her to treatment today and will pick her up later, as they live together.  She said when her daughter returns to teaching in August her niece will drop her off and her daughter will pick her up after school.    The pt reported no needs at this time.

## 2022-07-27 NOTE — PROGRESS NOTES
Oncology Nutrition   Chemotherapy Infusion Visit    Nutrition Follow Up   RD met with patient at chairside during infusion treatment. Pt states her appetite is good and denies nutrition related side effects, despite weight loss. Patient with 2.3% weight loss in last two months which is not considered significant.     Wt Readings from Last 10 Encounters:   07/27/22 57.1 kg (125 lb 14.1 oz)   07/27/22 57.1 kg (125 lb 14.1 oz)   06/23/22 58.2 kg (128 lb 4.9 oz)   06/23/22 58.2 kg (128 lb 4.9 oz)   05/26/22 58.5 kg (128 lb 15.5 oz)   05/12/22 60.3 kg (132 lb 15 oz)   04/29/22 60.8 kg (134 lb)   04/21/22 61 kg (134 lb 7.7 oz)   04/21/22 61 kg (134 lb 7.7 oz)   04/14/22 61.9 kg (136 lb 7.4 oz)       All other nutrition questions/concerns addressed as appropriate. Will continue to follow and monitor throughout treatment PRN.     Jessica Gentile, MS, RD, LDN  07/27/2022  11:58 AM

## 2022-08-09 ENCOUNTER — PATIENT MESSAGE (OUTPATIENT)
Dept: HEMATOLOGY/ONCOLOGY | Facility: CLINIC | Age: 72
End: 2022-08-09
Payer: MEDICARE

## 2022-08-12 DIAGNOSIS — C34.12 CANCER OF UPPER LOBE OF LEFT LUNG: Primary | ICD-10-CM

## 2022-08-15 ENCOUNTER — HOSPITAL ENCOUNTER (OUTPATIENT)
Dept: RADIOLOGY | Facility: HOSPITAL | Age: 72
Discharge: HOME | End: 2022-08-15
Attending: INTERNAL MEDICINE
Payer: MEDICARE

## 2022-08-15 DIAGNOSIS — C34.12 CANCER OF UPPER LOBE OF LEFT LUNG: ICD-10-CM

## 2022-08-15 PROCEDURE — 70553 MRI BRAIN STEM W/O & W/DYE: CPT | Mod: 26,,, | Performed by: RADIOLOGY

## 2022-08-15 PROCEDURE — 71260 CT THORAX DX C+: CPT | Mod: TC,PO

## 2022-08-15 PROCEDURE — 25500020 PHARM REV CODE 255: Mod: PO | Performed by: INTERNAL MEDICINE

## 2022-08-15 PROCEDURE — 74177 CT CHEST ABDOMEN PELVIS WITH CONTRAST (XPD): ICD-10-PCS | Mod: 26,,, | Performed by: RADIOLOGY

## 2022-08-15 PROCEDURE — 70553 MRI BRAIN W WO CONTRAST: ICD-10-PCS | Mod: 26,,, | Performed by: RADIOLOGY

## 2022-08-15 PROCEDURE — 74177 CT ABD & PELVIS W/CONTRAST: CPT | Mod: TC,PO

## 2022-08-15 PROCEDURE — A9698 NON-RAD CONTRAST MATERIALNOC: HCPCS | Mod: PO | Performed by: INTERNAL MEDICINE

## 2022-08-15 PROCEDURE — 70553 MRI BRAIN STEM W/O & W/DYE: CPT | Mod: TC,PO

## 2022-08-15 PROCEDURE — 71260 CT THORAX DX C+: CPT | Mod: 26,,, | Performed by: RADIOLOGY

## 2022-08-15 PROCEDURE — 71260 CT CHEST ABDOMEN PELVIS WITH CONTRAST (XPD): ICD-10-PCS | Mod: 26,,, | Performed by: RADIOLOGY

## 2022-08-15 PROCEDURE — 74177 CT ABD & PELVIS W/CONTRAST: CPT | Mod: 26,,, | Performed by: RADIOLOGY

## 2022-08-15 PROCEDURE — A9585 GADOBUTROL INJECTION: HCPCS | Mod: PO | Performed by: INTERNAL MEDICINE

## 2022-08-15 RX ADMIN — GADOBUTROL 5 ML: 604.72 INJECTION INTRAVENOUS at 09:08

## 2022-08-15 RX ADMIN — IOHEXOL 1000 ML: 9 SOLUTION ORAL at 09:08

## 2022-08-15 RX ADMIN — IOHEXOL 75 ML: 350 INJECTION, SOLUTION INTRAVENOUS at 09:08

## 2022-08-23 ENCOUNTER — OFFICE VISIT (OUTPATIENT)
Dept: HEMATOLOGY/ONCOLOGY | Facility: CLINIC | Age: 72
End: 2022-08-23
Payer: MEDICARE

## 2022-08-23 ENCOUNTER — DOCUMENTATION ONLY (OUTPATIENT)
Dept: INFUSION THERAPY | Facility: HOSPITAL | Age: 72
End: 2022-08-23
Payer: MEDICARE

## 2022-08-23 ENCOUNTER — INFUSION (OUTPATIENT)
Dept: INFUSION THERAPY | Facility: HOSPITAL | Age: 72
End: 2022-08-23
Attending: NURSE PRACTITIONER
Payer: MEDICARE

## 2022-08-23 ENCOUNTER — DOCUMENTATION ONLY (OUTPATIENT)
Dept: INFUSION THERAPY | Facility: HOSPITAL | Age: 72
End: 2022-08-23

## 2022-08-23 VITALS
DIASTOLIC BLOOD PRESSURE: 83 MMHG | OXYGEN SATURATION: 97 % | RESPIRATION RATE: 18 BRPM | HEIGHT: 68 IN | BODY MASS INDEX: 18.84 KG/M2 | SYSTOLIC BLOOD PRESSURE: 164 MMHG | TEMPERATURE: 97 F | HEART RATE: 82 BPM | WEIGHT: 124.31 LBS

## 2022-08-23 VITALS
RESPIRATION RATE: 18 BRPM | OXYGEN SATURATION: 97 % | DIASTOLIC BLOOD PRESSURE: 73 MMHG | HEIGHT: 68 IN | HEART RATE: 83 BPM | WEIGHT: 124.31 LBS | TEMPERATURE: 98 F | BODY MASS INDEX: 18.84 KG/M2 | SYSTOLIC BLOOD PRESSURE: 116 MMHG

## 2022-08-23 DIAGNOSIS — D84.821 IMMUNODEFICIENCY DUE TO DRUGS: ICD-10-CM

## 2022-08-23 DIAGNOSIS — C78.1 SECONDARY MALIGNANCY OF ANTERIOR MEDIASTINUM: ICD-10-CM

## 2022-08-23 DIAGNOSIS — C34.12 MALIGNANT NEOPLASM OF UPPER LOBE OF LEFT LUNG: Primary | ICD-10-CM

## 2022-08-23 DIAGNOSIS — C34.12 CANCER OF UPPER LOBE OF LEFT LUNG: Primary | ICD-10-CM

## 2022-08-23 DIAGNOSIS — E11.9 TYPE 2 DIABETES MELLITUS WITHOUT COMPLICATION, WITHOUT LONG-TERM CURRENT USE OF INSULIN: ICD-10-CM

## 2022-08-23 DIAGNOSIS — Z79.899 IMMUNODEFICIENCY DUE TO DRUGS: ICD-10-CM

## 2022-08-23 PROCEDURE — 99214 OFFICE O/P EST MOD 30 MIN: CPT | Mod: PBBFAC,PN | Performed by: INTERNAL MEDICINE

## 2022-08-23 PROCEDURE — 99215 OFFICE O/P EST HI 40 MIN: CPT | Mod: S$PBB,,, | Performed by: INTERNAL MEDICINE

## 2022-08-23 PROCEDURE — 99999 PR PBB SHADOW E&M-EST. PATIENT-LVL IV: CPT | Mod: PBBFAC,,, | Performed by: INTERNAL MEDICINE

## 2022-08-23 PROCEDURE — 99215 PR OFFICE/OUTPT VISIT, EST, LEVL V, 40-54 MIN: ICD-10-PCS | Mod: S$PBB,,, | Performed by: INTERNAL MEDICINE

## 2022-08-23 PROCEDURE — 99999 PR PBB SHADOW E&M-EST. PATIENT-LVL IV: ICD-10-PCS | Mod: PBBFAC,,, | Performed by: INTERNAL MEDICINE

## 2022-08-23 PROCEDURE — 63600175 PHARM REV CODE 636 W HCPCS: Mod: JG,PN | Performed by: INTERNAL MEDICINE

## 2022-08-23 PROCEDURE — 96413 CHEMO IV INFUSION 1 HR: CPT | Mod: PN

## 2022-08-23 PROCEDURE — A4216 STERILE WATER/SALINE, 10 ML: HCPCS | Mod: PN | Performed by: INTERNAL MEDICINE

## 2022-08-23 PROCEDURE — 25000003 PHARM REV CODE 250: Mod: PN | Performed by: INTERNAL MEDICINE

## 2022-08-23 RX ORDER — HEPARIN 100 UNIT/ML
500 SYRINGE INTRAVENOUS
Status: DISCONTINUED | OUTPATIENT
Start: 2022-08-23 | End: 2022-08-23 | Stop reason: HOSPADM

## 2022-08-23 RX ORDER — HEPARIN 100 UNIT/ML
500 SYRINGE INTRAVENOUS
Status: CANCELLED | OUTPATIENT
Start: 2022-08-23

## 2022-08-23 RX ORDER — SODIUM CHLORIDE 0.9 % (FLUSH) 0.9 %
10 SYRINGE (ML) INJECTION
Status: CANCELLED | OUTPATIENT
Start: 2022-08-23

## 2022-08-23 RX ORDER — EPINEPHRINE 0.3 MG/.3ML
0.3 INJECTION SUBCUTANEOUS ONCE AS NEEDED
Status: DISCONTINUED | OUTPATIENT
Start: 2022-08-23 | End: 2022-08-23 | Stop reason: HOSPADM

## 2022-08-23 RX ORDER — SODIUM CHLORIDE 0.9 % (FLUSH) 0.9 %
10 SYRINGE (ML) INJECTION
Status: DISCONTINUED | OUTPATIENT
Start: 2022-08-23 | End: 2022-08-23 | Stop reason: HOSPADM

## 2022-08-23 RX ORDER — EPINEPHRINE 0.3 MG/.3ML
0.3 INJECTION SUBCUTANEOUS ONCE AS NEEDED
Status: CANCELLED | OUTPATIENT
Start: 2022-08-23

## 2022-08-23 RX ORDER — DIPHENHYDRAMINE HYDROCHLORIDE 50 MG/ML
50 INJECTION INTRAMUSCULAR; INTRAVENOUS ONCE AS NEEDED
Status: DISCONTINUED | OUTPATIENT
Start: 2022-08-23 | End: 2022-08-23 | Stop reason: HOSPADM

## 2022-08-23 RX ORDER — DIPHENHYDRAMINE HYDROCHLORIDE 50 MG/ML
50 INJECTION INTRAMUSCULAR; INTRAVENOUS ONCE AS NEEDED
Status: CANCELLED | OUTPATIENT
Start: 2022-08-23

## 2022-08-23 RX ADMIN — SODIUM CHLORIDE: 0.9 INJECTION, SOLUTION INTRAVENOUS at 10:08

## 2022-08-23 RX ADMIN — Medication 500 UNITS: at 11:08

## 2022-08-23 RX ADMIN — DURVALUMAB 1500 MG: 500 INJECTION, SOLUTION INTRAVENOUS at 10:08

## 2022-08-23 RX ADMIN — Medication 10 ML: at 11:08

## 2022-08-23 NOTE — PROGRESS NOTES
11:16 AM    This LCSW met with this pt to check in and provide support.  This LCSW informed the pt that we are out of gas cards, but will have some next time she comes for treatment.  The pt expressed understanding.

## 2022-08-23 NOTE — PLAN OF CARE
Pt arrived to clinic today for Imfinzi infusion and tolerated well with no changes throughout therapy. Pt aware of side effects and number to call for any needs and discharged to home in NAD. Pt aware of f/u appts.

## 2022-08-23 NOTE — PROGRESS NOTES
"Subjective:       Patient ID: Teresa Earl is a 71 y.o. female.    Chief Complaint: Cancer of upper lobe of left lung  Mrs. Earl is a 71 year old female who underwent a chest xray in September 2022 prior to a discectomy and that showed a lung mass.      She then underwent CT chest also on September 2022 which showed spiculated mass in anterior segment GRACIELA, multiple borderline/enlarged mediastinal lymph nodes.     PFT's on 11/5/21 revealed FVC 1.96 (61%), FEV1 1.47 (60%), TLC 73%, DLCO 9.18 (47%)     PET/CT on 11/11/2022 reveals  FDG avid, spiculated pleural based mass in the GRACIELA extending into the anterior mediastinum, measuring 3.5cm. SUV 8.5. no enlarged or FDG avid lymphadenopathy in chest. No distant mets but FDG avid L parotid lesion     Navigational bronch on 12/13/2021 revealed "no endobronchial disease medial GRACIELA sampled. EBUS for station 4L, 7, 11. Path: GRACIELA suspicious but not diagnostic for malignancy. 4L, 7, 11L negative for malignancy"     On 1/25/22: he met with CT surgery and is felt not to be a candidate for resection due to invasion of mediastinum.     MRI brain on 2/12/2022 revealed  no intracranial mets     CT Guided biopsy on 2/24/2022 reveals  of GRACIELA demonstrating squamous cell carcinoma. Left parotid gland demonstrating pleomorphic adenomal     Repeat CT scans on 2/28/2022 revealed "56 x 39 x 15 mm spiculated pleural-based left upper lobe mass (compatible with biopsy proven diagnosis of squamous cell carcinoma) which abuts and invades the anterior mediastinum with compression and/or invasion of the brachial cephalic vein and intimate association with the left internal mammary vessels and anterior margin of the thoracic aortic arch. Scattered solid pulmonary nodules measuring up to 3 mm in size for which short-term imaging follow-up is recommended. Centrilobular emphysematous lung architecture.  21 x 22 mm hypodense area within the medial segment of the left hepatic lobe abutting the interlobar " "fissure.  While it is possible that this represents focal fatty infiltration, an underlying solitary liver lesion or focus of metastatic disease cannot be excluded.  The patient's recent PET-CT scan showed no abnormal FDG hypermetabolism in this vicinity.  Consider additional investigation with contrast enhanced MRI of the liver. Stable 26 x 39 mm left adnexal hypodensity which show no FDG hypermetabolism at the time of the patient's recent PET-CT scan. Tiny fat containing umbilical hernia. 12 mm short axis right paratracheal lymph node which showed no FDG hypermetabolism at the time of the patient's recent PET-CT scan and which remains unchanged in size since that study"  Follow-up MRI abdomen on 3/3/2022 reveals "Hypoattenuating area on prior CT near the falciform ligament corresponds to an area of focal fatty infiltration.  No suspicious liver lesions are present"          She completed definitive chemo/RT with weekly Carboplatin and Taxo (4/14/2022) and completed Radiation on 4/26/2022  CT scans from 5/10/2022 "Interval decrease in the size of the left upper lobe tumor and precarinal mediastinal lymph node. Interval decrease in the extent of the consolidation at the base of the left lower lobe. Chronic findings of pulmonary emphysema and bilateral renal cysts"  She started consolidation Imfinzi on 5/12/2022     HPI She comes in for cycle 3 of  Imfinzi.  Ct scans from 8/15/2022 reveals "Left upper lobe mass previously seen encasing the left brachiocephalic vein measured at 4.7 x 2.7 cm today versus 4.2 x 2.6 cm previously but when directly compared is not thought significantly changed.  Left brachiocephalic vein not opacified today with injection into the right arm.  Precarinal node not significantly changed, 7 x 17 mm today, 9 x 17 mm previously.  5 mm nodular density right lateral apex more prominent today. Within the abdomen and pelvis nodular thickening of the left adrenal gland is not significantly changed " "compared to the prior exams.  Area of decreased density liver suggesting focal fatty change near the falciform corresponds as seen on prior exam and MRI of 03/03/2022 with no hepatic masses suspicious for metastatic disease"  MRI brain 8/15/2022 "There is no acute abnormality.  There is no hemorrhage, mass/mass effect, acute infarction.  There is no pathologic enhancement.  There is no evidence of intracranial metastatic disease in this patient with a provided history of lung cancer. There is a known heterogeneously enhancing mass in the posterior aspect of the left parotid gland"       She denies any nausea, vomiting, diarrhea, constipation, abdominal pain, weight loss or loss of appetite, chest pain, shortness of breath, leg swelling, fatigue, pain, headache, dizziness, or mood changes. Her ECOG PS is zero. She is accompanied by her daughter        Review of Systems   Constitutional: Negative for appetite change, fatigue and unexpected weight change.   HENT: Negative for mouth sores.    Eyes: Negative for visual disturbance.   Respiratory: Negative for cough and shortness of breath.    Cardiovascular: Negative for chest pain.   Gastrointestinal: Negative for abdominal pain and diarrhea.   Genitourinary: Negative for frequency.   Musculoskeletal: Negative for back pain.   Integumentary:  Negative for rash.   Neurological: Negative for headaches.   Hematological: Negative for adenopathy.   Psychiatric/Behavioral: The patient is not nervous/anxious.    All other systems reviewed and are negative.        Objective:      Physical Exam      LABS:  WBC   Date Value Ref Range Status   08/23/2022 11.49 3.90 - 12.70 K/uL Final     Hemoglobin   Date Value Ref Range Status   08/23/2022 12.8 12.0 - 16.0 g/dL Final     Hematocrit   Date Value Ref Range Status   08/23/2022 42.1 37.0 - 48.5 % Final     Platelets   Date Value Ref Range Status   08/23/2022 293 150 - 450 K/uL Final     Gran # (ANC)   Date Value Ref Range Status "   08/23/2022 9.0 (H) 1.8 - 7.7 K/uL Final     Gran %   Date Value Ref Range Status   08/23/2022 78.0 (H) 38.0 - 73.0 % Final       Chemistry        Component Value Date/Time     08/23/2022 0819    K 4.2 08/23/2022 0819     08/23/2022 0819    CO2 25 08/23/2022 0819    BUN 11 08/23/2022 0819    CREATININE 1.0 08/23/2022 0819     (H) 08/23/2022 0819        Component Value Date/Time    CALCIUM 9.5 08/23/2022 0819    ALKPHOS 81 08/23/2022 0819    AST 10 08/23/2022 0819    ALT 6 (L) 08/23/2022 0819    BILITOT 0.3 08/23/2022 0819    ESTGFRAFRICA >60 07/27/2022 0858    EGFRNONAA >60 07/27/2022 0858          Assessment:       Problem List Items Addressed This Visit     Cancer of upper lobe of left lung - Primary    Immunodeficiency due to drugs    Secondary malignancy of anterior mediastinum    Type 2 diabetes mellitus without complication          Plan:          Route Chart for Scheduling    Med Onc Chart Routing      Follow up with physician 4 weeks. Schedule CBC,CMP and see me and for Imfinzi   Follow up with CLARISSE    Infusion scheduling note    Injection scheduling note    Labs    Imaging    Pharmacy appointment    Other referrals          Treatment Plan Information   OP DURVALUMAB 1500 MG Q4W   Mere Styles MD   Upcoming Treatment Dates - OP DURVALUMAB 1500 MG Q4W    8/23/2022       Chemotherapy       durvalumab (IMFINZI) 1,500 mg in sodium chloride 0.9% 280 mL chemo infusion  9/20/2022       Chemotherapy       durvalumab (IMFINZI) 1,500 mg in sodium chloride 0.9% 280 mL chemo infusion  10/18/2022       Chemotherapy       durvalumab (IMFINZI) 1,500 mg in sodium chloride 0.9% 280 mL chemo infusion  11/15/2022       Chemotherapy       durvalumab (IMFINZI) 1,500 mg in sodium chloride 0.9% 280 mL chemo infusion    Supportive Plan Information  OP SHELL SUPPORTIVE   Mere Styles MD   Upcoming Treatment Dates - OP SHELL SUPPORTIVE    No upcoming days in selected categories.    She is doing well clinically  and will proceed with Imfinzi and will return in 4 weeks for next cycle.  CT scans and MRI brain with no recurrence and stable findings. Will repeat scans in mid November 2022    Above care plan was discussed with patient and accompanying daughter and all questions were addressed to their satisfaction

## 2022-08-23 NOTE — PLAN OF CARE
Problem: Adult Inpatient Plan of Care  Goal: Plan of Care Review  8/23/2022 1348 by Kayce Ravi RN  Outcome: Ongoing, Progressing  Flowsheets (Taken 8/23/2022 1141)  Plan of Care Reviewed With: patient  8/23/2022 1036 by Kayce Ravi RN  Outcome: Ongoing, Progressing  Goal: Patient-Specific Goal (Individualized)  8/23/2022 1348 by Kayce Ravi RN  Outcome: Ongoing, Progressing  Flowsheets (Taken 8/23/2022 1141)  Anxieties, Fears or Concerns: none voiced  Individualized Care Needs: recliner, pillow, snacks, blanket, dim lights, tv, education, conversation  Patient-Specific Goals (Include Timeframe): no signs of reaction during treatmnet  8/23/2022 1036 by Kayce Ravi RN  Outcome: Ongoing, Progressing     Problem: Fatigue  Goal: Improved Activity Tolerance  8/23/2022 1348 by Kayce Ravi RN  Outcome: Ongoing, Progressing  8/23/2022 1036 by Kayce Ravi RN  Outcome: Ongoing, Progressing  Intervention: Promote Improved Energy  Flowsheets (Taken 8/23/2022 1141)  Fatigue Management:   paced activity encouraged   fatigue-related activity identified   frequent rest breaks encouraged   activity assistance provided  Sleep/Rest Enhancement:   relaxation techniques promoted   therapeutic touch utilized   natural light exposure provided   consistent schedule promoted   noise level reduced   room darkened   family presence promoted  Activity Management:   Ambulated -L4   Ambulated in [place] - L4

## 2022-08-30 NOTE — PROGRESS NOTES
[facility] of Radiation Oncology  Follow Up Visit Note    Diagnosis:  Teresa Earl is a 71 y.o. 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 She is on consolidative durva.    Oncologic History:  This was diagnosed on workup for cervical fusion 2/2 neck pain.  9/20/21: CT chest showed spiculated mass in anterior segment GRACIELA, multiple borderline/enlarged mediastinal lymph nodes.  11/5/21:  FVC 1.96 (61%), FEV1 1.47 (60%), TLC 73%, DLCO 9.18 (47%)  11/11/21: PET/CT with FDG avid, spiculated pleural based mass in the GRACIELA extending into the anterior mediastinum, measuring 3.5cm. SUV 8.5. no enlarged or FDG avid lymphadenopathy in chest. No distant mets but FDG avid L parotid lesion  12/13/21: Navigational bronch.   Op note: no endobronchial disease medial GRACIELA sampled. EBUS for station 4L, 7, 11L  Path: GRACIELA suspicious but not diagnostic for malignancy. 4L, 7, 11L negative for malignancy.  1/25/22: met with CT surgery, not a candidate for resection due to invasion of mediastinum.  2/12/22: MRI brain with no intracranial mets  2/14/22: CT Guided biopsy of GRACIELA demonstrating squamous cell carcinoma. Left parotid gland demonstrating pleomorphic adenoma  3/10/22 - 4/26/22: definitive chemoradiation to 60 Gy in 30 fractions to GRACIELA and enlarged 4R LN.  5/10/22: CT CAP with decrease in the size of the left upper lobe tumor and precarinal mediastinal lymph node       Interval History  The patient presents today for a regularly scheduled follow up visit.      Since last visit she underwent MRI brain and CT CAP, demonstrating no brain metastases and stable GRACIELA mass and LN compared to imaging in May.    She feels great. No change in her breathing. She has some tenderness in her anterior chest but this has improved. No worsening cough, no hemoptysis. No weight loss. No headaches, focal weakness or numbness. She is still smoking  "1/2 pack per day.     Review of Systems   As above    Social History:  Social History     Tobacco Use    Smoking status: Every Day     Packs/day: 0.50     Types: Cigarettes    Smokeless tobacco: Never    Tobacco comments:     Age Started: 15   Substance Use Topics    Alcohol use: No       Family History   Problem Relation Age of Onset    Valvular heart disease Mother        Exam:  Vitals:    08/31/22 0955   BP: (!) 143/70   BP Location: Right arm   Patient Position: Sitting   Pulse: 102   Resp: 20   Temp: 98.1 °F (36.7 °C)   SpO2: 95%   Weight: 56.9 kg (125 lb 7.1 oz)   Height: 5' 8" (1.727 m)       Constitutional: Pleasant 71 y.o. female in no acute distress.  Well nourished. Well groomed.   HEENT: Normocephalic and atraumatic  Lungs: No audible wheezing.  Normal effort.   Musculoskeletal: No gross MSK deformities.  Skin: No rashes appreciated.   Psych: Alert and oriented with appropriate mood and affect.  Neuro: Grossly normal.    Data Review:  CT CAP from 8/15/22 was personally reviewed with Ms. Earl    Assessment:  stage IIIA, T4N0M0 squamous cell carcinoma of the GRACIELA s/p definitive chemoradiation to 60 Gy in 30 fractions, completed 4/26/22.  She is on durva. She is doing well with improving grade 1 chest wall pain and no other subacute toxicities from her RT  MRI brain without brain mets   CT CAP with stable changes in GRACIELA. No new disease  ECOG: (0) Fully active, able to carry on all predisease performance without restriction    Plan:  RTC in 3 months or earlier PRN  She will continue durva and is to see Dr. Styles back 9/20/22  We had another in depth discussion about smoking cessation, risk of recurrence, worse toxicity and or new primaries. She declined referral to smoking cessation.     Ziggy Sanders MD  Radiation Oncology          "

## 2022-08-31 ENCOUNTER — OFFICE VISIT (OUTPATIENT)
Dept: RADIATION ONCOLOGY | Facility: CLINIC | Age: 72
End: 2022-08-31
Payer: MEDICARE

## 2022-08-31 VITALS
RESPIRATION RATE: 20 BRPM | OXYGEN SATURATION: 95 % | HEART RATE: 102 BPM | DIASTOLIC BLOOD PRESSURE: 70 MMHG | BODY MASS INDEX: 19.01 KG/M2 | HEIGHT: 68 IN | TEMPERATURE: 98 F | WEIGHT: 125.44 LBS | SYSTOLIC BLOOD PRESSURE: 143 MMHG

## 2022-08-31 DIAGNOSIS — C34.90 MALIGNANT NEOPLASM OF LUNG, UNSPECIFIED LATERALITY, UNSPECIFIED PART OF LUNG: ICD-10-CM

## 2022-08-31 DIAGNOSIS — Z92.3 HISTORY OF EXTERNAL BEAM RADIATION THERAPY: ICD-10-CM

## 2022-08-31 DIAGNOSIS — C34.12 MALIGNANT NEOPLASM OF UPPER LOBE OF LEFT LUNG: Primary | ICD-10-CM

## 2022-08-31 PROCEDURE — 99213 OFFICE O/P EST LOW 20 MIN: CPT | Mod: S$PBB,,, | Performed by: STUDENT IN AN ORGANIZED HEALTH CARE EDUCATION/TRAINING PROGRAM

## 2022-08-31 PROCEDURE — 99999 PR PBB SHADOW E&M-EST. PATIENT-LVL IV: CPT | Mod: PBBFAC,,, | Performed by: STUDENT IN AN ORGANIZED HEALTH CARE EDUCATION/TRAINING PROGRAM

## 2022-08-31 PROCEDURE — 99214 OFFICE O/P EST MOD 30 MIN: CPT | Mod: PBBFAC,PN | Performed by: STUDENT IN AN ORGANIZED HEALTH CARE EDUCATION/TRAINING PROGRAM

## 2022-08-31 PROCEDURE — 99213 PR OFFICE/OUTPT VISIT, EST, LEVL III, 20-29 MIN: ICD-10-PCS | Mod: S$PBB,,, | Performed by: STUDENT IN AN ORGANIZED HEALTH CARE EDUCATION/TRAINING PROGRAM

## 2022-08-31 PROCEDURE — 99999 PR PBB SHADOW E&M-EST. PATIENT-LVL IV: ICD-10-PCS | Mod: PBBFAC,,, | Performed by: STUDENT IN AN ORGANIZED HEALTH CARE EDUCATION/TRAINING PROGRAM

## 2022-09-20 NOTE — PROGRESS NOTES
1:27 PM  This LCSW met with this pt briefly and provided this pt with a gas card. The pt was in good spirits and reported no other needs at this time.  JANIS Pinto filled her TFP order.

## 2022-09-20 NOTE — PLAN OF CARE
Problem: Adult Inpatient Plan of Care  Goal: Plan of Care Review  Outcome: Ongoing, Progressing  Flowsheets (Taken 9/20/2022 1147)  Plan of Care Reviewed With:   patient   daughter  Goal: Patient-Specific Goal (Individualized)  Outcome: Ongoing, Progressing  Flowsheets (Taken 9/20/2022 1147)  Anxieties, Fears or Concerns: none voiced  Individualized Care Needs: recliner, pillow, snacks, blanket, dim lights, tv, education, conversation, /dietician visits  Patient-Specific Goals (Include Timeframe): infection prevention maintained during treatment     Problem: Fatigue  Goal: Improved Activity Tolerance  Outcome: Ongoing, Progressing  Intervention: Promote Improved Energy  Flowsheets (Taken 9/20/2022 1147)  Fatigue Management:   frequent rest breaks encouraged   paced activity encouraged   fatigue-related activity identified   activity assistance provided  Sleep/Rest Enhancement:   awakenings minimized   relaxation techniques promoted   natural light exposure provided   consistent schedule promoted   room darkened   family presence promoted   noise level reduced   therapeutic touch utilized  Activity Management:   Ambulated -L4   Ambulated in [place] - L4   Ambulated to bathroom - L4

## 2022-09-20 NOTE — PROGRESS NOTES
"Subjective:       Patient ID: Teresa Earl is a 72 y.o. female.    Chief Complaint: Cancer of upper lobe of left lung  Mrs. Earl is a 71 year old female who underwent a chest xray in September 2022 prior to a discectomy and that showed a lung mass.      She then underwent CT chest also on September 2022 which showed spiculated mass in anterior segment GRACIELA, multiple borderline/enlarged mediastinal lymph nodes.     PFT's on 11/5/21 revealed FVC 1.96 (61%), FEV1 1.47 (60%), TLC 73%, DLCO 9.18 (47%)     PET/CT on 11/11/2022 reveals  FDG avid, spiculated pleural based mass in the GRACIELA extending into the anterior mediastinum, measuring 3.5cm. SUV 8.5. no enlarged or FDG avid lymphadenopathy in chest. No distant mets but FDG avid L parotid lesion     Navigational bronch on 12/13/2021 revealed "no endobronchial disease medial GRACIELA sampled. EBUS for station 4L, 7, 11. Path: GRACIELA suspicious but not diagnostic for malignancy. 4L, 7, 11L negative for malignancy"     On 1/25/22: he met with CT surgery and is felt not to be a candidate for resection due to invasion of mediastinum.     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 Scattered solid pulmonary nodules measuring up to 3 mm in size for which short-term imaging follow-up is recommended. Centrilobular emphysematous lung architecture.  21 x 22 mm hypodense area within the medial segment of the left hepatic lobe abutting the interlobar " "fissure.  While it is possible that this represents focal fatty infiltration, an underlying solitary liver lesion or focus of metastatic disease cannot be excluded.  The patient's recent PET-CT scan showed no abnormal FDG hypermetabolism in this vicinity.  Consider additional investigation with contrast enhanced MRI of the liver. Stable 26 x 39 mm left adnexal hypodensity which show no FDG hypermetabolism at the time of the patient's recent PET-CT scan. Tiny fat containing umbilical hernia. 12 mm short axis right paratracheal lymph node which showed no FDG hypermetabolism at the time of the patient's recent PET-CT scan and which remains unchanged in size since that study"  Follow-up MRI abdomen on 3/3/2022 reveals "Hypoattenuating area on prior CT near the falciform ligament corresponds to an area of focal fatty infiltration.  No suspicious liver lesions are present"          She completed definitive chemo/RT with weekly Carboplatin and Taxo (4/14/2022) and completed Radiation on 4/26/2022  CT scans from 5/10/2022 "Interval decrease in the size of the left upper lobe tumor and precarinal mediastinal lymph node. Interval decrease in the extent of the consolidation at the base of the left lower lobe. Chronic findings of pulmonary emphysema and bilateral renal cysts"  She started consolidation Imfinzi on 5/12/2022       [facility]  Im[facility]zi.  She denies any nausea, vomiting, diarrhea, constipation, abdominal pain, weight loss or loss of appetite, chest pain, shortness of breath, leg swelling, fatigue, pain, headache, dizziness, or mood changes. Her ECOG PS is zero. She is by herself    Review of Systems   Constitutional:  Negative for appetite change, fatigue and unexpected weight change.   HENT:  Negative for mouth sores.    Eyes:  Negative for visual disturbance.   Respiratory:  Negative for cough and shortness of breath.    Cardiovascular:  Negative for chest pain.   Gastrointestinal:  Negative " for abdominal pain and diarrhea.   Genitourinary:  Negative for frequency.   Musculoskeletal:  Negative for back pain.   Integumentary:  Negative for rash.   Neurological:  Negative for headaches.   Hematological:  Negative for adenopathy.   Psychiatric/Behavioral:  The patient is not nervous/anxious.    All other systems reviewed and are negative.      Objective:      Physical Exam  Vitals reviewed.   Constitutional:       Appearance: She is well-developed.   HENT:      Mouth/Throat:      Pharynx: No oropharyngeal exudate.   Cardiovascular:      Rate and Rhythm: Normal rate.      Heart sounds: Normal heart sounds.   Pulmonary:      Effort: Pulmonary effort is normal.      Breath sounds: Normal breath sounds. No wheezing.   Abdominal:      General: Bowel sounds are normal.      Palpations: Abdomen is soft.      Tenderness: There is no abdominal tenderness.   Musculoskeletal:         General: No tenderness.   Lymphadenopathy:      Cervical: No cervical adenopathy.   Skin:     General: Skin is warm and dry.      Findings: No rash.   Neurological:      Mental Status: She is alert and oriented to person, place, and time.      Coordination: Coordination normal.   Psychiatric:         Thought Content: Thought content normal.         Judgment: Judgment normal.         LABS:  WBC   Date Value Ref Range Status   09/20/2022 10.65 3.90 - 12.70 K/uL Final     Hemoglobin   Date Value Ref Range Status   09/20/2022 11.7 (L) 12.0 - 16.0 g/dL Final     Hematocrit   Date Value Ref Range Status   09/20/2022 38.2 37.0 - 48.5 % Final     Platelets   Date Value Ref Range Status   09/20/2022 288 150 - 450 K/uL Final     Gran # (ANC)   Date Value Ref Range Status   09/20/2022 8.4 (H) 1.8 - 7.7 K/uL Final     Comment:     The ANC is based on a white cell differential from an   automated cell counter. It has not been microscopically   reviewed for the presence of abnormal cells. Clinical   correlation is required.         Chemistry         Component Value Date/Time     09/20/2022 0814    K 4.6 09/20/2022 0814     09/20/2022 0814    CO2 26 09/20/2022 0814    BUN 12 09/20/2022 0814    CREATININE 1.2 09/20/2022 0814     (H) 09/20/2022 0814        Component Value Date/Time    CALCIUM 9.2 09/20/2022 0814    ALKPHOS 90 09/20/2022 0814    AST 13 09/20/2022 0814    ALT 11 09/20/2022 0814    BILITOT 0.4 09/20/2022 0814    ESTGFRAFRICA >60 07/27/2022 0858    EGFRNONAA >60 07/27/2022 0858          Assessment:       Problem List Items Addressed This Visit       Cancer associated pain    Cancer of upper lobe of left lung - Primary    Relevant Orders    CBC W/ AUTO DIFFERENTIAL    Comprehensive Metabolic Panel    Secondary malignancy of anterior mediastinum    Type 2 diabetes mellitus without complication    Relevant Medications    metFORMIN (GLUCOPHAGE) 1000 MG tablet     Other Visit Diagnoses       Hypothyroidism, unspecified type        Relevant Orders    TSH    FREE T4            Plan:         Route Chart for Scheduling    Med Onc Chart Routing      Follow up with physician 4 weeks. Schedule CBC,CMp, TSH, free T4 and see me and for Imfinzi   Follow up with CLARISSE    Infusion scheduling note    Injection scheduling note    Labs    Imaging    Pharmacy appointment    Other referrals        Treatment Plan Information   OP DURVALUMAB 1500 MG Q4W   Mere Styles MD   Upcoming Treatment Dates - OP DURVALUMAB 1500 MG Q4W    9/20/2022       Chemotherapy       durvalumab (IMFINZI) 1,500 mg in sodium chloride 0.9% 280 mL chemo infusion  10/18/2022       Chemotherapy       durvalumab (IMFINZI) 1,500 mg in sodium chloride 0.9% 280 mL chemo infusion  11/15/2022       Chemotherapy       durvalumab (IMFINZI) 1,500 mg in sodium chloride 0.9% 280 mL chemo infusion  12/13/2022       Chemotherapy       durvalumab (IMFINZI) 1,500 mg in sodium chloride 0.9% 280 mL chemo infusion    Supportive Plan Information  OP SHELL SUPPORTIVE   Mere Styles MD   Upcoming  Treatment Dates - OP SHELL SUPPORTIVE    No upcoming days in selected categories.       She is doing well clinically and is tolerating treatment well and will proceed with Imfinzi and will return in 4 weeks for next treatment. Scans will be in November 2022    She continues to smoke occasionally, re discussed the importance of quitting  DM: takes Metformin, notes has not taken in the last week, and is non fasting. 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

## 2022-09-20 NOTE — PROGRESS NOTES
Oncology Nutrition   Teresa Earl   1950    [employer]     Pt eligible for [employer] . Order filled out with patient at chairside. All patient questions or concerns regarding  and/or nutrition status addressed. RD to continue to monitor prn and provide patient food while under active treatment.     Food order filled by this RD- will provide to patient at end of infusion today.    Millie Car, JANISN, LDN  09/20/2022  10:54 AM

## 2022-10-10 NOTE — PROGRESS NOTES
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 Styles  - currently on disease directed therapy    Encounter for palliative care/Advanced care planning  - patient decisional  - patient accompanied by her daughter today during visit  - ACP documents uploaded into EMR  - HCPOA: Chela Anthony at [phone]  - philosophy of Palliative Medicine reviewed with patient at first visit by Dr. Shah and reviewed again with patient and daughter today  - new patient folder given to patient at first visit with Dr. Shah  - goals: life prolonging  - code status not specifically discussed today    Cancer associated pain  - patient reporting continued moderate to severe pain in her chest as well as under her left arm. Patient reporting pain has increased since last visit  - patient reports mild to moderate relief relief with current regimen of oxycodone 20 mg q4h prn and methadone 5 mg TID  - patient is currently using all six doses of breakthrough medications  - patient has noted improvement initially but now states the pain is worsening again. Patient's daughter states that patient will be rubbing her chest and under arm subconsciously and seems to having increased pain recently  - increase methadone to 10 mg BID today  - continue short acting medication of oxycodone 20 mg q4h prn  - patient does find benefit with lidocaine patches as well; continue at this time  - EKG on 12/13/21 is 425 msec; EKG to be scheduled on 10/18/22  - opioid safety sheet in new patient folder for patient to review  - opioid education provided at first visit with this provider  - narcan previously prescribed  - will continue to monitor    Cervical radiculopathy  - patient reporting severe pain in her neck and arms and  weakness in arms/legs  - patient has been using gabapentin 800 mg BID (not tid as prescribed) due to side effect of somnolence  - this dose is providing relief from the radiculopathy at this time  - patient has family staying with her around the clock as well to assist patient  - will continue close monitoring    Nausea/Anorexia  - patient reporting moderate nausea. She is also having moderate anorexia  - patient states she has good relief from antiemetic regimen at this time.   - she obtains minimal benefit from zofran; continue compazine 5 mg q6h prn  - she uses phenergan at night due to help her sleep  - at last visit, discussed with patient should try to eat prior to taking pain medications as well to help with nausea  - patient states that she eats at least one good meal a day  - she does snack throughout the day as well  - no need for pharmacologic intervention at this time  - will continue close monitoring    Insomnia/Neoplasm related fatigue  - patient having intermittent trouble sleeping  - she uses phenergan to help with sleeping and nausea at night  - patient feeling increased fatigued  - discussed setting goals for the day for patient to accomplish  - fatigue is worse with worsening pain  - patient's daughter also thinks that patient is bored, which is contributing to fatigue  - will work on better symptom control as mentioned above  - tips for better sleep in new patient folder for patient to review    Dyspnea  - patient having mild dyspnea  - per daughter, she sleeps with O2 at night  - continue inhalers as prescribed by other physicians  - tip sheet for shortness of breath in new patient folder for patient to review  - will continue to monitor    Adjustment disorder with anxiety and depression  - patient having both anxiety and depression  - patient spoke of concerns around worsening symptoms and what that means for her her cancer  - patient's daughter also expressing concern that patient is bored  during the day at home  - discussed activities that patient may engage in to help with mental and physical stimulation  - emotional support provided today  - continue fluoxetine 60 mg daily    Understanding of illness/Prognosis: patient and family have fair understanding of her disease; prognosis is guarded at this time     Goals of care: life prolonging    Follow up: ~ 8 weeks    Patient's encounter and above plan of care discussed with patient's oncologist    SUBJECTIVE:     History of Present Illness:  Patient is a 72 y.o. year old female with DM, HTN, neuropathy, and upper lobe of left lung carcinoma presents to Palliative Medicine for symptom management and GOC. Please see oncology notes for full details of oncologic history and treatment course.     10/10/2022:  LA  reviewed and summarized:  09/30/2022 Oxycodone 20 mg Disp: 180 for 30 days  09/17/2022 Gabapentin 800 mg Disp: 90 for 30 days  09/15/2022 Methadone 5 mg Disp: 90 for 30 days    Today patient states she continues to have worsening pain in her chest and under her left arm. Patient states that the areas are tender to touch. She has noted increased swelling under her arm. Patient having some dyspnea. She sleeps with oxygen at night. Patient having nausea that his controlled with current regimen. Patient also eating at least one good meal a day, though she does snack. Patient having worsening fatigue and intermittent insomnia. Patient's phenergan helps with nausea and insomnia at night. She finds herself with worsening anxiety/depression. Her daughter believes this is due to patient being bored at home at times.     04/20/2022:  LA  reviewed and summarized:  04/11/2022 Methadone 5 mg Disp: 30 for 15 days  04/09/2022 Oxycodone 10 mg Disp: 100 for 17 days    Today patient states she continues to have severe pain in her neck and arms. Her current regimen is only providing minimal relief at this time. Daughter has noticed mild improvement in pain  since starting long acting medication. Patient also feeling jittery from the steroids. She has continued nausea. She is also having some difficulty sleeping. Patient has good support at home with her children and family rotating to stay with her at all times.     03/24/2022:  Initial visit held with Dr. Shah. Please see her note for full details of encounter.    Past Medical History:   Diagnosis Date    Acute kidney injury     Cervical radiculopathy     Cigarette smoker 1/26/2022    Diabetes mellitus     Hypertension     Mass of upper lobe of left lung 1/26/2022    Neuropathy     Rhabdomyolysis      Past Surgical History:   Procedure Laterality Date    BILATERAL TUBAL LIGATION      ENDOBRONCHIAL ULTRASOUND N/A 12/13/2021    Procedure: ENDOBRONCHIAL ULTRASOUN;  Surgeon: William Padgett Jr., DO;  Location: [facility];  Service: Pulmonary;  Laterality: N/A;    HYSTERECTOMY      INSERTION OF TUNNELED CENTRAL VENOUS CATHETER (CVC) WITH SUBCUTANEOUS PORT N/A 3/9/2022    Procedure: [id number];  Surgeon: Jos Solitario MD;  Location: [facility];  Service: General;  Laterality: N/A;    NAVIGATIONAL BRONCHOSCOPY Left 12/13/2021    Procedure: BRONCHOSCOPY, NAVIGATIONAL;  Surgeon: William Padgett Jr., DO;  Location: [facility];  Service: Pulmonary;  Laterality: Left;     Family History   Problem Relation Age of Onset    Valvular heart disease Mother      Review of patient's allergies indicates:   Allergen Reactions    Codeine Hives and Other (See Comments)     Other reaction(s): Unknown  Other reaction(s): Unknown      Ibuprofen Swelling    Morphine Diarrhea and Other (See Comments)     Other reaction(s): Renal Dysfunctions  Other reaction(s): Renal Dysfunctions         Medications:    Current Outpatient Medications:     albuterol (PROVENTIL/VENTOLIN HFA) 90 mcg/actuation inhaler, Inhale 2 puffs into the lungs every 6 (six) hours as needed for Wheezing or Shortness of Breath. Rescue, Disp: 1 g, Rfl: 5    BREO ELLIPTA  100-25 mcg/dose diskus inhaler, Inhale 2 puffs into the lungs once daily., Disp: , Rfl:     diclofenac (VOLTAREN) 75 MG EC tablet, Take 75 mg by mouth 2 (two) times daily., Disp: , Rfl:     duke's soln (benadryl 30 mL, mylanta 30 mL, LIDOcaine 30 mL, nystatin 30 mL) 120mL, Take 10 mLs by mouth 4 (four) times daily., Disp: 120 mL, Rfl: 1    empagliflozin (JARDIANCE) 25 mg tablet, Take 1 tablet by mouth once daily., Disp: , Rfl:     FLUoxetine 20 MG capsule, TAKE TWO CAPSULES BY MOUTH EVERY MORNING, Disp: 30 capsule, Rfl: 2    FLUoxetine 40 MG capsule, Take 1 capsule by mouth once daily., Disp: , Rfl:     fluticasone furoate-vilanteroL (BREO ELLIPTA) 100-25 mcg/dose diskus inhaler, Inhale 1 puff into the lungs., Disp: , Rfl:     gabapentin (NEURONTIN) 300 MG capsule, Take 300 mg by mouth every morning., Disp: , Rfl:     gabapentin (NEURONTIN) 800 MG tablet, Take 800 mg by mouth 3 (three) times daily., Disp: , Rfl:     levoFLOXacin (LEVAQUIN) 750 MG tablet, Take 750 mg by mouth once daily., Disp: , Rfl:     LIDOcaine-prilocaine (EMLA) cream, Apply topically as needed (Prior to port access)., Disp: 30 g, Rfl: 0    lisinopriL (PRINIVIL,ZESTRIL) 2.5 MG tablet, Take 2.5 mg by mouth once daily., Disp: , Rfl:     lovastatin (MEVACOR) 10 MG tablet, Take 10 mg by mouth nightly., Disp: , Rfl:     metformin (GLUCOPHAGE) 1000 MG tablet, Take 1,000 mg by mouth 2 (two) times daily with meals., Disp: , Rfl:     metFORMIN (GLUCOPHAGE) 1000 MG tablet, Take 1 tablet by mouth 2 (two) times daily with meals., Disp: , Rfl:     methadone (DOLOPHINE) 5 MG tablet, Take 1 tablet (5 mg total) by mouth 3 (three) times daily., Disp: 90 tablet, Rfl: 0    mupirocin (BACTROBAN) 2 % ointment, , Disp: , Rfl:     naloxone (NARCAN) 4 mg/actuation Spry, 4mg by nasal route as needed for opioid overdose; may repeat every 2-3 minutes in alternating nostrils until medical help arrives. Call 911, Disp: 1 each, Rfl: 11    ondansetron (ZOFRAN) 8 MG tablet,  Take 1 tablet (8 mg total) by mouth daily as needed for Nausea., Disp: 40 tablet, Rfl: 1    oxyCODONE (ROXICODONE) 20 mg Tab immediate release tablet, Take 1 tablet (20 mg total) by mouth every 4 (four) hours as needed for Pain., Disp: 180 tablet, Rfl: 0    predniSONE (DELTASONE) 5 MG tablet, Take 1 tablet (5 mg total) by mouth once daily. (Patient not taking: Reported on 9/20/2022), Disp: 30 tablet, Rfl: 0    prochlorperazine (COMPAZINE) 5 MG tablet, Take 1 tablet (5 mg total) by mouth every 6 (six) hours as needed for Nausea., Disp: 60 tablet, Rfl: 2    promethazine (PHENERGAN) 12.5 MG Tab, Take 1 tablet (12.5 mg total) by mouth every 6 (six) hours as needed (N/V)., Disp: 30 tablet, Rfl: 0    sulfamethoxazole-trimethoprim 800-160mg (BACTRIM DS) 800-160 mg Tab, Take 1 tablet by mouth 2 (two) times daily., Disp: , Rfl:     OBJECTIVE:       ROS:  Review of Systems   Constitutional:  Positive for activity change, appetite change and fatigue.   HENT: Negative.     Eyes: Negative.    Respiratory:  Positive for shortness of breath.    Cardiovascular:  Positive for chest pain (chest wall pain).   Gastrointestinal:  Positive for nausea. Negative for constipation and diarrhea.   Genitourinary: Negative.    Musculoskeletal:  Positive for arthralgias, myalgias and neck pain.   Skin: Negative.    Neurological:  Positive for weakness.   Psychiatric/Behavioral:  Positive for dysphoric mood and sleep disturbance. The patient is nervous/anxious.    All other systems reviewed and are negative.    Review of Symptoms      Symptom Assessment (ESAS 0-10 Scale)  Pain:  7  Dyspnea:  3  Anxiety:  4  Nausea:  3  Depression:  4  Anorexia:  6  Fatigue:  10  Insomnia:  4  Restlessness:  0  Agitation:  0     CAM / Delirium:  Negative  Constipation:  Negative  Diarrhea:  Negative    Anxiety:  Is nervous/anxious  Constipation:  No constipation    Bowel Management Plan (BMP):  No      Pain Assessment:  OME in 24 hours:  195  Location(s): neck and  chest    Neck       Location: posterior        Quality: Burning, tingling and aching        Quantity: 7/10 in intensity        Chronicity: Onset 6 month(s) ago, unchanged        Aggravating Factors: None        Alleviating Factors: Opiates        Associated Symptoms: Myalgias  Chest       Location: left        Quality: Aching        Quantity: 7/10 in intensity        Chronicity: Onset 3 month(s) ago, unchanged        Aggravating Factors: None        Alleviating Factors: Opiates        Associated Symptoms: None    Modified Ximena Scale:  1    ECOG Performance Status ndGndrndanddndend:nd nd2nd Living Arrangements:  Lives with family    Psychosocial/Cultural: Retired; lives in home with daughter, significant other and grandson/granddaughter.  Three children; Chela GARDNER and José Miguel Earl (son) live in [place], daughter Li in [place]. Patient's mother in [facility] in [place].    Advance Care Planning   Advance Directives:   Living Will: No    LaPOST: No    Do Not Resuscitate Status: No    Medical Power of : Yes    Agent's Name:  Chela Anthony   Agent's Contact Number:  [phone]    Decision Making:  Patient answered questions and Family answered questions  Goals of Care: The patient and family endorses that what is most important right now is to focus on symptom/pain control    Accordingly, we have decided that the best plan to meet the patient's goals includes continuing with treatment        Physical Exam:  Vitals:    Vitals:    10/10/22 1140   BP: 138/86   Pulse: 104     Physical Exam  Constitutional:       General: She is not in acute distress.     Appearance: She is not toxic-appearing.   HENT:      Head: Normocephalic and atraumatic.      Right Ear: External ear normal.      Left Ear: External ear normal.   Eyes:      General: No scleral icterus.        Right eye: No discharge.         Left eye: No discharge.   Neck:      Comments: Trachea midline  Pulmonary:      Effort: Pulmonary effort is normal. No  "respiratory distress.   Abdominal:      General: Abdomen is flat. There is no distension.   Musculoskeletal:         General: No signs of injury.      Cervical back: Normal range of motion.      Right lower leg: No edema.      Left lower leg: No edema.   Skin:     General: Skin is dry.      Coloration: Skin is not jaundiced.      Findings: No erythema.   Neurological:      Mental Status: She is alert and oriented to person, place, and time.      Gait: Gait normal.   Psychiatric:         Mood and Affect: Mood is anxious.         Behavior: Behavior normal.         Thought Content: Thought content normal.         Cognition and Memory: She exhibits impaired recent memory.         Judgment: Judgment normal.       Labs:  CBC:   WBC   Date Value Ref Range Status   09/20/2022 10.65 3.90 - 12.70 K/uL Final     Hemoglobin   Date Value Ref Range Status   09/20/2022 11.7 (L) 12.0 - 16.0 g/dL Final     Hematocrit   Date Value Ref Range Status   09/20/2022 38.2 37.0 - 48.5 % Final     MCV   Date Value Ref Range Status   09/20/2022 92 82 - 98 fL Final     Platelets   Date Value Ref Range Status   09/20/2022 288 150 - 450 K/uL Final       LFT:   Lab Results   Component Value Date    AST 13 09/20/2022    ALKPHOS 90 09/20/2022    BILITOT 0.4 09/20/2022       Albumin:   Albumin   Date Value Ref Range Status   09/20/2022 3.1 (L) 3.5 - 5.2 g/dL Final     Protein:   Total Protein   Date Value Ref Range Status   09/20/2022 7.2 6.0 - 8.4 g/dL Final       Radiology:I have reviewed all pertinent imaging results/findings within the past 24 hours.    08/15/2022 CT C/A/P: "Left upper lobe mass previously seen encasing the left brachiocephalic vein measured at 4.7 x 2.7 cm today versus 4.2 x 2.6 cm previously but when directly compared is not thought significantly changed.  Left brachiocephalic vein not opacified today with injection into the right arm.  Precarinal node not significantly changed, 7 x 17 mm today, 9 x 17 mm previously.  5 mm " "nodular density right lateral apex more prominent today.     Within the abdomen and pelvis nodular thickening of the left adrenal gland is not significantly changed compared to the prior exams.  Area of decreased density liver suggesting focal fatty change near the falciform corresponds as seen on prior exam and MRI of 03/03/2022 with no hepatic masses suspicious for metastatic disease. Additional findings as detailed above including renal cysts"    08/15/2022 MRI Brain: "1.  There is no acute abnormality.  There is no hemorrhage, mass/mass effect, acute infarction.  There is no pathologic enhancement.  There is no evidence of intracranial metastatic disease in this patient with a provided history of lung cancer. 2.  There is a known heterogeneously enhancing mass in the posterior aspect of the left parotid gland."      Signature: Janet Angel MD      "

## 2022-10-13 NOTE — TELEPHONE ENCOUNTER
----- Message from Leonarda Marsh RN sent at 10/13/2022  9:28 AM CDT -----  Please contact patient to schedule 2 ultrasounds, LUE and neck---  Due asap  ~Leonarda

## 2022-10-14 NOTE — TELEPHONE ENCOUNTER
----- Message from Sri Garrett sent at 10/14/2022  8:53 AM CDT -----  Type: Needs Medical Advice  Who Called:  Patient   Symptoms (please be specific):    How long has patient had these symptoms:    Pharmacy name and phone #:    Best Call Back Number: [phone]  Additional Information: Patient is requesting a call back to get an EKG scheduled on 10/17.

## 2022-10-14 NOTE — TELEPHONE ENCOUNTER
I don't see where dr dickens needs an EKG, but I did notice an EKG ordered by dr oseguera.    Please contact patient if she needs the EKG or let me know if you know nothing about her needing one (I will call her then).      Message routed to dr oseguera

## 2022-10-18 NOTE — PROGRESS NOTES
Oncology Nutrition   Teresa Earl   1950    [employer]     Pt eligible for [employer] . Order filled out with patient at chairside. All patient questions or concerns regarding  and/or nutrition status addressed. RD to continue to monitor prn and provide patient food while under active treatment.     Food order filled by this RD- will provide to patient at end of infusion today.    Millie Car, JANISN, LDN  10/18/2022  11:00 AM

## 2022-10-18 NOTE — PLAN OF CARE
Problem: Adult Inpatient Plan of Care  Goal: Patient-Specific Goal (Individualized)  Outcome: Ongoing, Progressing  Flowsheets (Taken 10/18/2022 1027)  Anxieties, Fears or Concerns: none voiced  Individualized Care Needs: recliner  Patient-Specific Goals (Include Timeframe): no s/s infection during txt     Problem: Fatigue  Goal: Improved Activity Tolerance  Intervention: Promote Improved Energy  Flowsheets (Taken 10/18/2022 1027)  Fatigue Management: frequent rest breaks encouraged  Sleep/Rest Enhancement:   natural light exposure provided   noise level reduced  Activity Management:   Ambulated -L4   Ambulated in morrison - L4

## 2022-10-18 NOTE — PROGRESS NOTES
12:25 PM    [facility]. The pt reported her daughter, Li bringing her today, but left to go get lunch for them and will be back to sit with her for the rest of her treatment.    Overall this pt was in good spirits and reported no other needs at this time.

## 2022-10-18 NOTE — PROGRESS NOTES
"Subjective:       Patient ID: Teresa Earl is a 72 y.o. female.    Chief Complaint: Cancer of upper lobe of left lung  Mrs. Earl is a 72 year old female who underwent a chest xray in September 2022 prior to a discectomy and that showed a lung mass.      She then underwent CT chest also on September 2022 which showed spiculated mass in anterior segment GRACIELA, multiple borderline/enlarged mediastinal lymph nodes.     PFT's on 11/5/21 revealed FVC 1.96 (61%), FEV1 1.47 (60%), TLC 73%, DLCO 9.18 (47%)     PET/CT on 11/11/2022 reveals  FDG avid, spiculated pleural based mass in the GRACIELA extending into the anterior mediastinum, measuring 3.5cm. SUV 8.5. no enlarged or FDG avid lymphadenopathy in chest. No distant mets but FDG avid L parotid lesion     Navigational bronch on 12/13/2021 revealed "no endobronchial disease medial GRACIELA sampled. EBUS for station 4L, 7, 11. Path: GRACIELA suspicious but not diagnostic for malignancy. 4L, 7, 11L negative for malignancy"     On 1/25/22: he met with CT surgery and is felt not to be a candidate for resection due to invasion of mediastinum.     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 Scattered solid pulmonary nodules measuring up to 3 mm in size for which short-term imaging follow-up is recommended. Centrilobular emphysematous lung architecture.  21 x 22 mm hypodense area within the medial segment of the left hepatic lobe abutting the interlobar " "fissure.  While it is possible that this represents focal fatty infiltration, an underlying solitary liver lesion or focus of metastatic disease cannot be excluded.  The patient's recent PET-CT scan showed no abnormal FDG hypermetabolism in this vicinity.  Consider additional investigation with contrast enhanced MRI of the liver. Stable 26 x 39 mm left adnexal hypodensity which show no FDG hypermetabolism at the time of the patient's recent PET-CT scan. Tiny fat containing umbilical hernia. 12 mm short axis right paratracheal lymph node which showed no FDG hypermetabolism at the time of the patient's recent PET-CT scan and which remains unchanged in size since that study"  Follow-up MRI abdomen on 3/3/2022 reveals "Hypoattenuating area on prior CT near the falciform ligament corresponds to an area of focal fatty infiltration.  No suspicious liver lesions are present"          She completed definitive chemo/RT with weekly Carboplatin and Taxo (4/14/2022) and completed Radiation on 4/26/2022  CT scans from 5/10/2022 "Interval decrease in the size of the left upper lobe tumor and precarinal mediastinal lymph node. Interval decrease in the extent of the consolidation at the base of the left lower lobe. Chronic findings of pulmonary emphysema and bilateral renal cysts"  She started consolidation Imfinzi on 5/12/2022          HPIShe comes in for cycle 5 of  Imfinzi    She denies any nausea, vomiting, diarrhea, constipation, abdominal pain, weight loss or loss of appetite, chest pain, shortness of breath, leg swelling, fatigue, pain, headache, dizziness, or mood changes. Her ECOG PS is zero. She is accompanied by her daughter.      Review of Systems   Constitutional:  Negative for appetite change, fatigue and unexpected weight change.   HENT:  Negative for mouth sores.    Eyes:  Negative for visual disturbance.   Respiratory:  Negative for cough and shortness of breath.    Cardiovascular:  Negative for chest pain. "   Gastrointestinal:  Negative for abdominal pain and diarrhea.   Genitourinary:  Negative for frequency.   Musculoskeletal:  Negative for back pain.   Integumentary:  Negative for rash.   Neurological:  Negative for headaches.   Hematological:  Negative for adenopathy.   Psychiatric/Behavioral:  The patient is not nervous/anxious.    All other systems reviewed and are negative.      Objective:      Physical Exam  Vitals reviewed.   Constitutional:       Appearance: She is well-developed.   HENT:      Mouth/Throat:      Pharynx: No oropharyngeal exudate.   Cardiovascular:      Rate and Rhythm: Normal rate.      Heart sounds: Normal heart sounds.   Pulmonary:      Effort: Pulmonary effort is normal.      Breath sounds: Normal breath sounds. No wheezing.   Abdominal:      General: Bowel sounds are normal.      Palpations: Abdomen is soft.      Tenderness: There is no abdominal tenderness.   Musculoskeletal:         General: No tenderness.   Lymphadenopathy:      Cervical: No cervical adenopathy.   Skin:     General: Skin is warm and dry.      Findings: No rash.   Neurological:      Mental Status: She is alert and oriented to person, place, and time.      Coordination: Coordination normal.   Psychiatric:         Thought Content: Thought content normal.         Judgment: Judgment normal.         LABS:  WBC   Date Value Ref Range Status   10/18/2022 10.22 3.90 - 12.70 K/uL Final     Hemoglobin   Date Value Ref Range Status   10/18/2022 12.0 12.0 - 16.0 g/dL Final     Hematocrit   Date Value Ref Range Status   10/18/2022 39.6 37.0 - 48.5 % Final     Platelets   Date Value Ref Range Status   10/18/2022 293 150 - 450 K/uL Final     Gran # (ANC)   Date Value Ref Range Status   10/18/2022 7.9 (H) 1.8 - 7.7 K/uL Final     Gran %   Date Value Ref Range Status   10/18/2022 77.0 (H) 38.0 - 73.0 % Final       Chemistry        Component Value Date/Time     10/18/2022 0846    K 5.2 (H) 10/18/2022 0846     10/18/2022 0846     CO2 25 10/18/2022 0846    BUN 12 10/18/2022 0846    CREATININE 1.2 10/18/2022 0846     (H) 10/18/2022 0846        Component Value Date/Time    CALCIUM 9.3 10/18/2022 0846    ALKPHOS 91 10/18/2022 0846    AST 10 10/18/2022 0846    ALT 6 (L) 10/18/2022 0846    BILITOT 0.4 10/18/2022 0846    ESTGFRAFRICA >60 07/27/2022 0858    EGFRNONAA >60 07/27/2022 0858          Assessment:       Problem List Items Addressed This Visit       Cancer associated pain    Cancer of upper lobe of left lung - Primary    Relevant Orders    CBC auto differential    Comprehensive Metabolic Panel    CT Soft Tissue Neck With Contrast    CT Chest Abdomen Pelvis With Contrast (xpd)    Secondary malignancy of anterior mediastinum    Type 2 diabetes mellitus without complication       Plan:         Route Chart for Scheduling    Med Onc Chart Routing      Follow up with physician 4 weeks. Schedule CBC,CMp, CT neck, chest, abdomen/pelvis and see me and for Imfinzi   Follow up with CLARISSE    Infusion scheduling note    Injection scheduling note    Labs    Imaging    Pharmacy appointment    Other referrals        Treatment Plan Information   OP DURVALUMAB 1500 MG Q4W   Mere Styles MD   Upcoming Treatment Dates - OP DURVALUMAB 1500 MG Q4W    10/18/2022       Chemotherapy       durvalumab (IMFINZI) 1,500 mg in sodium chloride 0.9% 280 mL chemo infusion  11/15/2022       Chemotherapy       durvalumab (IMFINZI) 1,500 mg in sodium chloride 0.9% 280 mL chemo infusion  12/13/2022       Chemotherapy       durvalumab (IMFINZI) 1,500 mg in sodium chloride 0.9% 280 mL chemo infusion  1/10/2023       Chemotherapy       durvalumab (IMFINZI) 1,500 mg in sodium chloride 0.9% 280 mL chemo infusion    Supportive Plan Information  OP SHELL SUPPORTIVE   Mere Styles MD   Upcoming Treatment Dates - OP SHELL SUPPORTIVE    No upcoming days in selected categories.     She is doing well clinically and will proceed with Imfinzi and will return in 4 weeks for next  treatment with restaging CT neck, chest, abdomen/pelvis    Above care plan was discussed with patient and accompanying daughter and all questions were addressed to their satisfaction

## 2022-10-18 NOTE — PLAN OF CARE
Pt tolerated Imfinzi infusion well.  No adverse reaction noted.   PAD flushed with NS and Heparin and de-accessed per protocol.  Patient left clinic in no acute distress.

## 2022-10-25 NOTE — PROGRESS NOTES
11:50 AM    This LCSW called this pt to inform pt of the Thanksgiving food baskets being distributed in November. This pt was interested in receiving a box and said her daughter, Iris can pick it up bc she works near the cancer center.

## 2022-11-15 NOTE — PROGRESS NOTES
Oncology Nutrition   Chemotherapy Infusion Visit    Nutrition Follow Up   RD briefly met w/ pt at chairside during infusion. Pt states her appetite has been good. Reports she has gained 5#. Pt denies any nutrition related side effects or any food intolerances. Pt potassium levels high. RD provided pt w/ list of high potassium foods to avoid.     Pt eligible for TFP order. Food order filled by this RD- will provide to patient at end of infusion today.     Wt Readings from Last 10 Encounters:   11/15/22 59 kg (130 lb 1.1 oz)   11/15/22 59 kg (130 lb 1.1 oz)   10/18/22 57 kg (125 lb 10.6 oz)   10/18/22 57 kg (125 lb 10.6 oz)   09/20/22 57.1 kg (125 lb 12.4 oz)   09/20/22 57.1 kg (125 lb 12.4 oz)   08/31/22 56.9 kg (125 lb 7.1 oz)   08/23/22 56.4 kg (124 lb 5.4 oz)   08/23/22 56.4 kg (124 lb 5.4 oz)   07/27/22 57.1 kg (125 lb 14.1 oz)       All other nutrition questions/concerns addressed as appropriate. Will continue to monitor prn throughout treatment.     Millie Car, JANISN, LDN  11/15/2022  11:26 AM

## 2022-11-15 NOTE — PROGRESS NOTES
"Subjective:       Patient ID: Teresa Earl is a 72 y.o. female.    Chief Complaint: Cancer of upper lobe of left lung  Mrs. Earl is a 72 year old female who underwent a chest xray in September 2022 prior to a discectomy and that showed a lung mass.      She then underwent CT chest also on September 2022 which showed spiculated mass in anterior segment GRACIELA, multiple borderline/enlarged mediastinal lymph nodes.     PFT's on 11/5/21 revealed FVC 1.96 (61%), FEV1 1.47 (60%), TLC 73%, DLCO 9.18 (47%)     PET/CT on 11/11/2022 reveals  FDG avid, spiculated pleural based mass in the GRACIELA extending into the anterior mediastinum, measuring 3.5cm. SUV 8.5. no enlarged or FDG avid lymphadenopathy in chest. No distant mets but FDG avid L parotid lesion     Navigational bronch on 12/13/2021 revealed "no endobronchial disease medial GRACIELA sampled. EBUS for station 4L, 7, 11. Path: GRACIELA suspicious but not diagnostic for malignancy. 4L, 7, 11L negative for malignancy"     On 1/25/22: he met with CT surgery and is felt not to be a candidate for resection due to invasion of mediastinum.     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 Scattered solid pulmonary nodules measuring up to 3 mm in size for which short-term imaging follow-up is recommended. Centrilobular emphysematous lung architecture.  21 x 22 mm hypodense area within the medial segment of the left hepatic lobe abutting the interlobar " "fissure.  While it is possible that this represents focal fatty infiltration, an underlying solitary liver lesion or focus of metastatic disease cannot be excluded.  The patient's recent PET-CT scan showed no abnormal FDG hypermetabolism in this vicinity.  Consider additional investigation with contrast enhanced MRI of the liver. Stable 26 x 39 mm left adnexal hypodensity which show no FDG hypermetabolism at the time of the patient's recent PET-CT scan. Tiny fat containing umbilical hernia. 12 mm short axis right paratracheal lymph node which showed no FDG hypermetabolism at the time of the patient's recent PET-CT scan and which remains unchanged in size since that study"  Follow-up MRI abdomen on 3/3/2022 reveals "Hypoattenuating area on prior CT near the falciform ligament corresponds to an area of focal fatty infiltration.  No suspicious liver lesions are present"          She completed definitive chemo/RT with weekly Carboplatin and Taxo (4/14/2022) and completed Radiation on 4/26/2022  CT scans from 5/10/2022 "Interval decrease in the size of the left upper lobe tumor and precarinal mediastinal lymph node. Interval decrease in the extent of the consolidation at the base of the left lower lobe. Chronic findings of pulmonary emphysema and bilateral renal cysts"  She started consolidation Imfinzi on 5/12/2022           HPIShe comes in for cycle 6 of  Imfinzi  She underwent restaging CT scans of neck on 11/10/2022 which reveals "No pathologically enlarged cervical lymph nodes or evidence of metastatic disease within the neck. Heterogeneously enhancing left parotid lesion, reportedly a biopsy-proven pleomorphic adenoma"  CT chest, abdomen on 11/10/2022 reveals  "Interval shrinkage of the anteromedial left upper lobe tumor.  There has also been a decrease in the size of a precarinal mediastinal lymph node. Interval disappearance of an irregular nodule in the lateral right upper lobe. Unchanged findings of bilateral " "renal cysts, pulmonary emphysema, and a left ovarian cyst"  She denies any nausea, vomiting, diarrhea, constipation, abdominal pain, weight loss or loss of appetite, chest pain, shortness of breath, leg swelling, fatigue, pain, headache, dizziness, or mood changes. Her ECOG PS is zero  She is accompanied by her daughter         Review of Systems   Constitutional:  Negative for appetite change, fatigue and unexpected weight change.   HENT:  Negative for mouth sores.    Eyes:  Negative for visual disturbance.   Respiratory:  Negative for cough and shortness of breath.    Cardiovascular:  Negative for chest pain.   Gastrointestinal:  Negative for abdominal pain and diarrhea.   Genitourinary:  Negative for frequency.   Musculoskeletal:  Negative for back pain.   Integumentary:  Negative for rash.   Neurological:  Negative for headaches.   Hematological:  Negative for adenopathy.   Psychiatric/Behavioral:  The patient is not nervous/anxious.    All other systems reviewed and are negative.      Objective:      Physical Exam  Vitals reviewed.   Constitutional:       Appearance: She is well-developed.   HENT:      Mouth/Throat:      Pharynx: No oropharyngeal exudate.   Cardiovascular:      Rate and Rhythm: Normal rate.      Heart sounds: Normal heart sounds.   Pulmonary:      Effort: Pulmonary effort is normal.      Breath sounds: Normal breath sounds. No wheezing.   Abdominal:      General: Bowel sounds are normal.      Palpations: Abdomen is soft.      Tenderness: There is no abdominal tenderness.   Musculoskeletal:         General: No tenderness.   Lymphadenopathy:      Cervical: No cervical adenopathy.   Skin:     General: Skin is warm and dry.      Findings: No rash.   Neurological:      Mental Status: She is alert and oriented to person, place, and time.      Coordination: Coordination normal.   Psychiatric:         Thought Content: Thought content normal.         Judgment: Judgment normal.         LABS:  WBC   Date " Value Ref Range Status   11/10/2022 9.36 3.90 - 12.70 K/uL Final     Hemoglobin   Date Value Ref Range Status   11/10/2022 12.4 12.0 - 16.0 g/dL Final     Hematocrit   Date Value Ref Range Status   11/10/2022 42.5 37.0 - 48.5 % Final     Platelets   Date Value Ref Range Status   11/10/2022 302 150 - 450 K/uL Final     Gran # (ANC)   Date Value Ref Range Status   11/10/2022 6.9 1.8 - 7.7 K/uL Final     Gran %   Date Value Ref Range Status   11/10/2022 73.7 (H) 38.0 - 73.0 % Final       Chemistry        Component Value Date/Time     11/15/2022 0907    K 5.7 (H) 11/15/2022 0907     11/15/2022 0907    CO2 28 11/15/2022 0907    BUN 10 11/15/2022 0907    CREATININE 1.0 11/15/2022 0907     (H) 11/15/2022 0907        Component Value Date/Time    CALCIUM 9.8 11/15/2022 0907    ALKPHOS 95 11/15/2022 0907    AST 10 11/15/2022 0907    ALT <5 (L) 11/15/2022 0907    BILITOT 0.4 11/15/2022 0907    ESTGFRAFRICA >60 2022 0858    EGFRNONAA >60 2022 0858        Ma.1     Assessment:       Problem List Items Addressed This Visit       Cancer of upper lobe of left lung - Primary    Relevant Orders    CBC auto differential    CMP    CMP    Secondary malignancy of anterior mediastinum    Type 2 diabetes mellitus without complication     Other Visit Diagnoses       Hyperkalemia        Hypothyroidism, unspecified type        Relevant Orders    TSH    FREE T4            Plan:         Route Chart for Scheduling    Med Onc Chart Routing      Follow up with physician . As scheduled   Follow up with CLARISSE    Infusion scheduling note    Injection scheduling note    Labs    Imaging    Pharmacy appointment    Other referrals        Treatment Plan Information   OP DURVALUMAB 1500 MG Q4W   Mere Styles MD   Upcoming Treatment Dates - OP DURVALUMAB 1500 MG Q4W    11/15/2022       Chemotherapy       durvalumab (IMFINZI) 1,500 mg in sodium chloride 0.9% 280 mL chemo infusion  2022       Chemotherapy        durvalumab (IMFINZI) 1,500 mg in sodium chloride 0.9% 280 mL chemo infusion  1/10/2023       Chemotherapy       durvalumab (IMFINZI) 1,500 mg in sodium chloride 0.9% 280 mL chemo infusion  2/7/2023       Chemotherapy       durvalumab (IMFINZI) 1,500 mg in sodium chloride 0.9% 280 mL chemo infusion    Supportive Plan Information  OP SHELL SUPPORTIVE   Mere Styles MD   Upcoming Treatment Dates - OP SHELL SUPPORTIVE    No upcoming days in selected categories.       She is doing very well, with continued improvement on scans. She will proceed with immunotherapy with Imfinzi and will return in 4 weeks for next treatment  Thyroid: check labs in 4 weeks  Hyperkalemia: recheck CMp today    Above care plan was discussed with patient and accompanying daughter and all questions were addressed to their satisfaction

## 2022-11-15 NOTE — PLAN OF CARE
Problem: Adult Inpatient Plan of Care  Goal: Patient-Specific Goal (Individualized)  Outcome: Ongoing, Progressing  Flowsheets (Taken 11/15/2022 1055)  Anxieties, Fears or Concerns: potassium elevated  Individualized Care Needs: recliner, conversation  Patient-Specific Goals (Include Timeframe): infection prevention during treatment     Problem: Fatigue  Goal: Improved Activity Tolerance  Intervention: Promote Improved Energy  Flowsheets (Taken 11/15/2022 1055)  Fatigue Management: frequent rest breaks encouraged  Sleep/Rest Enhancement:   natural light exposure provided   noise level reduced   room darkened  Activity Management:   Ambulated -L4   Ambulated in morrison - L4

## 2022-11-15 NOTE — PLAN OF CARE
Problem: Adult Inpatient Plan of Care  Goal: Plan of Care Review  Outcome: Ongoing, Progressing  Flowsheets (Taken 11/15/2022 1242)  Plan of Care Reviewed With: patient     Patient tolerated Imfinzi treatment well. Port flushed with blood return, heparin locked, and de-accessed. AVS provided and reviewed. Ambulates per self. No acute distress noted.

## 2022-11-15 NOTE — PROGRESS NOTES
11:39 AM    This LCSW met with this pt at chairside to check in and provide support. The pt reported her daughter, Wanda bringing her today. She shared that she has gained 5 pounds and her appetite has been good so far.  This pt was provided with a gas card, TFP and Thanksgiving food box today.  The pt expressed her gratitude for the gas card and said this helps her so much.  The pt reported no other needs at this time.

## 2022-12-12 NOTE — PROGRESS NOTES
"  Subjective:       Patient ID: Teresa Earl is a 72 y.o. female.    Chief Complaint: No chief complaint on file.  Mrs. Earl is a 72 year old female who underwent a chest xray in September 2022 prior to a discectomy and that showed a lung mass.      She then underwent CT chest also on September 2022 which showed spiculated mass in anterior segment GRACIELA, multiple borderline/enlarged mediastinal lymph nodes.     PFT's on 11/5/21 revealed FVC 1.96 (61%), FEV1 1.47 (60%), TLC 73%, DLCO 9.18 (47%)     PET/CT on 11/11/2022 reveals  FDG avid, spiculated pleural based mass in the GRACIELA extending into the anterior mediastinum, measuring 3.5cm. SUV 8.5. no enlarged or FDG avid lymphadenopathy in chest. No distant mets but FDG avid L parotid lesion     Navigational bronch on 12/13/2021 revealed "no endobronchial disease medial GRACIELA sampled. EBUS for station 4L, 7, 11. Path: GRACIELA suspicious but not diagnostic for malignancy. 4L, 7, 11L negative for malignancy"     On 1/25/22: he met with CT surgery and is felt not to be a candidate for resection due to invasion of mediastinum.     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 Scattered solid pulmonary nodules measuring up to 3 mm in size for which short-term imaging follow-up is recommended. Centrilobular emphysematous lung architecture.  21 x 22 mm hypodense area within the medial segment of the left hepatic lobe abutting the interlobar " "fissure.  While it is possible that this represents focal fatty infiltration, an underlying solitary liver lesion or focus of metastatic disease cannot be excluded.  The patient's recent PET-CT scan showed no abnormal FDG hypermetabolism in this vicinity.  Consider additional investigation with contrast enhanced MRI of the liver. Stable 26 x 39 mm left adnexal hypodensity which show no FDG hypermetabolism at the time of the patient's recent PET-CT scan. Tiny fat containing umbilical hernia. 12 mm short axis right paratracheal lymph node which showed no FDG hypermetabolism at the time of the patient's recent PET-CT scan and which remains unchanged in size since that study"  Follow-up MRI abdomen on 3/3/2022 reveals "Hypoattenuating area on prior CT near the falciform ligament corresponds to an area of focal fatty infiltration.  No suspicious liver lesions are present"          She completed definitive chemo/RT with weekly Carboplatin and Taxo (4/14/2022) and completed Radiation on 4/26/2022  CT scans from 5/10/2022 "Interval decrease in the size of the left upper lobe tumor and precarinal mediastinal lymph node. Interval decrease in the extent of the consolidation at the base of the left lower lobe. Chronic findings of pulmonary emphysema and bilateral renal cysts"  She started consolidation Imfinzi on 5/12/2022           HPI  She comes in for cycle 8 of durvalumab  She underwent restaging CT scans of neck on 11/10/2022 which reveals "No pathologically enlarged cervical lymph nodes or evidence of metastatic disease within the neck. Heterogeneously enhancing left parotid lesion, reportedly a biopsy-proven pleomorphic adenoma"  CT chest, abdomen on 11/10/2022 reveals  "Interval shrinkage of the anteromedial left upper lobe tumor.  There has also been a decrease in the size of a precarinal mediastinal lymph node. Interval disappearance of an irregular nodule in the lateral right upper lobe. Unchanged findings of " "bilateral renal cysts, pulmonary emphysema, and a left ovarian cyst"  She denies any nausea, vomiting, diarrhea, constipation, abdominal pain, weight loss or loss of appetite, chest pain, shortness of breath, leg swelling, fatigue, pain, headache, dizziness, or mood changes. Her ECOG PS is zero  She reports no dyspnea, diarrhea, fatigue, tolerating all her infusions very well.        Review of Systems   Constitutional:  Negative for appetite change, fatigue and unexpected weight change.   HENT:  Negative for mouth sores.    Eyes:  Negative for visual disturbance.   Respiratory:  Negative for cough and shortness of breath.    Cardiovascular:  Negative for chest pain.   Gastrointestinal:  Negative for abdominal pain and diarrhea.   Genitourinary:  Negative for frequency.   Musculoskeletal:  Negative for back pain.   Integumentary:  Negative for rash.   Neurological:  Negative for headaches.   Hematological:  Negative for adenopathy.   Psychiatric/Behavioral:  The patient is not nervous/anxious.    All other systems reviewed and are negative.      Objective:      Physical Exam  Vitals reviewed.   Constitutional:       Appearance: She is well-developed.   HENT:      Mouth/Throat:      Pharynx: No oropharyngeal exudate.   Cardiovascular:      Rate and Rhythm: Normal rate.      Heart sounds: Normal heart sounds.   Pulmonary:      Effort: Pulmonary effort is normal.      Breath sounds: Normal breath sounds. No wheezing.   Abdominal:      General: Bowel sounds are normal.      Palpations: Abdomen is soft.      Tenderness: There is no abdominal tenderness.   Musculoskeletal:         General: No tenderness.   Lymphadenopathy:      Cervical: No cervical adenopathy.   Skin:     General: Skin is warm and dry.      Findings: No rash.   Neurological:      Mental Status: She is alert and oriented to person, place, and time.      Coordination: Coordination normal.   Psychiatric:         Thought Content: Thought content normal.       "   Judgment: Judgment normal.         LABS:  WBC   Date Value Ref Range Status   2022 9.42 3.90 - 12.70 K/uL Final     Hemoglobin   Date Value Ref Range Status   2022 11.9 (L) 12.0 - 16.0 g/dL Final     Hematocrit   Date Value Ref Range Status   2022 40.0 37.0 - 48.5 % Final     Platelets   Date Value Ref Range Status   2022 246 150 - 450 K/uL Final     Gran # (ANC)   Date Value Ref Range Status   2022 7.6 1.8 - 7.7 K/uL Final     Gran %   Date Value Ref Range Status   2022 80.1 (H) 38.0 - 73.0 % Final       Chemistry        Component Value Date/Time     2022 1026    K 4.2 2022 1026     2022 1026    CO2 27 2022 1026    BUN 13 2022 1026    CREATININE 1.2 2022 1026     (H) 2022 1026        Component Value Date/Time    CALCIUM 8.9 2022 1026    ALKPHOS 90 2022 1026    AST 8 (L) 2022 1026    ALT <5 (L) 2022 1026    BILITOT 0.3 2022 1026    ESTGFRAFRICA >60 2022 0858    EGFRNONAA >60 2022 0858        Ma.1     Assessment:       Problem List Items Addressed This Visit    None        Plan:         Route Chart for Scheduling    Med Onc Chart Routing      Follow up with physician 4 weeks. please shedule with Dr. Styles or NP   Follow up with CLARISSE    Infusion scheduling note    Injection scheduling note    Labs CMP and CBC   Lab interval:     Imaging    Pharmacy appointment    Other referrals        Treatment Plan Information   OP DURVALUMAB 1500 MG Q4W   Mere Styles MD   Upcoming Treatment Dates - OP DURVALUMAB 1500 MG Q4W    2022       Chemotherapy       durvalumab (IMFINZI) 1,500 mg in sodium chloride 0.9% 280 mL chemo infusion  2023       Chemotherapy       durvalumab (IMFINZI) 1,500 mg in sodium chloride 0.9% 280 mL chemo infusion  2023       Chemotherapy       durvalumab (IMFINZI) 1,500 mg in sodium chloride 0.9% 280 mL chemo infusion  3/6/2023       Chemotherapy        durvalumab (IMFINZI) 1,500 mg in sodium chloride 0.9% 280 mL chemo infusion    Supportive Plan Information  OP SHELL SUPPORTIVE   Mere Styles MD   Upcoming Treatment Dates - OP SHELL SUPPORTIVE    No upcoming days in selected categories.       She is doing very well, with continued improvement on scans. She will proceed with immunotherapy with Durvalumab and will return in 4 weeks with Dr. Styles or NP for next treatment.  Thyroid: check labs in 4 weeks  Hyperkalemia: normal today     Above care plan was discussed with patient and all questions were addressed to their satisfaction

## 2022-12-12 NOTE — PROGRESS NOTES
2:27 PM    This LCSW met with this pt and her daughter at chairside to check in and provide support.  The pt reported doing well and reported no needs at this time.  This pt was provided with a gas card.

## 2022-12-12 NOTE — PLAN OF CARE
Pt tolerated Imfinzi well today. Reviewed follow-up appointments. All questions were answered, ambulated independently at d/c.

## 2022-12-12 NOTE — TELEPHONE ENCOUNTER
----- Message from Pedro Keen sent at 12/12/2022  7:05 AM CST -----  Name Of Caller: Teresa        Provider Name: Mere Styles        Does patient feel the need to be seen today? no        Relationship to the Pt?: patient        Contact Preference?: [phone]        What is the nature of the call?: Patient had an appointment scheduled with Dr Mere Styles at the [facility] on 12- for 2:40pm, patient states that she was not aware that she had this appointment and would like to get it rescheduled.  Patient would also like to know if she should still report to [facility] for her 9:30am appointment today 12-

## 2022-12-20 NOTE — TELEPHONE ENCOUNTER
Returned pt's call about increase in pain and decrease in effectiveness of pain medications. Informed her that I will relay message to Dr. Angel and call her back.

## 2022-12-28 NOTE — PROGRESS NOTES
"Established Patient - Audio Only Telehealth Visit     The patient location is: home/[place]  The chief complaint leading to consultation is: symptom management  Visit type: Virtual visit with audio only (telephone)  Total time spent with patient: 15 minutes       The reason for the audio only service rather than synchronous audio and video virtual visit was related to technical difficulties or patient preference/necessity.     Each patient to whom I provide medical services by telemedicine is:  (1) informed of the relationship between the physician and patient and the respective role of any other health care provider with respect to management of the patient; and (2) notified that they may decline to receive medical services by telemedicine and may withdraw from such care at any time. Patient verbally consented to receive this service via voice-only telephone call.       HPI: Ms. Earl is a 72 y.o. year old female with DM, HTN, neuropathy, and upper lobe of left lung carcinoma presents to Palliative Medicine for symptom management and GOC. Please see oncology notes for full details of oncologic history and treatment course.     Today patient states that she has been feeling "alireza rough". She states that she caught a cold that has made her feel more congested and initially with sore throat. Her sore throat has been improving. Unfortunately during this same time period, patient had to put her mother in a nursing home. With her symptoms, she has not been able to visit her mother since going into the NH. This has taken a big toll on patient's mental/emotional health. Patient has been feeling more fatigued. She has not been sleeping well. She is always thinking about her mother at night. Patient states that her chest pain is always present but it is tolerable. She does have some days that worse than others when she uses all six doses of breakthrough medication at that time. Patient feels that her cancer is growing. She still " has constant nausea. She takes her zofran with good relief of nausea. Patient's appetite is doing well, and she has gained about 5 lbs since previous visit. She reports seeing a different provider at last oncology visit. She does not remember much about what was said. She      Assessment and plan:    - continue methadone 10 mg TID and oxycodone 30 mg q4h prn  - refill for zofran sent today  - start trazodone 50 mg qhs for insomnia  - emotional support provided today  - patient has upcoming appointments with Dr. Styles to discuss her disease and treatment regimen in the beginning of January 2023.  - all questions and concerns were addressed.        This service was not originating from a related E/M service provided within the previous 7 days nor will  to an E/M service or procedure within the next 24 hours or my soonest available appointment.  Prevailing standard of care was able to be met in this audio-only visit.

## 2023-01-01 ENCOUNTER — HOSPITAL ENCOUNTER (OUTPATIENT)
Dept: RADIOLOGY | Facility: HOSPITAL | Age: 73
Discharge: HOME | End: 2023-01-27
Attending: INTERNAL MEDICINE
Payer: MEDICARE

## 2023-01-01 ENCOUNTER — DOCUMENTATION ONLY (OUTPATIENT)
Dept: INFUSION THERAPY | Facility: HOSPITAL | Age: 73
End: 2023-01-01
Payer: MEDICARE

## 2023-01-01 ENCOUNTER — TELEPHONE (OUTPATIENT)
Dept: HEMATOLOGY/ONCOLOGY | Facility: CLINIC | Age: 73
End: 2023-01-01
Payer: MEDICARE

## 2023-01-01 ENCOUNTER — PATIENT MESSAGE (OUTPATIENT)
Dept: PALLIATIVE MEDICINE | Facility: CLINIC | Age: 73
End: 2023-01-01
Payer: MEDICARE

## 2023-01-01 ENCOUNTER — DOCUMENTATION ONLY (OUTPATIENT)
Dept: INFUSION THERAPY | Facility: HOSPITAL | Age: 73
End: 2023-01-01

## 2023-01-01 ENCOUNTER — DOCUMENTATION ONLY (OUTPATIENT)
Dept: PHARMACY | Facility: AMBULARY SURGERY CENTER | Age: 73
End: 2023-01-01
Payer: MEDICARE

## 2023-01-01 ENCOUNTER — OFFICE VISIT (OUTPATIENT)
Dept: PALLIATIVE MEDICINE | Facility: CLINIC | Age: 73
End: 2023-01-01
Payer: MEDICARE

## 2023-01-01 ENCOUNTER — INFUSION (OUTPATIENT)
Dept: INFUSION THERAPY | Facility: HOSPITAL | Age: 73
End: 2023-01-01
Attending: INTERNAL MEDICINE
Payer: MEDICARE

## 2023-01-01 ENCOUNTER — OFFICE VISIT (OUTPATIENT)
Dept: HEMATOLOGY/ONCOLOGY | Facility: CLINIC | Age: 73
End: 2023-01-01
Payer: MEDICARE

## 2023-01-01 ENCOUNTER — TELEPHONE (OUTPATIENT)
Dept: PALLIATIVE MEDICINE | Facility: CLINIC | Age: 73
End: 2023-01-01

## 2023-01-01 ENCOUNTER — LAB VISIT (OUTPATIENT)
Dept: LAB | Facility: HOSPITAL | Age: 73
End: 2023-01-01
Attending: INTERNAL MEDICINE
Payer: MEDICARE

## 2023-01-01 ENCOUNTER — PATIENT MESSAGE (OUTPATIENT)
Dept: HEMATOLOGY/ONCOLOGY | Facility: CLINIC | Age: 73
End: 2023-01-01
Payer: MEDICARE

## 2023-01-01 ENCOUNTER — TELEPHONE (OUTPATIENT)
Dept: INFUSION THERAPY | Facility: HOSPITAL | Age: 73
End: 2023-01-01
Payer: MEDICARE

## 2023-01-01 ENCOUNTER — TELEPHONE (OUTPATIENT)
Dept: PALLIATIVE MEDICINE | Facility: CLINIC | Age: 73
End: 2023-01-01
Payer: MEDICARE

## 2023-01-01 ENCOUNTER — PATIENT MESSAGE (OUTPATIENT)
Dept: HEMATOLOGY/ONCOLOGY | Facility: CLINIC | Age: 73
End: 2023-01-01

## 2023-01-01 ENCOUNTER — HOSPITAL ENCOUNTER (OUTPATIENT)
Dept: RADIOLOGY | Facility: HOSPITAL | Age: 73
Discharge: HOME | End: 2023-05-09
Attending: INTERNAL MEDICINE
Payer: MEDICARE

## 2023-01-01 ENCOUNTER — TUMOR BOARD CONFERENCE (OUTPATIENT)
Dept: HEMATOLOGY/ONCOLOGY | Facility: CLINIC | Age: 73
End: 2023-01-01
Payer: MEDICARE

## 2023-01-01 ENCOUNTER — HOSPITAL ENCOUNTER (OUTPATIENT)
Dept: RADIOLOGY | Facility: HOSPITAL | Age: 73
Discharge: HOME | End: 2023-01-12
Attending: INTERNAL MEDICINE
Payer: MEDICARE

## 2023-01-01 VITALS
TEMPERATURE: 97 F | BODY MASS INDEX: 19.88 KG/M2 | DIASTOLIC BLOOD PRESSURE: 68 MMHG | HEART RATE: 74 BPM | HEIGHT: 68 IN | RESPIRATION RATE: 18 BRPM | SYSTOLIC BLOOD PRESSURE: 104 MMHG | OXYGEN SATURATION: 94 % | WEIGHT: 131.19 LBS

## 2023-01-01 VITALS
RESPIRATION RATE: 16 BRPM | SYSTOLIC BLOOD PRESSURE: 114 MMHG | OXYGEN SATURATION: 94 % | DIASTOLIC BLOOD PRESSURE: 72 MMHG | TEMPERATURE: 97 F | WEIGHT: 136.88 LBS | HEIGHT: 68 IN | HEART RATE: 90 BPM | BODY MASS INDEX: 20.75 KG/M2

## 2023-01-01 VITALS
DIASTOLIC BLOOD PRESSURE: 66 MMHG | HEART RATE: 92 BPM | TEMPERATURE: 98 F | SYSTOLIC BLOOD PRESSURE: 122 MMHG | RESPIRATION RATE: 16 BRPM | WEIGHT: 137.13 LBS | OXYGEN SATURATION: 95 % | BODY MASS INDEX: 20.78 KG/M2 | HEIGHT: 68 IN

## 2023-01-01 VITALS
RESPIRATION RATE: 20 BRPM | RESPIRATION RATE: 16 BRPM | SYSTOLIC BLOOD PRESSURE: 118 MMHG | TEMPERATURE: 97 F | HEART RATE: 120 BPM | HEIGHT: 68 IN | BODY MASS INDEX: 19.06 KG/M2 | SYSTOLIC BLOOD PRESSURE: 142 MMHG | OXYGEN SATURATION: 97 % | HEIGHT: 68 IN | HEART RATE: 97 BPM | OXYGEN SATURATION: 98 % | WEIGHT: 125.75 LBS | DIASTOLIC BLOOD PRESSURE: 90 MMHG | TEMPERATURE: 98 F | DIASTOLIC BLOOD PRESSURE: 88 MMHG | BODY MASS INDEX: 18.94 KG/M2 | WEIGHT: 125 LBS

## 2023-01-01 VITALS
TEMPERATURE: 97 F | WEIGHT: 131.19 LBS | BODY MASS INDEX: 19.88 KG/M2 | RESPIRATION RATE: 18 BRPM | SYSTOLIC BLOOD PRESSURE: 90 MMHG | HEART RATE: 69 BPM | DIASTOLIC BLOOD PRESSURE: 56 MMHG | HEIGHT: 68 IN

## 2023-01-01 VITALS
DIASTOLIC BLOOD PRESSURE: 55 MMHG | TEMPERATURE: 97 F | HEIGHT: 68 IN | OXYGEN SATURATION: 97 % | HEART RATE: 81 BPM | WEIGHT: 136.69 LBS | RESPIRATION RATE: 16 BRPM | BODY MASS INDEX: 20.72 KG/M2 | SYSTOLIC BLOOD PRESSURE: 102 MMHG

## 2023-01-01 VITALS
DIASTOLIC BLOOD PRESSURE: 82 MMHG | TEMPERATURE: 98 F | RESPIRATION RATE: 16 BRPM | HEART RATE: 88 BPM | WEIGHT: 140.44 LBS | SYSTOLIC BLOOD PRESSURE: 148 MMHG | HEIGHT: 68 IN | BODY MASS INDEX: 21.28 KG/M2

## 2023-01-01 VITALS
HEART RATE: 76 BPM | OXYGEN SATURATION: 96 % | SYSTOLIC BLOOD PRESSURE: 150 MMHG | RESPIRATION RATE: 16 BRPM | BODY MASS INDEX: 20.52 KG/M2 | HEIGHT: 68 IN | TEMPERATURE: 98 F | DIASTOLIC BLOOD PRESSURE: 82 MMHG | WEIGHT: 135.38 LBS

## 2023-01-01 VITALS
RESPIRATION RATE: 126 BRPM | HEART RATE: 78 BPM | RESPIRATION RATE: 16 BRPM | SYSTOLIC BLOOD PRESSURE: 124 MMHG | TEMPERATURE: 98 F | DIASTOLIC BLOOD PRESSURE: 73 MMHG | DIASTOLIC BLOOD PRESSURE: 94 MMHG | SYSTOLIC BLOOD PRESSURE: 151 MMHG | BODY MASS INDEX: 19.06 KG/M2 | HEART RATE: 94 BPM | WEIGHT: 125.75 LBS | WEIGHT: 138.25 LBS | HEIGHT: 68 IN | BODY MASS INDEX: 21.02 KG/M2 | TEMPERATURE: 98 F

## 2023-01-01 VITALS
SYSTOLIC BLOOD PRESSURE: 122 MMHG | TEMPERATURE: 98 F | WEIGHT: 135.38 LBS | HEART RATE: 80 BPM | HEIGHT: 68 IN | OXYGEN SATURATION: 96 % | RESPIRATION RATE: 16 BRPM | BODY MASS INDEX: 20.52 KG/M2 | DIASTOLIC BLOOD PRESSURE: 70 MMHG

## 2023-01-01 DIAGNOSIS — E03.9 HYPOTHYROIDISM, UNSPECIFIED TYPE: ICD-10-CM

## 2023-01-01 DIAGNOSIS — E86.0 DEHYDRATION: ICD-10-CM

## 2023-01-01 DIAGNOSIS — C34.12 MALIGNANT NEOPLASM OF UPPER LOBE OF LEFT LUNG: ICD-10-CM

## 2023-01-01 DIAGNOSIS — B37.0 ORAL CANDIDIASIS: ICD-10-CM

## 2023-01-01 DIAGNOSIS — Z79.899 IMMUNODEFICIENCY DUE TO DRUGS: ICD-10-CM

## 2023-01-01 DIAGNOSIS — C34.12 MALIGNANT NEOPLASM OF UPPER LOBE OF LEFT LUNG: Primary | ICD-10-CM

## 2023-01-01 DIAGNOSIS — E11.9 TYPE 2 DIABETES MELLITUS WITHOUT COMPLICATION, WITHOUT LONG-TERM CURRENT USE OF INSULIN: ICD-10-CM

## 2023-01-01 DIAGNOSIS — Z51.5 PALLIATIVE CARE BY SPECIALIST: ICD-10-CM

## 2023-01-01 DIAGNOSIS — G89.3 CANCER ASSOCIATED PAIN: ICD-10-CM

## 2023-01-01 DIAGNOSIS — C78.1 SECONDARY MALIGNANCY OF ANTERIOR MEDIASTINUM: ICD-10-CM

## 2023-01-01 DIAGNOSIS — C34.12 CANCER OF UPPER LOBE OF LEFT LUNG: ICD-10-CM

## 2023-01-01 DIAGNOSIS — Z71.89 ACP (ADVANCE CARE PLANNING): ICD-10-CM

## 2023-01-01 DIAGNOSIS — R11.2 NAUSEA AND VOMITING, UNSPECIFIED VOMITING TYPE: ICD-10-CM

## 2023-01-01 DIAGNOSIS — C34.12 CANCER OF UPPER LOBE OF LEFT LUNG: Primary | ICD-10-CM

## 2023-01-01 DIAGNOSIS — M25.531 RIGHT WRIST PAIN: ICD-10-CM

## 2023-01-01 DIAGNOSIS — G89.3 CANCER RELATED PAIN: Primary | ICD-10-CM

## 2023-01-01 DIAGNOSIS — G89.3 CANCER RELATED PAIN: ICD-10-CM

## 2023-01-01 DIAGNOSIS — D84.821 IMMUNODEFICIENCY DUE TO DRUGS: ICD-10-CM

## 2023-01-01 DIAGNOSIS — D72.829 LEUKOCYTOSIS, UNSPECIFIED TYPE: ICD-10-CM

## 2023-01-01 DIAGNOSIS — G47.00 INSOMNIA, UNSPECIFIED TYPE: ICD-10-CM

## 2023-01-01 DIAGNOSIS — C34.90 MALIGNANT NEOPLASM OF LUNG, UNSPECIFIED LATERALITY, UNSPECIFIED PART OF LUNG: Primary | ICD-10-CM

## 2023-01-01 LAB
ALBUMIN SERPL BCP-MCNC: 3.3 G/DL (ref 3.5–5.2)
ALP SERPL-CCNC: 87 U/L (ref 55–135)
ALT SERPL W/O P-5'-P-CCNC: 7 U/L (ref 10–44)
ANION GAP SERPL CALC-SCNC: 10 MMOL/L (ref 8–16)
AST SERPL-CCNC: 8 U/L (ref 10–40)
BILIRUB SERPL-MCNC: 0.3 MG/DL (ref 0.1–1)
BUN SERPL-MCNC: 20 MG/DL (ref 8–23)
CALCIUM SERPL-MCNC: 8.8 MG/DL (ref 8.7–10.5)
CHLORIDE SERPL-SCNC: 102 MMOL/L (ref 95–110)
CO2 SERPL-SCNC: 26 MMOL/L (ref 23–29)
CREAT SERPL-MCNC: 1.1 MG/DL (ref 0.5–1.4)
DNA RANGE(S) EXAMINED NAR: NORMAL
ERYTHROCYTE [DISTWIDTH] IN BLOOD BY AUTOMATED COUNT: 14.7 % (ref 11.5–14.5)
EST. GFR  (NO RACE VARIABLE): 53.4 ML/MIN/1.73 M^2
GENE DIS ANL INTERP-IMP: POSITIVE
GENE DIS ASSESSED: NORMAL
GENE MUT TESTED BLD/T: 6.3 M/MB
GLUCOSE SERPL-MCNC: 126 MG/DL (ref 70–110)
GLUCOSE SERPL-MCNC: 536 MG/DL (ref 70–110)
GLUCOSE SERPL-MCNC: >500 MG/DL (ref 70–110)
HCT VFR BLD AUTO: 38.2 % (ref 37–48.5)
HGB BLD-MCNC: 11.3 G/DL (ref 12–16)
IMM GRANULOCYTES # BLD AUTO: 0.03 K/UL (ref 0–0.04)
MCH RBC QN AUTO: 27.1 PG (ref 27–31)
MCHC RBC AUTO-ENTMCNC: 29.6 G/DL (ref 32–36)
MCV RBC AUTO: 92 FL (ref 82–98)
MSI CA SPEC-IMP: NORMAL
NEUTROPHILS # BLD AUTO: 8.1 K/UL (ref 1.8–7.7)
PLATELET # BLD AUTO: 275 K/UL (ref 150–450)
PMV BLD AUTO: 9.9 FL (ref 9.2–12.9)
POTASSIUM SERPL-SCNC: 4.5 MMOL/L (ref 3.5–5.1)
PROT SERPL-MCNC: 7.5 G/DL (ref 6–8.4)
RBC # BLD AUTO: 4.17 M/UL (ref 4–5.4)
REASON FOR STUDY: NORMAL
SODIUM SERPL-SCNC: 138 MMOL/L (ref 136–145)
TEMPUS FUSIONADDENDUM: NORMAL
TEMPUS LCA: NORMAL
TEMPUS PERTINENTNEGATIVES: NORMAL
TEMPUS PORTAL: NORMAL
TEMPUS TRIAL1: NORMAL
TEMPUS TRIAL2: NORMAL
TEMPUS TRIAL3: NORMAL
TEMPUS TRIALCOUNT: 3
WBC # BLD AUTO: 10.56 K/UL (ref 3.9–12.7)

## 2023-01-01 PROCEDURE — A9698 NON-RAD CONTRAST MATERIALNOC: HCPCS | Mod: PO | Performed by: INTERNAL MEDICINE

## 2023-01-01 PROCEDURE — 99214 OFFICE O/P EST MOD 30 MIN: CPT | Mod: PBBFAC,PN

## 2023-01-01 PROCEDURE — 25500020 PHARM REV CODE 255: Mod: PO | Performed by: INTERNAL MEDICINE

## 2023-01-01 PROCEDURE — 99214 PR OFFICE/OUTPT VISIT, EST, LEVL IV, 30-39 MIN: ICD-10-PCS | Mod: S$PBB,,,

## 2023-01-01 PROCEDURE — 99999 PR PBB SHADOW E&M-EST. PATIENT-LVL IV: ICD-10-PCS | Mod: PBBFAC,,, | Performed by: INTERNAL MEDICINE

## 2023-01-01 PROCEDURE — 99999 PR PBB SHADOW E&M-EST. PATIENT-LVL IV: ICD-10-PCS | Mod: PBBFAC,,,

## 2023-01-01 PROCEDURE — 25000003 PHARM REV CODE 250: Mod: PN | Performed by: INTERNAL MEDICINE

## 2023-01-01 PROCEDURE — 99497 ADVNCD CARE PLAN 30 MIN: CPT | Mod: S$PBB,,, | Performed by: NURSE PRACTITIONER

## 2023-01-01 PROCEDURE — 99214 OFFICE O/P EST MOD 30 MIN: CPT | Mod: PBBFAC,PN,25

## 2023-01-01 PROCEDURE — 71260 CT THORAX DX C+: CPT | Mod: TC,PO

## 2023-01-01 PROCEDURE — 36415 COLL VENOUS BLD VENIPUNCTURE: CPT | Mod: PN | Performed by: INTERNAL MEDICINE

## 2023-01-01 PROCEDURE — 99999 PR PBB SHADOW E&M-EST. PATIENT-LVL IV: CPT | Mod: PBBFAC,,, | Performed by: INTERNAL MEDICINE

## 2023-01-01 PROCEDURE — 99999 PR PBB SHADOW E&M-EST. PATIENT-LVL V: ICD-10-PCS | Mod: PBBFAC,,, | Performed by: INTERNAL MEDICINE

## 2023-01-01 PROCEDURE — 63600175 PHARM REV CODE 636 W HCPCS: Mod: JG,PN | Performed by: INTERNAL MEDICINE

## 2023-01-01 PROCEDURE — 99213 OFFICE O/P EST LOW 20 MIN: CPT | Mod: PBBFAC,25,PN | Performed by: INTERNAL MEDICINE

## 2023-01-01 PROCEDURE — 63600175 PHARM REV CODE 636 W HCPCS: Mod: PN | Performed by: INTERNAL MEDICINE

## 2023-01-01 PROCEDURE — 71260 CT THORAX DX C+: CPT | Mod: 26,,, | Performed by: RADIOLOGY

## 2023-01-01 PROCEDURE — 96413 CHEMO IV INFUSION 1 HR: CPT | Mod: PN

## 2023-01-01 PROCEDURE — 74177 CT CHEST ABDOMEN PELVIS WITH CONTRAST (XPD): ICD-10-PCS | Mod: 26,,, | Performed by: RADIOLOGY

## 2023-01-01 PROCEDURE — 70553 MRI BRAIN STEM W/O & W/DYE: CPT | Mod: TC,PO

## 2023-01-01 PROCEDURE — 99214 OFFICE O/P EST MOD 30 MIN: CPT | Mod: PBBFAC,PN | Performed by: INTERNAL MEDICINE

## 2023-01-01 PROCEDURE — 99215 OFFICE O/P EST HI 40 MIN: CPT | Mod: S$PBB,,, | Performed by: INTERNAL MEDICINE

## 2023-01-01 PROCEDURE — 96361 HYDRATE IV INFUSION ADD-ON: CPT | Mod: PN

## 2023-01-01 PROCEDURE — A9585 GADOBUTROL INJECTION: HCPCS | Mod: PO | Performed by: INTERNAL MEDICINE

## 2023-01-01 PROCEDURE — 99213 OFFICE O/P EST LOW 20 MIN: CPT | Mod: 95,,, | Performed by: STUDENT IN AN ORGANIZED HEALTH CARE EDUCATION/TRAINING PROGRAM

## 2023-01-01 PROCEDURE — 74177 CT ABD & PELVIS W/CONTRAST: CPT | Mod: TC,PO

## 2023-01-01 PROCEDURE — 99999 PR PBB SHADOW E&M-EST. PATIENT-LVL IV: CPT | Mod: PBBFAC,,,

## 2023-01-01 PROCEDURE — 99215 PR OFFICE/OUTPT VISIT, EST, LEVL V, 40-54 MIN: ICD-10-PCS | Mod: S$PBB,,,

## 2023-01-01 PROCEDURE — 99999 PR PBB SHADOW E&M-EST. PATIENT-LVL V: ICD-10-PCS | Mod: PBBFAC,,, | Performed by: NURSE PRACTITIONER

## 2023-01-01 PROCEDURE — 73110 X-RAY EXAM OF WRIST: CPT | Mod: 26,RT,, | Performed by: RADIOLOGY

## 2023-01-01 PROCEDURE — 99215 PR OFFICE/OUTPT VISIT, EST, LEVL V, 40-54 MIN: ICD-10-PCS | Mod: S$PBB,,, | Performed by: INTERNAL MEDICINE

## 2023-01-01 PROCEDURE — 99215 OFFICE O/P EST HI 40 MIN: CPT | Mod: PBBFAC,PN,25 | Performed by: INTERNAL MEDICINE

## 2023-01-01 PROCEDURE — 99215 OFFICE O/P EST HI 40 MIN: CPT | Mod: PBBFAC,25,PN | Performed by: NURSE PRACTITIONER

## 2023-01-01 PROCEDURE — 25000003 PHARM REV CODE 250: Mod: PN

## 2023-01-01 PROCEDURE — 99215 OFFICE O/P EST HI 40 MIN: CPT | Mod: S$PBB,,,

## 2023-01-01 PROCEDURE — 96417 CHEMO IV INFUS EACH ADDL SEQ: CPT | Mod: PN

## 2023-01-01 PROCEDURE — 99999 PR PBB SHADOW E&M-EST. PATIENT-LVL IV: ICD-10-PCS | Mod: PBBFAC,,, | Performed by: NURSE PRACTITIONER

## 2023-01-01 PROCEDURE — 99443 PR PHYSICIAN TELEPHONE EVALUATION 21-30 MIN: ICD-10-PCS | Mod: 95,,, | Performed by: EMERGENCY MEDICINE

## 2023-01-01 PROCEDURE — 99999 PR PBB SHADOW E&M-EST. PATIENT-LVL III: ICD-10-PCS | Mod: PBBFAC,,, | Performed by: INTERNAL MEDICINE

## 2023-01-01 PROCEDURE — 99215 OFFICE O/P EST HI 40 MIN: CPT | Mod: S$PBB,,, | Performed by: NURSE PRACTITIONER

## 2023-01-01 PROCEDURE — 74177 CT ABD & PELVIS W/CONTRAST: CPT | Mod: 26,,, | Performed by: RADIOLOGY

## 2023-01-01 PROCEDURE — 96374 THER/PROPH/DIAG INJ IV PUSH: CPT | Mod: PN

## 2023-01-01 PROCEDURE — 99215 PR OFFICE/OUTPT VISIT, EST, LEVL V, 40-54 MIN: ICD-10-PCS | Mod: S$PBB,,, | Performed by: NURSE PRACTITIONER

## 2023-01-01 PROCEDURE — 73110 X-RAY EXAM OF WRIST: CPT | Mod: TC,FY,PO,RT

## 2023-01-01 PROCEDURE — 71260 CT CHEST ABDOMEN PELVIS WITH CONTRAST (XPD): ICD-10-PCS | Mod: 26,,, | Performed by: RADIOLOGY

## 2023-01-01 PROCEDURE — 99214 PR OFFICE/OUTPT VISIT, EST, LEVL IV, 30-39 MIN: ICD-10-PCS | Mod: S$PBB,,, | Performed by: INTERNAL MEDICINE

## 2023-01-01 PROCEDURE — 85027 COMPLETE CBC AUTOMATED: CPT | Mod: PN | Performed by: INTERNAL MEDICINE

## 2023-01-01 PROCEDURE — 99999 PR PBB SHADOW E&M-EST. PATIENT-LVL III: CPT | Mod: PBBFAC,,, | Performed by: INTERNAL MEDICINE

## 2023-01-01 PROCEDURE — 82947 ASSAY GLUCOSE BLOOD QUANT: CPT | Mod: 91,PN

## 2023-01-01 PROCEDURE — 99443 PR PHYSICIAN TELEPHONE EVALUATION 21-30 MIN: CPT | Mod: 95,,, | Performed by: EMERGENCY MEDICINE

## 2023-01-01 PROCEDURE — 99999 PR PBB SHADOW E&M-EST. PATIENT-LVL V: CPT | Mod: PBBFAC,,, | Performed by: NURSE PRACTITIONER

## 2023-01-01 PROCEDURE — 96415 CHEMO IV INFUSION ADDL HR: CPT | Mod: PN

## 2023-01-01 PROCEDURE — 70553 MRI BRAIN STEM W/O & W/DYE: CPT | Mod: 26,,, | Performed by: RADIOLOGY

## 2023-01-01 PROCEDURE — 99214 OFFICE O/P EST MOD 30 MIN: CPT | Mod: PBBFAC,PN | Performed by: NURSE PRACTITIONER

## 2023-01-01 PROCEDURE — 99213 PR OFFICE/OUTPT VISIT, EST, LEVL III, 20-29 MIN: ICD-10-PCS | Mod: 95,,, | Performed by: STUDENT IN AN ORGANIZED HEALTH CARE EDUCATION/TRAINING PROGRAM

## 2023-01-01 PROCEDURE — 99497 PR ADVNCD CARE PLAN 30 MIN: ICD-10-PCS | Mod: S$PBB,,, | Performed by: NURSE PRACTITIONER

## 2023-01-01 PROCEDURE — 99497 ADVNCD CARE PLAN 30 MIN: CPT | Mod: PBBFAC,27,PN | Performed by: NURSE PRACTITIONER

## 2023-01-01 PROCEDURE — 99497 ADVNCD CARE PLAN 30 MIN: CPT | Mod: PBBFAC,PN | Performed by: NURSE PRACTITIONER

## 2023-01-01 PROCEDURE — 70553 MRI BRAIN W WO CONTRAST: ICD-10-PCS | Mod: 26,,, | Performed by: RADIOLOGY

## 2023-01-01 PROCEDURE — 80053 COMPREHEN METABOLIC PANEL: CPT | Mod: PN | Performed by: INTERNAL MEDICINE

## 2023-01-01 PROCEDURE — 73110 XR WRIST COMPLETE 3 VIEWS RIGHT: ICD-10-PCS | Mod: 26,RT,, | Performed by: RADIOLOGY

## 2023-01-01 PROCEDURE — 99999 PR PBB SHADOW E&M-EST. PATIENT-LVL IV: CPT | Mod: PBBFAC,,, | Performed by: NURSE PRACTITIONER

## 2023-01-01 PROCEDURE — 96375 TX/PRO/DX INJ NEW DRUG ADDON: CPT | Mod: PN

## 2023-01-01 PROCEDURE — 99214 OFFICE O/P EST MOD 30 MIN: CPT | Mod: S$PBB,,, | Performed by: INTERNAL MEDICINE

## 2023-01-01 PROCEDURE — 99999 PR PBB SHADOW E&M-EST. PATIENT-LVL V: CPT | Mod: PBBFAC,,, | Performed by: INTERNAL MEDICINE

## 2023-01-01 PROCEDURE — 63600175 PHARM REV CODE 636 W HCPCS: Mod: PN

## 2023-01-01 PROCEDURE — 96367 TX/PROPH/DG ADDL SEQ IV INF: CPT | Mod: PN

## 2023-01-01 PROCEDURE — 99214 OFFICE O/P EST MOD 30 MIN: CPT | Mod: S$PBB,,,

## 2023-01-01 RX ORDER — HEPARIN 100 UNIT/ML
500 SYRINGE INTRAVENOUS
Status: CANCELLED | OUTPATIENT
Start: 2023-01-01

## 2023-01-01 RX ORDER — DEXAMETHASONE 6 MG/1
TABLET ORAL
Qty: 40 TABLET | Refills: 0 | Status: ON HOLD | OUTPATIENT
Start: 2023-01-01 | End: 2023-01-01 | Stop reason: HOSPADM

## 2023-01-01 RX ORDER — GABAPENTIN 800 MG/1
800 TABLET ORAL 3 TIMES DAILY
Qty: 51 TABLET | Refills: 0 | Status: CANCELLED | OUTPATIENT
Start: 2023-01-01 | End: 2023-01-01

## 2023-01-01 RX ORDER — DIPHENHYDRAMINE HYDROCHLORIDE 50 MG/ML
50 INJECTION INTRAMUSCULAR; INTRAVENOUS ONCE AS NEEDED
Status: CANCELLED | OUTPATIENT
Start: 2023-01-01

## 2023-01-01 RX ORDER — EPINEPHRINE 0.3 MG/.3ML
0.3 INJECTION SUBCUTANEOUS ONCE AS NEEDED
Status: DISCONTINUED | OUTPATIENT
Start: 2023-01-01 | End: 2023-01-01 | Stop reason: HOSPADM

## 2023-01-01 RX ORDER — METHADONE HYDROCHLORIDE 10 MG/1
15 TABLET ORAL 3 TIMES DAILY
Qty: 135 TABLET | Refills: 0 | Status: SHIPPED | OUTPATIENT
Start: 2023-01-01 | End: 2023-01-01 | Stop reason: SDUPTHER

## 2023-01-01 RX ORDER — SENNOSIDES 8.6 MG/1
1 TABLET ORAL NIGHTLY
Qty: 90 TABLET | Refills: 3 | Status: SHIPPED | OUTPATIENT
Start: 2023-01-01 | End: 2023-01-01 | Stop reason: SDUPTHER

## 2023-01-01 RX ORDER — ONDANSETRON 2 MG/ML
4 INJECTION INTRAMUSCULAR; INTRAVENOUS ONCE AS NEEDED
Status: CANCELLED
Start: 2023-01-01

## 2023-01-01 RX ORDER — EPINEPHRINE 0.3 MG/.3ML
0.3 INJECTION SUBCUTANEOUS ONCE AS NEEDED
Status: CANCELLED | OUTPATIENT
Start: 2023-01-01

## 2023-01-01 RX ORDER — GADOBUTROL 604.72 MG/ML
5 INJECTION INTRAVENOUS
Status: COMPLETED | OUTPATIENT
Start: 2023-01-01 | End: 2023-01-01

## 2023-01-01 RX ORDER — PROMETHAZINE HYDROCHLORIDE 12.5 MG/1
12.5 TABLET ORAL EVERY 6 HOURS PRN
Qty: 30 TABLET | Refills: 0 | Status: SHIPPED | OUTPATIENT
Start: 2023-01-01 | End: 2023-01-01 | Stop reason: CLARIF

## 2023-01-01 RX ORDER — METHADONE HYDROCHLORIDE 10 MG/1
15 TABLET ORAL EVERY 8 HOURS PRN
Qty: 135 TABLET | Refills: 0 | Status: SHIPPED | OUTPATIENT
Start: 2023-01-01 | End: 2023-01-01

## 2023-01-01 RX ORDER — METHADONE HYDROCHLORIDE 10 MG/1
15 TABLET ORAL EVERY 8 HOURS PRN
Qty: 135 TABLET | Refills: 0 | Status: SHIPPED | OUTPATIENT
Start: 2023-01-01 | End: 2023-01-01 | Stop reason: SDUPTHER

## 2023-01-01 RX ORDER — METHADONE HYDROCHLORIDE 10 MG/1
15 TABLET ORAL EVERY 8 HOURS PRN
Qty: 135 TABLET | Refills: 0 | Status: CANCELLED | OUTPATIENT
Start: 2023-01-01 | End: 2023-01-01

## 2023-01-01 RX ORDER — SODIUM CHLORIDE 0.9 % (FLUSH) 0.9 %
10 SYRINGE (ML) INJECTION
Status: DISCONTINUED | OUTPATIENT
Start: 2023-01-01 | End: 2023-01-01 | Stop reason: HOSPADM

## 2023-01-01 RX ORDER — SODIUM CHLORIDE 0.9 % (FLUSH) 0.9 %
10 SYRINGE (ML) INJECTION
Status: CANCELLED | OUTPATIENT
Start: 2023-01-01

## 2023-01-01 RX ORDER — HEPARIN 100 UNIT/ML
500 SYRINGE INTRAVENOUS
Status: DISCONTINUED | OUTPATIENT
Start: 2023-01-01 | End: 2023-01-01 | Stop reason: HOSPADM

## 2023-01-01 RX ORDER — FLUTICASONE FUROATE AND VILANTEROL TRIFENATATE 100; 25 UG/1; UG/1
1 POWDER RESPIRATORY (INHALATION)
COMMUNITY
Start: 2023-01-01 | End: 2023-01-01 | Stop reason: CLARIF

## 2023-01-01 RX ORDER — DIPHENHYDRAMINE HYDROCHLORIDE 50 MG/ML
50 INJECTION INTRAMUSCULAR; INTRAVENOUS ONCE AS NEEDED
Status: DISCONTINUED | OUTPATIENT
Start: 2023-01-01 | End: 2023-01-01 | Stop reason: HOSPADM

## 2023-01-01 RX ORDER — TRAZODONE HYDROCHLORIDE 50 MG/1
50 TABLET ORAL NIGHTLY
Qty: 30 TABLET | Refills: 0 | Status: SHIPPED | OUTPATIENT
Start: 2023-01-01 | End: 2023-01-01

## 2023-01-01 RX ORDER — NYSTATIN 100000 [USP'U]/ML
6 SUSPENSION ORAL 4 TIMES DAILY
Qty: 240 ML | Refills: 0 | Status: SHIPPED | OUTPATIENT
Start: 2023-01-01 | End: 2023-01-01

## 2023-01-01 RX ORDER — OXYCODONE HYDROCHLORIDE 30 MG/1
30 TABLET ORAL EVERY 4 HOURS PRN
Qty: 180 TABLET | Refills: 0 | Status: SHIPPED | OUTPATIENT
Start: 2023-01-01 | End: 2023-01-01 | Stop reason: SDUPTHER

## 2023-01-01 RX ORDER — SENNOSIDES 8.6 MG/1
2 TABLET ORAL 2 TIMES DAILY
Qty: 120 TABLET | Refills: 0 | Status: SHIPPED | OUTPATIENT
Start: 2023-01-01 | End: 2023-01-01

## 2023-01-01 RX ORDER — METFORMIN HYDROCHLORIDE 1000 MG/1
1000 TABLET ORAL 2 TIMES DAILY
COMMUNITY
Start: 2023-01-01 | End: 2023-01-01 | Stop reason: CLARIF

## 2023-01-01 RX ORDER — GABAPENTIN 800 MG/1
800 TABLET ORAL 3 TIMES DAILY
Qty: 51 TABLET | Refills: 0 | Status: SHIPPED | OUTPATIENT
Start: 2023-01-01 | End: 2023-01-01

## 2023-01-01 RX ORDER — SODIUM CHLORIDE 9 MG/ML
1000 INJECTION, SOLUTION INTRAVENOUS
Status: COMPLETED | OUTPATIENT
Start: 2023-01-01 | End: 2023-01-01

## 2023-01-01 RX ORDER — FAMOTIDINE 10 MG/ML
20 INJECTION INTRAVENOUS
Status: CANCELLED | OUTPATIENT
Start: 2023-01-01

## 2023-01-01 RX ORDER — GABAPENTIN 800 MG/1
800 TABLET ORAL 3 TIMES DAILY
Qty: 51 TABLET | Refills: 0 | Status: SHIPPED | OUTPATIENT
Start: 2023-01-01 | End: 2023-01-01 | Stop reason: SDUPTHER

## 2023-01-01 RX ORDER — POLYETHYLENE GLYCOL 3350 17 G/17G
17 POWDER, FOR SOLUTION ORAL DAILY
Qty: 100 EACH | Refills: 0 | Status: SHIPPED | OUTPATIENT
Start: 2023-01-01 | End: 2023-01-01 | Stop reason: CLARIF

## 2023-01-01 RX ORDER — ONDANSETRON 2 MG/ML
4 INJECTION INTRAMUSCULAR; INTRAVENOUS ONCE AS NEEDED
Status: COMPLETED | OUTPATIENT
Start: 2023-01-01 | End: 2023-01-01

## 2023-01-01 RX ORDER — SENNOSIDES 8.6 MG/1
2 TABLET ORAL 2 TIMES DAILY
Qty: 90 TABLET | Refills: 3 | Status: SHIPPED | OUTPATIENT
Start: 2023-01-01 | End: 2023-01-01 | Stop reason: CLARIF

## 2023-01-01 RX ORDER — FAMOTIDINE 10 MG/ML
20 INJECTION INTRAVENOUS
Status: COMPLETED | OUTPATIENT
Start: 2023-01-01 | End: 2023-01-01

## 2023-01-01 RX ADMIN — IOHEXOL 75 ML: 350 INJECTION, SOLUTION INTRAVENOUS at 01:01

## 2023-01-01 RX ADMIN — BARIUM SULFATE 900 ML: 20 SUSPENSION ORAL at 01:05

## 2023-01-01 RX ADMIN — Medication 500 UNITS: at 03:02

## 2023-01-01 RX ADMIN — IOHEXOL 75 ML: 350 INJECTION, SOLUTION INTRAVENOUS at 12:05

## 2023-01-01 RX ADMIN — PALONOSETRON 0.25 MG: 0.05 INJECTION, SOLUTION INTRAVENOUS at 09:03

## 2023-01-01 RX ADMIN — SODIUM CHLORIDE: 9 INJECTION, SOLUTION INTRAVENOUS at 01:01

## 2023-01-01 RX ADMIN — ONDANSETRON 4 MG: 2 INJECTION INTRAMUSCULAR; INTRAVENOUS at 01:03

## 2023-01-01 RX ADMIN — PACLITAXEL 306 MG: 6 INJECTION, SOLUTION, CONCENTRATE INTRAVENOUS at 10:03

## 2023-01-01 RX ADMIN — GADOBUTROL 5 ML: 604.72 INJECTION INTRAVENOUS at 02:01

## 2023-01-01 RX ADMIN — DIPHENHYDRAMINE HYDROCHLORIDE 50 MG: 50 INJECTION INTRAMUSCULAR; INTRAVENOUS at 10:03

## 2023-01-01 RX ADMIN — SODIUM CHLORIDE: 9 INJECTION, SOLUTION INTRAVENOUS at 09:03

## 2023-01-01 RX ADMIN — SODIUM CHLORIDE 1000 ML: 9 INJECTION, SOLUTION INTRAVENOUS at 01:03

## 2023-01-01 RX ADMIN — SODIUM CHLORIDE: 9 INJECTION, SOLUTION INTRAVENOUS at 01:02

## 2023-01-01 RX ADMIN — CARBOPLATIN 360 MG: 10 INJECTION, SOLUTION INTRAVENOUS at 01:03

## 2023-01-01 RX ADMIN — Medication 500 UNITS: at 04:03

## 2023-01-01 RX ADMIN — APREPITANT 130 MG: 130 INJECTION, EMULSION INTRAVENOUS at 09:03

## 2023-01-01 RX ADMIN — FAMOTIDINE 20 MG: 10 INJECTION INTRAVENOUS at 09:03

## 2023-01-01 RX ADMIN — SODIUM CHLORIDE 1500 MG: 9 INJECTION, SOLUTION INTRAVENOUS at 02:01

## 2023-01-01 RX ADMIN — SODIUM CHLORIDE 1500 MG: 9 INJECTION, SOLUTION INTRAVENOUS at 02:02

## 2023-01-01 RX ADMIN — BARIUM SULFATE 900 ML: 20 SUSPENSION ORAL at 02:01

## 2023-01-01 RX ADMIN — Medication 500 UNITS: at 02:03

## 2023-01-11 NOTE — TELEPHONE ENCOUNTER
Patient called stating she is having more pain and wanting increase in pain medication. States she is taking methadone 3x/day and oxycodone 3x/day. Informed her that she can take oxycodone every 4 hrs as needed(that is the current order).She verbalizes understanding. Reminded her to call for refill when needed.

## 2023-01-12 NOTE — PROGRESS NOTES
Oncology Nutrition   Teresa Earl   1950    [employer]     Pt eligible for [employer] . Order filled out with patient at chairside. All patient questions or concerns regarding  and/or nutrition status addressed. RD to continue to monitor prn and provide patient food while under active treatment.     Food order filled by this RD- will provide to patient at end of infusion today.    Millie Car, JANISN, LDN  01/12/2023  3:48 PM

## 2023-01-12 NOTE — PROGRESS NOTES
"Subjective:       Patient ID: Teresa Earl is a 72 y.o. female.    Chief Complaint: Cancer of upper lobe of left lung  Mrs. Earl is a 72 year old female who underwent a chest xray in September 2022 prior to a discectomy and that showed a lung mass.      She then underwent CT chest also on September 2022 which showed spiculated mass in anterior segment GRACIELA, multiple borderline/enlarged mediastinal lymph nodes.     PFT's on 11/5/21 revealed FVC 1.96 (61%), FEV1 1.47 (60%), TLC 73%, DLCO 9.18 (47%)     PET/CT on 11/11/2022 reveals  FDG avid, spiculated pleural based mass in the GRACIELA extending into the anterior mediastinum, measuring 3.5cm. SUV 8.5. no enlarged or FDG avid lymphadenopathy in chest. No distant mets but FDG avid L parotid lesion     Navigational bronch on 12/13/2021 revealed "no endobronchial disease medial GRACIELA sampled. EBUS for station 4L, 7, 11. Path: GRACIELA suspicious but not diagnostic for malignancy. 4L, 7, 11L negative for malignancy"     On 1/25/22: he met with CT surgery and is felt not to be a candidate for resection due to invasion of mediastinum.     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 Scattered solid pulmonary nodules measuring up to 3 mm in size for which short-term imaging follow-up is recommended. Centrilobular emphysematous lung architecture.  21 x 22 mm hypodense area within the medial segment of the left hepatic lobe abutting the interlobar " "fissure.  While it is possible that this represents focal fatty infiltration, an underlying solitary liver lesion or focus of metastatic disease cannot be excluded.  The patient's recent PET-CT scan showed no abnormal FDG hypermetabolism in this vicinity.  Consider additional investigation with contrast enhanced MRI of the liver. Stable 26 x 39 mm left adnexal hypodensity which show no FDG hypermetabolism at the time of the patient's recent PET-CT scan. Tiny fat containing umbilical hernia. 12 mm short axis right paratracheal lymph node which showed no FDG hypermetabolism at the time of the patient's recent PET-CT scan and which remains unchanged in size since that study"  Follow-up MRI abdomen on 3/3/2022 reveals "Hypoattenuating area on prior CT near the falciform ligament corresponds to an area of focal fatty infiltration.  No suspicious liver lesions are present"          She completed definitive chemo/RT with weekly Carboplatin and Taxo (4/14/2022) and completed Radiation on 4/26/2022  CT scans from 5/10/2022 "Interval decrease in the size of the left upper lobe tumor and precarinal mediastinal lymph node. Interval decrease in the extent of the consolidation at the base of the left lower lobe. Chronic findings of pulmonary emphysema and bilateral renal cysts"  She started consolidation Imfinzi on 5/12/2022             Bola comes in for cycle 7 of  Imfinzi  She notes that she tripped over her Oxygen cord and fell and landed on her right wrist and hence the right wrist is swollen and painful.She wears Oxygen at night  She was sick over [place], with cough. Did not check for COVID.    She denies any nausea, vomiting, diarrhea, constipation, abdominal pain, weight loss or loss of appetite, chest pain, shortness of breath, leg swelling, fatigue, pain, headache, dizziness, or mood changes. Her ECOG PS is zero. She is accompanied by her daughter Li Whitehead     Review of Systems   Constitutional:  Negative for " appetite change, fatigue and unexpected weight change.   HENT:  Negative for mouth sores.    Eyes:  Negative for visual disturbance.   Respiratory:  Negative for cough and shortness of breath.    Cardiovascular:  Negative for chest pain.   Gastrointestinal:  Negative for abdominal pain and diarrhea.   Genitourinary:  Negative for frequency.   Musculoskeletal:  Negative for back pain.   Integumentary:  Negative for rash.   Neurological:  Negative for headaches.   Hematological:  Negative for adenopathy.   Psychiatric/Behavioral:  The patient is not nervous/anxious.    All other systems reviewed and are negative.      Objective:      Physical Exam  Vitals reviewed.   Constitutional:       Appearance: She is well-developed.   HENT:      Mouth/Throat:      Pharynx: No oropharyngeal exudate.   Cardiovascular:      Rate and Rhythm: Normal rate.      Heart sounds: Normal heart sounds.   Pulmonary:      Effort: Pulmonary effort is normal.      Breath sounds: Normal breath sounds. No wheezing.   Abdominal:      General: Bowel sounds are normal.      Palpations: Abdomen is soft.      Tenderness: There is no abdominal tenderness.   Musculoskeletal:         General: No tenderness.      Comments: Right wrist swollen+   Lymphadenopathy:      Cervical: No cervical adenopathy.   Skin:     General: Skin is warm and dry.      Findings: No rash.   Neurological:      Mental Status: She is alert and oriented to person, place, and time.      Coordination: Coordination normal.   Psychiatric:         Thought Content: Thought content normal.         Judgment: Judgment normal.         LABS:  WBC   Date Value Ref Range Status   01/12/2023 11.00 3.90 - 12.70 K/uL Final     Hemoglobin   Date Value Ref Range Status   01/12/2023 12.2 12.0 - 16.0 g/dL Final     Hematocrit   Date Value Ref Range Status   01/12/2023 39.6 37.0 - 48.5 % Final     Platelets   Date Value Ref Range Status   01/12/2023 199 150 - 450 K/uL Final     Gran # (ANC)   Date  Value Ref Range Status   01/12/2023 8.6 (H) 1.8 - 7.7 K/uL Final     Comment:     The ANC is based on a white cell differential from an   automated cell counter. It has not been microscopically   reviewed for the presence of abnormal cells. Clinical   correlation is required.         Chemistry        Component Value Date/Time     01/12/2023 1042    K 4.6 01/12/2023 1042     01/12/2023 1042    CO2 26 01/12/2023 1042    BUN 13 01/12/2023 1042    CREATININE 1.1 01/12/2023 1042    GLU 99 01/12/2023 1042        Component Value Date/Time    CALCIUM 9.2 01/12/2023 1042    ALKPHOS 90 01/12/2023 1042    AST 11 01/12/2023 1042    ALT 9 (L) 01/12/2023 1042    BILITOT 0.2 01/12/2023 1042    ESTGFRAFRICA >60 07/27/2022 0858    EGFRNONAA >60 07/27/2022 0858          Assessment:       Problem List Items Addressed This Visit       Malignant neoplasm of upper lobe of left lung - Primary    Relevant Orders    CBC w/ DIFF    CMP    CT Chest Abdomen Pelvis With Contrast (xpd)    MRI Brain W WO Contrast    Secondary malignancy of anterior mediastinum    Type 2 diabetes mellitus without complication     Other Visit Diagnoses       Hypothyroidism, unspecified type        Relevant Orders    TSH    FREE T4            Plan:         Route Chart for Scheduling    Med Onc Chart Routing      Follow up with physician . Wrist xray ASAP. In 4 weeks Schedule CBC,CMp, TSH, free T4, CT chest, abdomen/pelvis and MRI brain and see me and for Imfinzi   Follow up with CLARISSE    Infusion scheduling note    Injection scheduling note    Labs    Imaging    Pharmacy appointment    Other referrals          Treatment Plan Information   OP DURVALUMAB 1500 MG Q4W   Mere Styles MD   Upcoming Treatment Dates - OP DURVALUMAB 1500 MG Q4W    1/12/2023       Chemotherapy       durvalumab (IMFINZI) 1,500 mg in sodium chloride 0.9% SolP 280 mL chemo infusion  2/9/2023       Chemotherapy       durvalumab (IMFINZI) 1,500 mg in sodium chloride 0.9% SolP 280 mL  chemo infusion  3/9/2023       Chemotherapy       durvalumab (IMFINZI) 1,500 mg in sodium chloride 0.9% SolP 280 mL chemo infusion  4/6/2023       Chemotherapy       durvalumab (IMFINZI) 1,500 mg in sodium chloride 0.9% SolP 280 mL chemo infusion       She will proceed with consolidation immunotherapy with Imfinzi and will return in 4 weeks for next treatment with restaging CT scans and mRI brain  Right wrist pain s/p fall: will obtain xray and refer to pcp or ortho     Above care plan was discussed with patient and accompanying daughter and all questions were addressed to their satisfaction

## 2023-01-13 NOTE — PROGRESS NOTES
Late entry for 01/12/23    SW met briefly with pt and provided her with a gas card. She also received food from [facility]. Pt denied any further needs.     Rhonda Bill, JENNYW

## 2023-01-31 NOTE — PROGRESS NOTES
"[facility]      THORACIC MULTIDISCIPLINARY TUMOR BOARD  PATIENT REVIEW FORM  ___________________________________________________________________    CLINIC #: [id number]  DATE: 01/31/2023    TUMOR SITE:   Cancer Type: Lung cancer     Cancer Site: upper lobe     Tumor Laterality: Left     Cancer Staging Complete: Yes       Presenting Hospital / Clinic: [facility]     Virtual Tumor Board Conference: In person       PRESENTER:   Presenter: Dr. Styles     Specialties Present: Medical Oncology; Radiation Oncology; Surgical Oncology; Pathology; Navigation; Radiology; Pulmonology       PATIENT SUMMARY:   72 year old female who underwent a chest xray in September 2022 prior to a discectomy and that showed a lung mass.   She then underwent CT chest also on September 2022 which showed spiculated mass in anterior segment GRACIELA, multiple borderline/enlarged mediastinal lymph nodes.     PFT's on 11/5/21 revealed FVC 1.96 (61%), FEV1 1.47 (60%), TLC 73%, DLCO 9.18 (47%)     PET/CT on 11/11/2022 reveals  FDG avid, spiculated pleural based mass in the GRACIELA extending into the anterior mediastinum, measuring 3.5cm. SUV 8.5. no enlarged or FDG avid lymphadenopathy in chest. No distant mets but FDG avid L parotid lesion     Navigational bronch on 12/13/2021 revealed "no endobronchial disease medial GRACIELA sampled. EBUS for station 4L, 7, 11. Path: GRACIELA suspicious but not diagnostic for malignancy. 4L, 7, 11L negative for malignancy"     On 1/25/22: he met with CT surgery and is felt not to be a candidate for resection due to invasion of mediastinum.     MRI brain on 2/12/2022 revealed  no intracranial mets     CT Guided biopsy on 2/24/2022 reveals  of GRACIELA demonstrating squamous cell carcinoma. Left parotid gland demonstrating pleomorphic adenomal     Repeat CT scans on 2/28/2022 revealed "56 x 39 x 15 mm spiculated pleural-based left upper lobe " "mass (compatible with biopsy proven diagnosis of squamous cell carcinoma) which abuts and invades the anterior mediastinum with compression and/or invasion of the brachial cephalic vein and intimate association with the left internal mammary vessels and anterior margin of the thoracic aortic arch. Scattered solid pulmonary nodules measuring up to 3 mm in size for which short-term imaging follow-up is recommended. Centrilobular emphysematous lung architecture.  21 x 22 mm hypodense area within the medial segment of the left hepatic lobe abutting the interlobar fissure.  While it is possible that this represents focal fatty infiltration, an underlying solitary liver lesion or focus of metastatic disease cannot be excluded.  The patient's recent PET-CT scan showed no abnormal FDG hypermetabolism in this vicinity.  Consider additional investigation with contrast enhanced MRI of the liver. Stable 26 x 39 mm left adnexal hypodensity which show no FDG hypermetabolism at the time of the patient's recent PET-CT scan. Tiny fat containing umbilical hernia. 12 mm short axis right paratracheal lymph node which showed no FDG hypermetabolism at the time of the patient's recent PET-CT scan and which remains unchanged in size since that study"  Follow-up MRI abdomen on 3/3/2022 reveals "Hypoattenuating area on prior CT near the falciform ligament corresponds to an area of focal fatty infiltration.  No suspicious liver lesions are present"     She completed definitive chemo/RT with weekly Carboplatin and Taxo (4/14/2022) and completed Radiation on 4/26/2022  CT scans from 5/10/2022 "Interval decrease in the size of the left upper lobe tumor and precarinal mediastinal lymph node. Interval decrease in the extent of the consolidation at the base of the left lower lobe. Chronic findings of pulmonary emphysema and bilateral renal cysts"  She started consolidation Imfinzi on 5/12/2022    CT C/A/P 1/27/2023 --   Impression:  1. Interval " enlargement of the anterior mediastinal mass in size with development of an air-filled cystic cavitary component.  This most likely relates to fistulization with the adjacent lung.  Air forming organism an abscess would seem less likely but difficult to exclude.  No obvious fluid collection is noted  2. Several small pulmonary nodules/regions of consolidation near the lung apices bilaterally favored to be new since the prior with a reticulonodular appearance.  Infectious and inflammatory as well as neoplastic etiologies is on differential.  Close follow-up for stability or resolution is necessary.    Background Information  Patient Status: a current patient  Reason for Consultation: Follow-Up from Prior Tumor Board  Biopsy Date: 02/14/22  Biopsy Results: Cancer (squamous cell carcinoma)  Treatment to Date: Neoadjuvant Chemoradiation; Maintenance Chemotherapy         BOARD RECOMMENDATIONS:   Recommended Plan (General)  Recommended Plan: Biopsy  Recommended Plan Note: IR biopsy       Cancer Staging   Cancer of upper lobe of left lung  Staging form: Lung, AJCC 8th Edition  - Clinical stage from 2/17/2022: Stage IIIA (cT4, cN0, cM0) - Signed by Ziggy Sanders Jr., MD on 2/17/2022       GROUP STAGE:       [] O    [] 1A    [] IB    [] IIA    [] IIB     [x] IIIA     [] IIIB     [] IIIC [] IV     [] Local recurrence     [] Regional recurrence     [] Distant recurrence   [x] NSCLC     [] SCLC     Tumor type         [x] Maya'l Treatment Guidelines reviewed and care planned is consistent with guidelines.         (i.e., NCCN, NCI, PD, ACO, AUA, etc.)    PRESENTATION AT CANCER CONFERENCE:   Presentation at Cancer Conference: Prospective       CLINICAL TRIAL ELIGIBILITY:   Clinical Trial Eligibility  Clinical Trial Eligibility: Not discussed         Cara ANDREY Rolon

## 2023-02-03 NOTE — TELEPHONE ENCOUNTER
Spoke to her about Thoracic conference recs., there is some enlargement of the anterior mediastinal mass.   Plan is for IR biopsy

## 2023-02-09 NOTE — PROGRESS NOTES
"Subjective:       Patient ID: Teresa Earl is a 72 y.o. female.    Chief Complaint: Lung Cancer  Mrs. Earl is a 72 year old female who underwent a chest xray in September 2022 prior to a discectomy and that showed a lung mass.      She then underwent CT chest also on September 2022 which showed spiculated mass in anterior segment GRACIELA, multiple borderline/enlarged mediastinal lymph nodes.     PFT's on 11/5/21 revealed FVC 1.96 (61%), FEV1 1.47 (60%), TLC 73%, DLCO 9.18 (47%)     PET/CT on 11/11/2022 reveals  FDG avid, spiculated pleural based mass in the GRACIELA extending into the anterior mediastinum, measuring 3.5cm. SUV 8.5. no enlarged or FDG avid lymphadenopathy in chest. No distant mets but FDG avid L parotid lesion     Navigational bronch on 12/13/2021 revealed "no endobronchial disease medial GRACIELA sampled. EBUS for station 4L, 7, 11. Path: GRACIELA suspicious but not diagnostic for malignancy. 4L, 7, 11L negative for malignancy"     On 1/25/22: he met with CT surgery and is felt not to be a candidate for resection due to invasion of mediastinum.     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 Scattered solid pulmonary nodules measuring up to 3 mm in size for which short-term imaging follow-up is recommended. Centrilobular emphysematous lung architecture.  21 x 22 mm hypodense area within the medial segment of the left hepatic lobe abutting the interlobar fissure.  While it is " "possible that this represents focal fatty infiltration, an underlying solitary liver lesion or focus of metastatic disease cannot be excluded.  The patient's recent PET-CT scan showed no abnormal FDG hypermetabolism in this vicinity.  Consider additional investigation with contrast enhanced MRI of the liver. Stable 26 x 39 mm left adnexal hypodensity which show no FDG hypermetabolism at the time of the patient's recent PET-CT scan. Tiny fat containing umbilical hernia. 12 mm short axis right paratracheal lymph node which showed no FDG hypermetabolism at the time of the patient's recent PET-CT scan and which remains unchanged in size since that study"  Follow-up MRI abdomen on 3/3/2022 reveals "Hypoattenuating area on prior CT near the falciform ligament corresponds to an area of focal fatty infiltration.  No suspicious liver lesions are present"          She completed definitive chemo/RT with weekly Carboplatin and Taxo (4/14/2022) and completed Radiation on 4/26/2022  CT scans from 5/10/2022 "Interval decrease in the size of the left upper lobe tumor and precarinal mediastinal lymph node. Interval decrease in the extent of the consolidation at the base of the left lower lobe. Chronic findings of pulmonary emphysema and bilateral renal cysts"  She started consolidation Imfinzi on 5/12/2022                HPIShe comes in for cycle 7 of  Imfinzi  CT scans on 1/27/2023 reveal  "Interval enlargement of the anterior mediastinal mass in size with development of an air-filled cystic cavitary component.  This most likely relates to fistulization with the adjacent lung.  Air forming organism an abscess would seem less likely but difficult to exclude.  No obvious fluid collection is noted. Several small pulmonary nodules/regions of consolidation near the lung apices bilaterally favored to be new since the prior with a reticulonodular appearance.  Infectious and inflammatory as well as neoplastic etiologies is on differential. " " Close follow-up for stability or resolution is necessary. Renal hypodensities without worrisome change since the prior statistically favored relate to cysts. Left ovarian hypodensity is stable since the prior of uncertain etiology.  Correlation with female pelvis ultrasound in female pelvis ultrasound follow-up for size stability would be reasonable"      MRI brain reveals "No definite evidence of intracranial metastatic disease, noting prominent motion degradation on postcontrast images, which may limit evaluation for small metastases.  Consider repeat imaging, as clinically warranted. No acute abnormality"    She notes upper chest pain X 1-2 months. Notes shortness of breath which is at baseline  She sees palliative care and is on Methadone and Oxycodone      Review of Systems   Constitutional:  Negative for appetite change, fatigue and unexpected weight change.   HENT:  Negative for mouth sores.    Eyes:  Negative for visual disturbance.   Respiratory:  Positive for cough and shortness of breath.    Cardiovascular:  Positive for chest pain.   Gastrointestinal:  Negative for abdominal pain, diarrhea and nausea.   Genitourinary:  Negative for frequency.   Musculoskeletal:  Negative for arthralgias and back pain.   Integumentary:  Negative for rash.   Neurological:  Positive for memory loss. Negative for dizziness.   Hematological:  Negative for adenopathy.   Psychiatric/Behavioral:  The patient is not nervous/anxious.    All other systems reviewed and are negative.      Objective:      Physical Exam  Constitutional:       General: She is not in acute distress.     Appearance: Normal appearance. She is well-developed. She is not diaphoretic.   HENT:      Head: Normocephalic and atraumatic.      Right Ear: Hearing, tympanic membrane, ear canal and external ear normal. No decreased hearing noted.      Left Ear: Hearing, tympanic membrane, ear canal and external ear normal. No decreased hearing noted.      Nose: No " septal deviation, mucosal edema or rhinorrhea.      Mouth/Throat:      Mouth: No oral lesions.      Dentition: Normal dentition. No dental caries.      Pharynx: No oropharyngeal exudate.   Eyes:      General: Lids are normal. Lids are everted, no foreign bodies appreciated. No scleral icterus.        Right eye: Discharge present.         Left eye: No discharge.      Conjunctiva/sclera: Conjunctivae normal.      Pupils: Pupils are equal, round, and reactive to light.   Neck:      Thyroid: No thyroid mass or thyromegaly.      Vascular: No carotid bruit or JVD.      Trachea: Trachea normal.   Cardiovascular:      Rate and Rhythm: Normal rate and regular rhythm. No extrasystoles are present.     Pulses: Normal pulses. No decreased pulses.      Heart sounds: Normal heart sounds, S1 normal and S2 normal. No murmur heard.    No friction rub. No gallop.      Comments: No swelling, edema or tenderness.  Distal pulses are normal  Pulmonary:      Effort: Pulmonary effort is normal.      Breath sounds: Normal breath sounds. No decreased breath sounds, wheezing, rhonchi or rales.   Chest:      Chest wall: No mass, tenderness or edema.   Breasts:     Right: No mass or nipple discharge.      Left: No mass or nipple discharge.   Abdominal:      General: Bowel sounds are normal. There is no distension.      Palpations: Abdomen is soft. There is no shifting dullness, hepatomegaly, splenomegaly or mass.      Tenderness: There is no abdominal tenderness.   Musculoskeletal:         General: No tenderness. Normal range of motion.      Cervical back: Normal range of motion and neck supple. No edema or erythema. No muscular tenderness. Normal range of motion.      Comments: No clubbing, no cyanosis, no petechiae   Lymphadenopathy:      Head:      Right side of head: No submental or submandibular adenopathy.      Left side of head: No submental or submandibular adenopathy.      Cervical: No cervical adenopathy.      Upper Body:      Right  upper body: No supraclavicular adenopathy.      Left upper body: No supraclavicular adenopathy.   Skin:     General: Skin is warm and dry.      Coloration: Skin is not pale.      Findings: No erythema, petechiae or rash. Rash is not purpuric.      Nails: There is no clubbing.   Neurological:      Mental Status: She is alert and oriented to person, place, and time.      Cranial Nerves: No cranial nerve deficit.      Sensory: No sensory deficit.      Motor: No abnormal muscle tone.      Coordination: Coordination normal.      Deep Tendon Reflexes: Reflexes are normal and symmetric. Reflexes normal.      Comments: Gait is normal   Psychiatric:         Speech: Speech normal.         Behavior: Behavior normal. Behavior is cooperative.         Thought Content: Thought content normal.         Judgment: Judgment normal.         LABS:  WBC   Date Value Ref Range Status   02/09/2023 9.16 3.90 - 12.70 K/uL Final     Hemoglobin   Date Value Ref Range Status   02/09/2023 12.1 12.0 - 16.0 g/dL Final     Hematocrit   Date Value Ref Range Status   02/09/2023 40.4 37.0 - 48.5 % Final     Platelets   Date Value Ref Range Status   02/09/2023 265 150 - 450 K/uL Final     Gran # (ANC)   Date Value Ref Range Status   02/09/2023 7.3 1.8 - 7.7 K/uL Final     Gran %   Date Value Ref Range Status   02/09/2023 80.2 (H) 38.0 - 73.0 % Final       Chemistry        Component Value Date/Time     02/09/2023 1048    K 4.1 02/09/2023 1048     02/09/2023 1048    CO2 26 02/09/2023 1048    BUN 12 02/09/2023 1048    CREATININE 1.1 02/09/2023 1048     (H) 02/09/2023 1048        Component Value Date/Time    CALCIUM 9.3 02/09/2023 1048    ALKPHOS 103 02/09/2023 1048    AST 9 (L) 02/09/2023 1048    ALT 8 (L) 02/09/2023 1048    BILITOT 0.2 01/12/2023 1042    ESTGFRAFRICA >60 07/27/2022 0858    EGFRNONAA >60 07/27/2022 0858          Assessment:       Problem List Items Addressed This Visit       Cancer of upper lobe of left lung - Primary     Malignant neoplasm of upper lobe of left lung    Secondary malignancy of anterior mediastinum    Type 2 diabetes mellitus without complication       Plan:          Route Chart for Scheduling    Med Onc Chart Routing      Follow up with physician . In 4 weeks schedule CBC,CMP and see me and for Imfinzi   Follow up with CL[facility]IS[facility]    Infusion scheduling note    Injection scheduling note    Labs    Imaging    Pharmacy appointment    Other referrals        Treatment Plan Information   OP DURVALUMAB 1500 MG Q4W   Mere Styles MD   Upcoming Treatment Dates - OP DURVALUMAB 1500 MG Q4W    2/9/2023       Chemotherapy       durvalumab (IMFINZI) 1,500 mg in sodium chloride 0.9% SolP 280 mL chemo infusion  3/9/2023       Chemotherapy       durvalumab (IMFINZI) 1,500 mg in sodium chloride 0.9% SolP 280 mL chemo infusion  4/6/2023       Chemotherapy       durvalumab (IMFINZI) 1,500 mg in sodium chloride 0.9% SolP 280 mL chemo infusion  5/4/2023       Chemotherapy       durvalumab (IMFINZI) 1,500 mg in sodium chloride 0.9% SolP 280 mL chemo infusion    She will proceed with Imfinzi. Reviewed Ct scans which revealed a new cavitary lesion in the anterior mediastinum. Case reviewed in tumor board and plan is to obtain IR biopsy of this mass, Already scheduled  Discussed with her that we need to see if her cancer is progressing on the immunotherapy  Will plan on seeing her in 4 weeks prior to next treatment or sooner based on pathology     Above care plan was discussed with patient and accompanying daughter and all questions were addressed to their satisfaction

## 2023-02-09 NOTE — PROGRESS NOTES
Oncology Nutrition   Chemotherapy Infusion Visit    Nutrition Follow Up   RD met with pt at chairside during infusion tx. Pt continues to do well nutritionally. Denies any nutrition related side effects, difficulty chewing, or any food intolerances. Pt weight is trending up.  Pt eligible for TFP order. Food order filled by this RD- will provide to patient at end of infusion today.       Wt Readings from Last 10 Encounters:   02/09/23 61.4 kg (135 lb 5.8 oz)   02/09/23 61.4 kg (135 lb 5.8 oz)   01/12/23 59.5 kg (131 lb 2.8 oz)   01/12/23 59.5 kg (131 lb 2.8 oz)   12/12/22 59.8 kg (131 lb 13.4 oz)   12/12/22 59.8 kg (131 lb 13.4 oz)   11/15/22 59 kg (130 lb 1.1 oz)   11/15/22 59 kg (130 lb 1.1 oz)   10/18/22 57 kg (125 lb 10.6 oz)   10/18/22 57 kg (125 lb 10.6 oz)       All other nutrition questions/concerns addressed as appropriate. Will continue to monitor prn throughout treatment.     Millie Car, DEVONTE, LDN  02/09/2023  4:25 PM

## 2023-02-09 NOTE — PLAN OF CARE
Problem: Adult Inpatient Plan of Care  Goal: Plan of Care Review  Outcome: Ongoing, Progressing  Flowsheets (Taken 2/9/2023 1400)  Plan of Care Reviewed With: patient  Goal: Patient-Specific Goal (Individualized)  Outcome: Ongoing, Progressing  Flowsheets (Taken 2/9/2023 1400)  Anxieties, Fears or Concerns: None  Individualized Care Needs: Recliner, warm blanket, conversation     Problem: Fatigue  Goal: Improved Activity Tolerance  Outcome: Ongoing, Progressing  Intervention: Promote Improved Energy  Flowsheets (Taken 2/9/2023 1400)  Fatigue Management:   frequent rest breaks encouraged   paced activity encouraged  Sleep/Rest Enhancement: regular sleep/rest pattern promoted  Activity Management: Ambulated -L4    Patient to Infusion for Imfinzi, accompanied by her dtr. Saw Dr. Styles prior to infusion. Treatment plan reviewed with patient. VSS. Tolerated infusion. Provided with copy of upcoming appointment schedule. Escorted to the front lobby for discharge to home.

## 2023-02-23 NOTE — PROGRESS NOTES
Established Patient - Audio Only Telehealth Visit     The patient location is: [place]  The chief complaint leading to consultation is: symptom management  Visit type: Virtual visit with audio only (telephone)  Total time spent with patient: 25 minutes     The reason for the audio only service rather than synchronous audio and video virtual visit was related to technical difficulties or patient preference/necessity.     Each patient to whom I provide medical services by telemedicine is:  (1) informed of the relationship between the physician and patient and the respective role of any other health care provider with respect to management of the patient; and (2) notified that they may decline to receive medical services by telemedicine and may withdraw from such care at any time. Patient verbally consented to receive this service via voice-only telephone call.       HPI: Ms. Earl is a 72 y.o. female with DM, HTN, neuropathy, and upper lobe of left lung carcinoma presents to Palliative Medicine for symptom management and GOC. Please see oncology notes for full details of oncologic history and treatment course.     02/23/2023:  [place]  reviewed and summarized:  02/15/2023 Oxycodone Hcl (Ir) 30 Mg Disp: 180 for 30 days  01/27/2023 Methadone Hcl 10 Mg Disp: 90 for 30 days  01/18/2023 Oxycodone Hcl (Ir) 30 Mg Disp: 180 for 30 days  01/06/2023 Gabapentin 800 Mg Disp: 90 for 30 days    Patient presents to audio visit with her daughter. She shares that she had her scheduled lung cavitary lesion biopsy done yesterday, which went smoothly and without acute complications. She shares that she is still having severe pain, at a level of 6-7/10 pain that is constant and worse with exertion. It is located in her chest at the site of her malignancy. She admits that she struggles with her memory and depends on her partner to help administer her medications. She tells me that she takes methadone three times a day, and sometimes when the  "pain is really bad, she takes an extra dose at night time (so taking it 4 times per day). She takes her gabapentin three times per day and oxycodone 5 times per day, which is lasting about an hour. She is agreeable with increasing the dose of her methadone. We spent a significant amount of time discussing the side effects of this medication and to withhold her sedating medications if she becomes too sleepy. Discussed that if she is taking 40 mg of methadone per day, this new prescription of 15 mg TID is a total of 45 mg of methadone, so it should not be causing significant sedation, but will achieve better pain control.    12/28/2022:  Today patient states that she has been feeling "alireza rough". She states that she caught a cold that has made her feel more congested and initially with sore throat. Her sore throat has been improving. Unfortunately during this same time period, patient had to put her mother in a nursing home. With her symptoms, she has not been able to visit her mother since going into the NH. This has taken a big toll on patient's mental/emotional health. Patient has been feeling more fatigued. She has not been sleeping well. She is always thinking about her mother at night. Patient states that her chest pain is always present but it is tolerable. She does have some days that worse than others when she uses all six doses of breakthrough medication at that time. Patient feels that her cancer is growing. She still has constant nausea. She takes her zofran with good relief of nausea. Patient's appetite is doing well, and she has gained about 5 lbs since previous visit. She reports seeing a different provider at last oncology visit. She does not remember much about what was said. She      Assessment and plan:    - increase methadone 10 mg TID to 15 mg TID (of note, was taking 10 mg QID but wasn't sure how frequently she was doing this)  - follow up in 2 weeks to follow up efficacy and side effects of " changes in pain medication  - continue oxycodone IR 30 mg PO q4h PRN breakthrough pain  - continue gabapentin 800 mg PO TID  - emotional support provided today  - will need EKG to monitor QTc   - Last QTc was 430 ms in October 2022  - patient has upcoming appointments with Dr. Styles and anticipating results of her recent lung biopsy at that time  - all questions and concerns were addressed.        This service was not originating from a related E/M service provided within the previous 7 days nor will  to an E/M service or procedure within the next 24 hours or my soonest available appointment.  Prevailing standard of care was able to be met in this audio-only visit.

## 2023-03-09 NOTE — PLAN OF CARE
DISCONTINUE ON PATHWAY REGIMEN - Non-Small Cell Lung    [id number]        Durvalumab (Imfinzi)           Additional Orders: For patients weighing less than 30 kg, only   weight-based dosing is recommended for durvalumab. For patients weighing greater   than or equal to 30 kg, weight-based or flat dosing can be used based on   indication. See PI for details. Serious immune-mediated adverse events can occur   with durvalumab. Please monitor your patient and refer to the linked   immune-mediated adverse reaction management materials for more information.    **Always confirm dose/schedule in your pharmacy ordering system**    REASON: Disease Progression  PRIOR TREATMENT: [id number]  TREATMENT RESPONSE: Progressive Disease (PD)    Non-Small Cell Lung - No Medical Intervention - Off Treatment.    Patient Characteristics:  Local Recurrence  Therapeutic Status: Local Recurrence

## 2023-03-09 NOTE — PLAN OF CARE
DISCONTINUE ON PATHWAY REGIMEN - Non-Small Cell Lung    No Medical Intervention - Off Treatment.    REASON: Disease Progression  PRIOR TREATMENT:     START ON PATHWAY REGIMEN - Non-Small Cell Lung    [id number]        Paclitaxel       Carboplatin     **Always confirm dose/schedule in your pharmacy ordering system**    Patient Characteristics:  Stage IV Metastatic, Squamous, Molecular Analysis Completed, Alteration Present   and Targeted Therapy Exhausted or EGFR Exon 20 Insertion or KRAS G12C or HER2   Present, and No Prior Chemo/Immunotherapy or No Alteration Present, PS = 0, 1,   Initial Chemotherapy/Immunotherapy, No Alteration Present, Not a Candidate for   Immunotherapy  Therapeutic Status: Stage IV Metastatic  Histology: Squamous Cell  Molecular Analysis Results: No Alteration Present  ECOG Performance Status: 1  Chemotherapy/Immunotherapy Line of Therapy: Initial Chemotherapy/Immunotherapy  Immunotherapy Candidate Status: Not a Candidate for Immunotherapy  Intent of Therapy:  Non-Curative / Palliative Intent, Discussed with Patient

## 2023-03-10 NOTE — PROGRESS NOTES
Pharmacy Treatment Plan Review    Patient  Teresa Earl, 72 y.o.  1950    Indication: NSCLC     History:    Squamous cell    She completed definitive chemo/RT with weekly Carboplatin and Taxol (4/14/2022) and completed Radiation on 4/26/2022    CT scans which revealed a new area in the anterior mediastinum, Biopsy reveals squamous cell cancer. This indicates progression outside the radiation treatment fields. Discontinuing durvalumab, Plan on starting chemo with Carboplatin and Taxol    Labs:  CBC  Lab Results   Component Value Date    WBC 10.56 03/09/2023    HGB 11.3 (L) 03/09/2023    HCT 38.2 03/09/2023    MCV 92 03/09/2023     03/09/2023       BMP  Lab Results   Component Value Date     03/09/2023    K 4.5 03/09/2023     03/09/2023    CO2 26 03/09/2023    BUN 20 03/09/2023    CREATININE 1.1 03/09/2023    CALCIUM 8.8 03/09/2023    ANIONGAP 10 03/09/2023    ESTGFRAFRICA >60 07/27/2022    EGFRNONAA >60 07/27/2022       LFTs  Lab Results   Component Value Date    ALT 7 (L) 03/09/2023    AST 8 (L) 03/09/2023    ALKPHOS 87 03/09/2023    BILITOT 0.3 03/09/2023       The patient is scheduled to start the following infusion:   OP NSCLC PACLITAXEL CARBOPLATIN Q3W    21-day cycle for 6 cycles of:    -Paclitaxel 175 mg/m2 in sodium chloride 0.9% 500 mL, infusion, intravneous, on day 1 (dose reduced from 200 mg/m2)    -Carboplatin AUC of 5 in sodium chloride 0.9% 500 mL, infusion, intravneous, on day 1 (reduced from AUC 6 to AUC 5)    Premeds  -Aprepitant 130 mg IVP  -Diphenhydramine 50 mg IVPB  -Famotidine 20 mg IV  -Palonosetron 0.25 mg/Dexamethasone 20 mg IVPB    The treatment plan is per [facility] guidelines    Juan Carlos Jennings PharmD

## 2023-03-13 NOTE — PROGRESS NOTES
Established Patient - Audio Only Telehealth Visit     The patient location is: home/[place], [place]  The chief complaint leading to consultation is: cancer related pain  Visit type: Virtual visit with audio only (telephone)  Total time spent with patient: 45       The reason for the audio only service rather than synchronous audio and video virtual visit was related to technical difficulties or patient preference/necessity.     Each patient to whom I provide medical services by telemedicine is:  (1) informed of the relationship between the physician and patient and the respective role of any other health care provider with respect to management of the patient; and (2) notified that they may decline to receive medical services by telemedicine and may withdraw from such care at any time. Patient verbally consented to receive this service via voice-only telephone call.       HPI: Ms. Earl is a 72 y.o. female with DM, HTN, neuropathy, and upper lobe of left lung carcinoma presents to Palliative Medicine for symptom management and GOC. Please see oncology notes for full details of oncologic history and treatment course.      Assessment and plan:    3/13/2023  [place]  reviewed  02/15/2023 Oxycodone Hcl (Ir) 30 Mg Tab 180.00 30 Tho [place]   01/27/2023 Methadone Hcl 10 Mg Tablet 90.00 30 Ail Kika Pandya     Discussed via audio visit today. The pt sees  on the [facility] and receives her treatment there.  Has not been seen visually in 10/2023 in [facility] clinic, opting for audio visits.  Discussed the importance of in person visits for safety and evaluation of any changes that are ever made.  Pt voiced understanding and open to being seen at [facility].     Reviewed visit from 3/9 with Dr. Styles the biopsy results and plan to alter treatment regimen from Imfinzi, to starting chemo with Carboplatin and Taxol.  Had tolerated well previously.    States pain is adequately controlled and takes prn oxy 30  "mg 3-4 x's day. No difficulty tolerating.  Sleeping well.  Reports constipation and not taking anything regularly for this.  Has taken senna in the past and uses suppositories and dulcolax prn.  Expressed the importance of daily senna 2 tabs bid and miralax until relief and continuing senna indefinitely. States appetite has been "too good" and reports 5 lb weight gain.     Radiologic studies:   CT scans from 1/27/2023 reveals "Interval enlargement of the anterior mediastinal mass in size with development of an air-filled cystic cavitary component.  This most likely relates to fistulization with the adjacent lung.  Air forming organism an abscess would seem less likely but difficult to exclude.  No obvious fluid collection is noted Several small pulmonary nodules/regions of consolidation near the lung apices bilaterally favored to be new since the prior with a reticulonodular appearance.  Infectious and inflammatory as well as neoplastic etiologies is on differential.  Close follow-up for stability or resolution is necessary. Renal hypodensities without worrisome change since the prior statistically favored relate to cysts. Left ovarian hypodensity is stable since the prior of uncertain etiology.  Correlation with female pelvis ultrasound in female pelvis ultrasound follow-up for size stability would be reasonable. Stable precarinal mediastinal node"      Assessment and plan:    - cont methadone 15 mg TID; needs a follow up EKG   - continue oxycodone IR 30 mg PO q4h PRN breakthrough pain  - senna 2 tabs po bid + miralax daily  - continue gabapentin 800 mg PO TID  - emotional support provided today  - will need EKG to monitor Qtc; requested to be done at [facility]; pt also unaware of planned chemotherapy infusion today; will notify team  - Last QTc was 430 ms in October 2022  - patient has upcoming appointments with [facility]. Styles at [facility]; will work to arrange to be seen in person at [facility] clinic there.  - ACP: pt has " HCPOA; need to ensure pt has meaningful and realistic discussions with family (2 adult children live in [place])  - all questions and concerns were addressed.        This service was not originating from a related E/M service provided within the previous 7 days nor will  to an E/M service or procedure within the next 24 hours or my soonest available appointment.  Prevailing standard of care was able to be met in this audio-only visit.      Maximo Dao MD  Palliative Medicine   [facility]  [phone] (cell)

## 2023-03-13 NOTE — TELEPHONE ENCOUNTER
Spoke with Paulette at [facility] to request scheduling pt for fu appointment in 1 month as patient has difficulty coming to [place] for inpatient visits.She states that she will get her scheduled.

## 2023-03-13 NOTE — Clinical Note
Trying to have her seen by someone on the [facility] in palliative medicine.  It's not good for us to prescribe what we are and not seeing her in person.

## 2023-03-14 NOTE — TELEPHONE ENCOUNTER
----- Message from Anais Stoner sent at 3/14/2023  8:03 AM CDT -----  Regarding: reschedule appointment  Contact: orquidea Gee  Type:  Sooner Appointment Request    Caller is requesting a sooner appointment.  Caller declined first available appointment listed below.  Caller will not accept being placed on the waitlist and is requesting a message be sent to doctor.    Name of Caller:  orquidea Gee  When is the first available appointment?  03/14/23 9:30  Symptoms:  infusion  Best Call Back Number:  [phone] (home)   Additional Information:  Patient unable to come for appointment this morning.  Please call patient to reschedule. Thanks,

## 2023-03-20 NOTE — PROGRESS NOTES
Oncology Nutrition   Chemotherapy Infusion Visit    Nutrition Follow Up   RD met with pt at chairside during infusion tx. Pt starting new treatment today of taxol along with carbo. Pt continues to do well nutritionally - maintaining weight, chewing without difficulty and denies any nutrition related side effects. Pt weight is trending up.     Pt eligible for TFP food order. Food order filled by this RD- will provide to patient at end of infusion today.       Wt Readings from Last 10 Encounters:   03/20/23 62.7 kg (138 lb 3.7 oz)   03/09/23 63.7 kg (140 lb 6.9 oz)   02/22/23 61.7 kg (136 lb)   02/09/23 61.4 kg (135 lb 5.8 oz)   02/09/23 61.4 kg (135 lb 5.8 oz)   01/12/23 59.5 kg (131 lb 2.8 oz)   01/12/23 59.5 kg (131 lb 2.8 oz)   12/12/22 59.8 kg (131 lb 13.4 oz)   12/12/22 59.8 kg (131 lb 13.4 oz)   11/15/22 59 kg (130 lb 1.1 oz)       All other nutrition questions/concerns addressed as appropriate. Will continue to monitor prn throughout treatment.     Millie Car, JANISN, LDN  03/20/2023  10:57 AM

## 2023-03-22 NOTE — TELEPHONE ENCOUNTER
Called pt, spoke with patient regarding FA with José Miguel. Left their phone#[phone] press option 9.

## 2023-03-27 NOTE — PLAN OF CARE
Pt tolerated IVF's well today. Repeat glucose done in lab. JOHN Woodard NP notified and at chairside. Patient instructed to go to ER to be followed up and treated accordingly. Patient and daughter verbalized understanding.  Reviewed follow-up appointments. All questions were answered, daughter pushed patient down for discharge in wheelchair.

## 2023-03-27 NOTE — PROGRESS NOTES
Urgent Care Oncology Visit    Date and Time: 03/27/2023 1:42 PM   Patient MRN: [id number]   Chief Complaint:   Chief Complaint   Patient presents with    Nausea     Weakness, nausea      Reason for Urgent Care Visit: nausea and vomiting  Intervention: education provided, labs ordered, prescriptions sent, and sent to infusion / IV fluids  Dispo: return to clinic for urgent care oncology follow up  UrgOnc appointment addressed via: [facility] message  This UrgOnc visit was in person  Time spent handling UC issue:  45    Was this virtual or in person UrgOnc visit appropriate? yes       PATIENT: Teresa Earl  MRN: [id number]  DATE: 3/27/2023      Diagnosis:   1. Malignant neoplasm of upper lobe of left lung    2. Oral candidiasis    3. Type 2 diabetes mellitus without complication, without long-term current use of insulin    4. Dehydration    5. Nausea and vomiting, unspecified vomiting type        Chief Complaint: Nausea (Weakness, nausea )    Subjective:   HPI: Ms. Earl is a 72 y.o. female with PMH HTN, Diabetes Mellitus type 2, rhabdomyolysis  who was recently started on Carbo/Taxol for progressive NSCLC.   C1D1 was on 3/20/23    Patient was not aware she needed to  Dexamethasone prescription prior to starting this regimen therefore did not get the Rx until 3/23. She took 12 mg for 4 days 3/23-3/26.     Began feeling poorly almost immediately after her infusion on 3/20/23. Has been feeling weak, experiencing nausea, some vomiting when taking medications (described as gagging, primarily sputum). She has not been taking in fluids or much food over the past 3-4 days. Taking Ondansetron once during the day and Phenergan at night.   Endorses some bumps in her mouth, no pain, no sore throat. Has [facility] has not been using this.  Chronic SOB and weakness over the past week.     Labs today show blood sugar is 593, currently only taking Jardiance.  Her Cr is elevated at 1.6, eGFR 34. WBC is 15.12  Weight is down 13 lbs in  "one week according to the scale here, 5 lbs at home  Denies HA, CP, cough, abd pain, diarrhea, constipation, dysuria, hematuria, swelling. No fevers, chills, sweats.     Prior History:   Underwent a chest xray in September 2022 prior to a discectomy and that showed a lung mass.      She then underwent CT chest also on September 2022 which showed spiculated mass in anterior segment GRACIELA, multiple borderline/enlarged mediastinal lymph nodes.     PFT's on 11/5/21 revealed FVC 1.96 (61%), FEV1 1.47 (60%), TLC 73%, DLCO 9.18 (47%)     PET/CT on 11/11/2022 reveals  FDG avid, spiculated pleural based mass in the GRACIELA extending into the anterior mediastinum, measuring 3.5cm. SUV 8.5. no enlarged or FDG avid lymphadenopathy in chest. No distant mets but FDG avid L parotid lesion     Navigational bronch on 12/13/2021 revealed "no endobronchial disease medial GRACIELA sampled. EBUS for station 4L, 7, 11. Path: GRACIELA suspicious but not diagnostic for malignancy. 4L, 7, 11L negative for malignancy"     On 1/25/22: he met with CT surgery and is felt not to be a candidate for resection due to invasion of mediastinum.     MRI brain on 2/12/2022 revealed  no intracranial mets     CT Guided biopsy on 2/24/2022 reveals  of GRACIELA demonstrating squamous cell carcinoma. Left parotid gland demonstrating pleomorphic adenomal     2/28/2022 CT revealed "56 x 39 x 15 mm spiculated pleural-based left upper lobe mass (compatible with biopsy proven diagnosis of squamous cell carcinoma) which abuts and invades the anterior mediastinum with compression and/or invasion of the brachial cephalic vein and intimate association with the left internal mammary vessels and anterior margin of the thoracic aortic arch. Scattered solid pulmonary nodules measuring up to 3 mm in size for which short-term imaging follow-up is recommended. Centrilobular emphysematous lung architecture.  21 x 22 mm hypodense area within the medial segment of the left hepatic lobe abutting " "the interlobar fissure.  While it is possible that this represents focal fatty infiltration, an underlying solitary liver lesion or focus of metastatic disease cannot be excluded.  The patient's recent PET-CT scan showed no abnormal FDG hypermetabolism in this vicinity.  Consider additional investigation with contrast enhanced MRI of the liver. Stable 26 x 39 mm left adnexal hypodensity which show no FDG hypermetabolism at the time of the patient's recent PET-CT scan. Tiny fat containing umbilical hernia. 12 mm short axis right paratracheal lymph node which showed no FDG hypermetabolism at the time of the patient's recent PET-CT scan and which remains unchanged in size since that study"    3/3/2022 MR abd reveals "Hypoattenuating area on prior CT near the falciform ligament corresponds to an area of focal fatty infiltration.  No suspicious liver lesions are present"         She completed definitive chemo/RT with weekly Carboplatin and Taxol (4/14/2022) and completed Radiation on 4/26/2022    5/10/2022 CT scans "Interval decrease in the size of the left upper lobe tumor and precarinal mediastinal lymph node. Interval decrease in the extent of the consolidation at the base of the left lower lobe. Chronic findings of pulmonary emphysema and bilateral renal cysts"    She started consolidation Imfinzi on 5/12/2022 1/27/2023 CT scans reveal "Interval enlargement of the anterior mediastinal mass in size with development of an air-filled cystic cavitary component.  This most likely relates to fistulization with the adjacent lung.  Air forming organism an abscess would seem less likely but difficult to exclude.  No obvious fluid collection is noted Several small pulmonary nodules/regions of consolidation near the lung apices bilaterally favored to be new since the prior with a reticulonodular appearance.  Infectious and inflammatory as well as neoplastic etiologies is on differential.  Close follow-up for stability or " "resolution is necessary. Renal hypodensities without worrisome change since the prior statistically favored relate to cysts. Left ovarian hypodensity is stable since the prior of uncertain etiology.  Correlation with female pelvis ultrasound in female pelvis ultrasound follow-up for size stability would be reasonable. Stable precarinal mediastinal node"    CT guided biopsy reveals squamous cell.      Oncology History   Cancer of upper lobe of left lung   2/17/2022 Initial Diagnosis    Cancer of upper lobe of left lung     2/17/2022 Cancer Staged    Staging form: Lung, AJCC 8th Edition  - Clinical stage from 2/17/2022: Stage IIIA (cT4, cN0, cM0)        Radiation Therapy    Treating physician: Sidney Sanders      Treatment Summary  Course: C1 Chest 2022  Treatment Site Ref. ID Energy Dose/Fx (Gy) #Fx Dose Correction (Gy) Total Dose (Gy) Start Date End Date Elapsed Days   IM Lung Lung 6X 2 30 / 30 0 60 3/10/2022 4/26/2022 47      3/20/2023 -  Chemotherapy    Treatment Summary   Plan Name: OP NSCLC PACLITAXEL CARBOPLATIN Q3W  Treatment Goal: Palliative  Status: Active  Start Date: 3/20/2023  End Date: 7/3/2023 (Planned)  Provider: Mere Styles MD  Chemotherapy: CARBOplatin (PARAPLATIN) 360 mg in sodium chloride 0.9% 321 mL chemo infusion, 360 mg (100 % of original dose 357.5 mg), Intravenous, Clinic/HOD 1 time, 1 of 6 cycles  Dose modification:   (original dose 357.5 mg, Cycle 1),   (Cycle 2),   (Cycle 5)  Administration: 360 mg (3/20/2023)  PACLitaxeL (TAXOL) 175 mg/m2 = 306 mg in sodium chloride 0.9% 500 mL chemo infusion, 175 mg/m2 = 306 mg (100 % of original dose 175 mg/m2), Intravenous, Clinic/HOD 1 time, 1 of 6 cycles  Dose modification: 175 mg/m2 (original dose 175 mg/m2, Cycle 1, Reason: Other (see comments))  Administration: 306 mg (3/20/2023)       Malignant neoplasm of upper lobe of left lung   2/17/2022 Initial Diagnosis    Malignant neoplasm of upper lobe of left lung     3/10/2022 - 4/14/2022 Chemotherapy    " Treatment Summary   Plan Name: OP NSCLC PACLITAXEL + CARBOPLATIN (AUC) QW + RADIATION  Treatment Goal: Curative  Status: Inactive  Start Date: 3/10/2022  End Date: 4/14/2022  Provider: Mere Styles MD  Chemotherapy: CARBOplatin (PARAPLATIN) 140 mg in sodium chloride 0.9% 250 mL chemo infusion, 140 mg (100 % of original dose 139.8 mg), Intravenous, Clinic/HOD 1 time, 6 of 6 cycles  Dose modification:   (original dose 139.8 mg, Cycle 1),   (original dose 137.2 mg, Cycle 2),   (original dose 137.2 mg, Cycle 3),   (original dose 137.2 mg, Cycle 4),   (original dose 115.4 mg, Cycle 5),   (original dose 145.2 mg, Cycle 6)  Administration: 140 mg (3/10/2022), 135 mg (3/17/2022), 135 mg (3/25/2022), 135 mg (3/31/2022), 115 mg (4/7/2022), 145 mg (4/14/2022)  PACLitaxeL (TAXOL) 45 mg/m2 = 78 mg in sodium chloride 0.9% 250 mL chemo infusion, 45 mg/m2 = 78 mg, Intravenous, Clinic/HOD 1 time, 6 of 6 cycles  Dose modification: 45 mg/m2 (original dose 45 mg/m2, Cycle 4)  Administration: 78 mg (3/10/2022), 78 mg (3/17/2022), 78 mg (3/25/2022), 78 mg (3/31/2022), 78 mg (4/7/2022), 78 mg (4/14/2022)       5/12/2022 - 2/9/2023 Chemotherapy    Treatment Summary   Plan Name: OP DURVALUMAB 1500 MG Q4W  Treatment Goal: Palliative  Status: Inactive  Start Date: 5/12/2022  End Date: 2/9/2023  Provider: Mere Styles MD  Chemotherapy: [No matching medication found in this treatment plan]        Radiation Therapy    Treating physician: Sidney Sanders      Treatment Summary  Course: C1 Chest 2022  Treatment Site Ref. ID Energy Dose/Fx (Gy) #Fx Dose Correction (Gy) Total Dose (Gy) Start Date End Date Elapsed Days   IM Lung Lung 6X 2 30 / 30 0 60 3/10/2022 4/26/2022 47      3/20/2023 -  Chemotherapy    Treatment Summary   Plan Name: OP NSCLC PACLITAXEL CARBOPLATIN Q3W  Treatment Goal: Palliative  Status: Active  Start Date: 3/20/2023  End Date: 7/3/2023 (Planned)  Provider: Mere Styles MD  Chemotherapy: CARBOplatin (PARAPLATIN) 360 mg in  sodium chloride 0.9% 321 mL chemo infusion, 360 mg (100 % of original dose 357.5 mg), Intravenous, Clinic/HOD 1 time, 1 of 6 cycles  Dose modification:   (original dose 357.5 mg, Cycle 1),   (Cycle 2),   (Cycle 5)  Administration: 360 mg (3/20/2023)  PACLitaxeL (TAXOL) 175 mg/m2 = 306 mg in sodium chloride 0.9% 500 mL chemo infusion, 175 mg/m2 = 306 mg (100 % of original dose 175 mg/m2), Intravenous, Clinic/HOD 1 time, 1 of 6 cycles  Dose modification: 175 mg/m2 (original dose 175 mg/m2, Cycle 1, Reason: Other (see comments))  Administration: 306 mg (3/20/2023)          Past Medical History:   Past Medical History:   Diagnosis Date    Acute kidney injury     Cervical radiculopathy     Cigarette smoker 1/26/2022    Diabetes mellitus     Hypertension     Mass of upper lobe of left lung 1/26/2022    Neuropathy     Rhabdomyolysis        Past Surgical HIstory:   Past Surgical History:   Procedure Laterality Date    BILATERAL TUBAL LIGATION      ENDOBRONCHIAL ULTRASOUND N/A 12/13/2021    Procedure: ENDOBRONCHIAL ULTRASOUN;  Surgeon: William Padgett Jr., DO;  Location: [facility];  Service: Pulmonary;  Laterality: N/A;    HYSTERECTOMY      INSERTION OF TUNNELED CENTRAL VENOUS CATHETER (CVC) WITH SUBCUTANEOUS PORT N/A 3/9/2022    Procedure: [id number];  Surgeon: Jos Solitario MD;  Location: [facility] OR;  Service: General;  Laterality: N/A;    NAVIGATIONAL BRONCHOSCOPY Left 12/13/2021    Procedure: BRONCHOSCOPY, NAVIGATIONAL;  Surgeon: William Padgett Jr., DO;  Location: [facility];  Service: Pulmonary;  Laterality: Left;       Family History:   Family History   Problem Relation Age of Onset    Valvular heart disease Mother        Social History:  reports that she has been smoking cigarettes. She has been smoking an average of .5 packs per day. She has never used smokeless tobacco. She reports that she does not drink alcohol.    Allergies:  Review of patient's allergies indicates:   Allergen Reactions    Codeine Hives  and Other (See Comments)     Other reaction(s): Unknown  Other reaction(s): Unknown      Ibuprofen Swelling    Morphine Diarrhea and Other (See Comments)     Other reaction(s): Renal Dysfunctions  Other reaction(s): Renal Dysfunctions         Medications:  Current Outpatient Medications   Medication Sig Dispense Refill    albuterol (PROVENTIL/VENTOLIN HFA) 90 mcg/actuation inhaler Inhale 2 puffs into the lungs every 6 (six) hours as needed for Wheezing or Shortness of Breath. Rescue 1 g 5    BREO ELLIPTA 100-25 mcg/dose diskus inhaler Inhale 2 puffs into the lungs once daily.      BREO ELLIPTA 100-25 mcg/dose diskus inhaler Inhale 1 puff into the lungs.      dexAMETHasone (DECADRON) 6 MG tablet Take 2 tablets daily for 3 days with breakfast, starting the day after chemo. 40 tablet 0    diclofenac (VOLTAREN) 75 MG EC tablet Take 75 mg by mouth 2 (two) times daily.      duke's soln (benadryl 30 mL, mylanta 30 mL, LIDOcaine 30 mL, nystatin 30 mL) 120mL Take 10 mLs by mouth 4 (four) times daily. 120 mL 1    empagliflozin (JARDIANCE) 25 mg tablet Take 1 tablet by mouth once daily.      FLUoxetine 20 MG capsule TAKE TWO CAPSULES BY MOUTH EVERY MORNING 30 capsule 2    FLUoxetine 40 MG capsule Take 1 capsule (40 mg total) by mouth once daily. 30 capsule 11    gabapentin (NEURONTIN) 800 MG tablet Take 1 tablet (800 mg total) by mouth 3 (three) times daily. 90 tablet 0    LIDOcaine-prilocaine (EMLA) cream Apply topically as needed (Prior to port access). 30 g 0    lovastatin (MEVACOR) 10 MG tablet Take 10 mg by mouth nightly.      metFORMIN (GLUCOPHAGE) 1000 MG tablet Take 1,000 mg by mouth 2 (two) times daily.      methadone (DOLOPHINE) 10 MG tablet Take 1.5 tablets (15 mg total) by mouth 3 (three) times daily. 135 tablet 0    mupirocin (BACTROBAN) 2 % ointment       naloxone (NARCAN) 4 mg/actuation Spry 4mg by nasal route as needed for opioid overdose; may repeat every 2-3 minutes in alternating nostrils until medical help  arrives. Call 911 1 each 11    ondansetron (ZOFRAN) 8 MG tablet Take 1 tablet (8 mg total) by mouth every 8 (eight) hours as needed for Nausea. 60 tablet 2    oxyCODONE (ROXICODONE) 30 MG Tab Take 1 tablet (30 mg total) by mouth every 4 (four) hours as needed. 180 tablet 0    polyethylene glycol (GLYCOLAX) 17 gram PwPk Take 17 g by mouth once daily. 100 each 0    prochlorperazine (COMPAZINE) 5 MG tablet Take 1 tablet (5 mg total) by mouth every 6 (six) hours as needed for Nausea. 60 tablet 2    SENNA 8.6 mg tablet Take 2 tablets by mouth 2 (two) times daily. 90 tablet 3    nystatin (MYCOSTATIN) 100,000 unit/mL suspension Take 6 mLs (600,000 Units total) by mouth 4 (four) times daily. for 10 days 240 mL 0    promethazine (PHENERGAN) 12.5 MG Tab Take 1 tablet (12.5 mg total) by mouth every 6 (six) hours as needed (N/V). 30 tablet 0     No current facility-administered medications for this visit.     Facility-Administered Medications Ordered in Other Visits   Medication Dose Route Frequency Provider Last Rate Last Admin    0.9%  NaCl infusion  1,000 mL Intravenous 1 time in Clinic/HOD Chhaya Woodard NP        heparin, porcine (PF) 100 unit/mL injection flush 500 Units  500 Units Intravenous PRN Chhaya Woodard NP        ondansetron injection 4 mg  4 mg Intravenous Once PRN Chhaya Woodard NP        sodium chloride 0.9% flush 10 mL  10 mL Intravenous PRN Chhaya Woodard NP           Review of Systems   Constitutional:  Positive for activity change, appetite change, fatigue and unexpected weight change. Negative for chills and fever.   HENT:  Negative for sore throat and trouble swallowing.    Respiratory:  Positive for shortness of breath. Negative for cough.    Cardiovascular:  Negative for chest pain, palpitations and leg swelling.   Gastrointestinal:  Positive for nausea and vomiting. Negative for abdominal pain, constipation and diarrhea.   Genitourinary:  Negative for dysuria and hematuria.   Musculoskeletal:  Negative  "for myalgias.   Skin:  Negative for rash.   Neurological:  Negative for light-headedness, numbness and headaches.   Psychiatric/Behavioral:  The patient is not nervous/anxious.      ECOG Performance Status:   ECOG SCORE    1 - Restricted in strenuous activity-ambulatory and able to carry out work of a light nature         Objective:      Vitals:   Vitals:    03/27/23 1258   BP: (!) 142/90   BP Location: Left arm   Patient Position: Sitting   BP Method: Medium (Automatic)   Pulse: (!) 120   Resp: 16   Temp: 97.5 °F (36.4 °C)   TempSrc: Temporal   SpO2: 98%   Weight: 57.1 kg (125 lb 12.4 oz)   Height: 5' 8" (1.727 m)     BMI: Body mass index is 19.12 kg/m².    Physical Exam  Vitals and nursing note reviewed.   Constitutional:       General: She is not in acute distress.     Appearance: She is not diaphoretic.   HENT:      Head: Normocephalic and atraumatic.      Mouth/Throat:      Comments: White patches/exudate covering tongue and left buccal mucosa   Eyes:      General: No scleral icterus.  Cardiovascular:      Rate and Rhythm: Regular rhythm. Tachycardia present.      Heart sounds: Normal heart sounds. No murmur heard.  Pulmonary:      Effort: Pulmonary effort is normal. No respiratory distress.      Breath sounds: No rhonchi.   Abdominal:      General: Bowel sounds are normal. There is no distension.      Tenderness: There is no abdominal tenderness. There is no guarding.   Musculoskeletal:      Right lower leg: No edema.      Left lower leg: No edema.   Skin:     General: Skin is warm.      Coloration: Skin is not jaundiced.      Findings: No bruising or rash.   Neurological:      Mental Status: She is alert and oriented to person, place, and time.   Psychiatric:         Behavior: Behavior normal.       Laboratory Data:  Lab Results   Component Value Date    WBC 15.12 (H) 03/27/2023    HGB 15.6 03/27/2023    HCT 49.6 (H) 03/27/2023    MCV 87 03/27/2023     03/27/2023        Imaging:   Assessment:       1. " Malignant neoplasm of upper lobe of left lung    2. Oral candidiasis    3. Type 2 diabetes mellitus without complication, without long-term current use of insulin    4. Dehydration    5. Nausea and vomiting, unspecified vomiting type           Plan:   Malignant neoplasm of the lung, left  -Started Carbo/Taxol for progressive NSCLC.   -C1D1 was on 3/20/23  -Patient with significant nausea, weakness after C1  -IV fluids today, IV ondansetron given   -Will follow up with her tomorrow with CBC, CMP, UA     Oral Candidiasis  -Prescription for Nystatin solution to use 4 x daily sent to pharmacy  -Patient encouraged to use Triptelligent MW for discomfort as well  -Monitor     Type 2 diabetes  -Taking Jardiance   -BS today is 593, hyperglycemia is likely related to use of Dex a x 4 days   -IVF today   -recheck BS prior to leaving clinic today   -Patient to schedule appointment with PCP prior to next cycle to discuss coverage for days she takes steroids with tx  -repeat CMP tomorrow    Nausea and vomiting  Dehydration   -IV Zofran and IVF 1 L NS today   -Refill Phenergan sent to pharmacy  -Advised to be more aggressive with antiemetics at home     Hyponatremia   -Sodium is 130 today  -Will get IVF today  -Can check TSH, T4 tomorrow   -UA today or tomorrow     Leukocytosis   -Patient took Dex 12 mg x 4 days with the last dose yesterday 3/26  -Will monitor with repeat CBC tomorrow   -UA today     Assessment/Plan reviewed and approved by Dr. Styles   45 minutes were spent in coordination of patient's care, record review and counseling.    Discussed with patient and family member that repeat blood sugar is 536. Encouraged and instructed to go to ER for evaluation, to get blood sugar controlled.    Route Chart for Scheduling    Med Onc Chart Routing      Follow up with physician    Follow up with CLARISSE Other. Tomorrow with me, CBC, CMP prior to visit   Infusion scheduling note Get UA today if able (can get tomorrow if unable), Proceed with  1 L NS and IV Zofran PRN   Injection scheduling note    Labs    Imaging    Pharmacy appointment    Other referrals           Treatment Plan Information   OP NSCLC PACLITAXEL CARBOPLATIN Q3W   Mere Styles MD   Upcoming Treatment Dates - OP NSCLC PACLITAXEL CARBOPLATIN Q3W    4/10/2023       Pre-Medications       diphenhydrAMINE (BENADRYL) 50 mg in NS 50 mL IVPB       famotidine (PF) injection 20 mg       Chemotherapy       PACLitaxeL (TAXOL) 175 mg/m2 = 306 mg in sodium chloride 0.9% 500 mL chemo infusion       CARBOplatin (PARAPLATIN) in sodium chloride 0.9% 250 mL chemo infusion       Antiemetics       aprepitant (CINVANTI) injection 130 mg       palonosetron 0.25mg/dexAMETHasone 20mg in NS IVPB 0.25 mg 50 mL  5/1/2023       Pre-Medications       diphenhydrAMINE (BENADRYL) 50 mg in NS 50 mL IVPB       famotidine (PF) injection 20 mg       Chemotherapy       PACLitaxeL (TAXOL) 175 mg/m2 = 306 mg in sodium chloride 0.9% 500 mL chemo infusion       CARBOplatin (PARAPLATIN) in sodium chloride 0.9% 250 mL chemo infusion       Antiemetics       aprepitant (CINVANTI) injection 130 mg       palonosetron 0.25mg/dexAMETHasone 20mg in NS IVPB 0.25 mg 50 mL  5/22/2023       Pre-Medications       diphenhydrAMINE (BENADRYL) 50 mg in NS 50 mL IVPB       famotidine (PF) injection 20 mg       Chemotherapy       PACLitaxeL (TAXOL) 175 mg/m2 = 306 mg in sodium chloride 0.9% 500 mL chemo infusion       CARBOplatin (PARAPLATIN) in sodium chloride 0.9% 250 mL chemo infusion       Antiemetics       aprepitant (CINVANTI) injection 130 mg       palonosetron 0.25mg/dexAMETHasone 20mg in NS IVPB 0.25 mg 50 mL  6/12/2023       Pre-Medications       diphenhydrAMINE (BENADRYL) 50 mg in NS 50 mL IVPB       famotidine (PF) injection 20 mg       Chemotherapy       PACLitaxeL (TAXOL) 175 mg/m2 = 306 mg in sodium chloride 0.9% 500 mL chemo infusion       CARBOplatin (PARAPLATIN) in sodium chloride 0.9% 250 mL chemo infusion       Antiemetics        aprepitant (CINVANTI) injection 130 mg       palonosetron 0.25mg/dexAMETHasone 20mg in NS IVPB 0.25 mg 50 mL    Therapy Plan Information  IV Fluids  sodium chloride 0.9% bolus 1,000 mL 1,000 mL  1,000 mL, Intravenous, Every visit  5. Medications  ondansetron injection 4 mg  4 mg, Intravenous, Every visit  Flushes  sodium chloride 0.9% flush 10 mL  10 mL, Intravenous, PRN  heparin, porcine (PF) 100 unit/mL injection flush 500 Units  500 Units, Intravenous, PRN

## 2023-03-27 NOTE — PLAN OF CARE
Problem: Adult Inpatient Plan of Care  Goal: Plan of Care Review  Outcome: Ongoing, Progressing  Goal: Patient-Specific Goal (Individualized)  Outcome: Ongoing, Progressing  Flowsheets (Taken 3/27/2023 1410)  Anxieties, Fears or Concerns: nausea/vomiting for a week  Individualized Care Needs: recliner, pillow, pillow, water, daughter at chairside.     Problem: Fatigue  Goal: Improved Activity Tolerance  Outcome: Ongoing, Progressing  Intervention: Promote Improved Energy  Flowsheets (Taken 3/27/2023 1410)  Fatigue Management:   activity schedule adjusted   paced activity encouraged   frequent rest breaks encouraged  Sleep/Rest Enhancement:   natural light exposure provided   noise level reduced   room darkened   family presence promoted   regular sleep/rest pattern promoted  Activity Management:   Walk with assistive devise and /or staff member - L3   Up in stretcher chair - L1

## 2023-03-28 NOTE — PROGRESS NOTES
PATIENT: Teresa Earl  MRN: [id number]  DATE: 3/28/2023      Diagnosis:   1. Malignant neoplasm of upper lobe of left lung    2. Oral candidiasis    3. Type 2 diabetes mellitus without complication, without long-term current use of insulin    4. Nausea and vomiting, unspecified vomiting type    5. Dehydration    6. Immunodeficiency due to drugs    7. Leukocytosis, unspecified type        Chief Complaint: Lung Cancer    Subjective:   HPI: Ms. Earl is a 72 y.o. female with PMH HTN, Diabetes Mellitus type 2, rhabdomyolysis  who was recently started on Carbo/Taxol for progressive NSCLC.   C1D1 was on 3/20/23    Patient was not aware she needed to  Dexamethasone prescription prior to starting this regimen therefore did not get the Rx until 3/23. She took 12 mg for 4 days 3/23-3/26.     3/27/2023  Began feeling poorly almost immediately after her infusion on 3/20/23. Has been feeling weak, experiencing nausea, some vomiting when taking medications (described as gagging, primarily sputum). She has not been taking in fluids or much food over the past 3-4 days. Taking Ondansetron once during the day and Phenergan at night.   Endorses some bumps in her mouth, no pain, no sore throat. Has Russell MW has not been using this.  Chronic SOB and weakness over the past week.     Labs today show blood sugar is 593, currently only taking Jardiance.  Her Cr is elevated at 1.6, eGFR 34. WBC is 15.12  Weight is down 13 lbs in one week according to the scale here, 5 lbs at home  Denies HA, CP, cough, abd pain, diarrhea, constipation, dysuria, hematuria, swelling. No fevers, chills, sweats.     Today 3/28/2023  Patient in for follow up today after getting IV fluids and IV zofran yesterday. She was advised to go to ER due to blood sugars remaining >500 after infusion but she opted to push fluids at home. Accompanied by her daughter today.    Nausea has improved, she is still feeling fatigued. Has been able to keep down smoothies, soups  "and electrolyte drinks and an egg this morning. She has been able to keep all of her medications down.   B/P today is improved at 118/88. HR is improved at 97 bpm. Weight is stable from yesterday indicating the weight loss yesterday was accurate.   Labs today show WBC is trending down, Hgb and platelets stable. Cr is normal today at 1.1, eGFR is improved to baseline. Sodium improved to 133 (130 on 3/27/23) and blood glucose is down to 241  When asked how often she has used the Nystatin rinse, she stated only once last evening.   Denies HA, CP, cough, abd pain, diarrhea, constipation, dysuria, hematuria, swelling. No fevers, chills, sweats.     Prior History:   Underwent a chest xray in September 2022 prior to a discectomy and that showed a lung mass.      She then underwent CT chest also on September 2022 which showed spiculated mass in anterior segment GRACIELA, multiple borderline/enlarged mediastinal lymph nodes.     PFT's on 11/5/21 revealed FVC 1.96 (61%), FEV1 1.47 (60%), TLC 73%, DLCO 9.18 (47%)     PET/CT on 11/11/2022 reveals  FDG avid, spiculated pleural based mass in the GRACIELA extending into the anterior mediastinum, measuring 3.5cm. SUV 8.5. no enlarged or FDG avid lymphadenopathy in chest. No distant mets but FDG avid L parotid lesion     Navigational bronch on 12/13/2021 revealed "no endobronchial disease medial GRACIELA sampled. EBUS for station 4L, 7, 11. Path: GRACIELA suspicious but not diagnostic for malignancy. 4L, 7, 11L negative for malignancy"     On 1/25/22: he met with CT surgery and is felt not to be a candidate for resection due to invasion of mediastinum.     MRI brain on 2/12/2022 revealed  no intracranial mets     CT Guided biopsy on 2/24/2022 reveals  of GRACIELA demonstrating squamous cell carcinoma. Left parotid gland demonstrating pleomorphic adenomal     2/28/2022 CT revealed "56 x 39 x 15 mm spiculated pleural-based left upper lobe mass (compatible with biopsy proven diagnosis of squamous cell " "carcinoma) which abuts and invades the anterior mediastinum with compression and/or invasion of the brachial cephalic vein and intimate association with the left internal mammary vessels and anterior margin of the thoracic aortic arch. Scattered solid pulmonary nodules measuring up to 3 mm in size for which short-term imaging follow-up is recommended. Centrilobular emphysematous lung architecture.  21 x 22 mm hypodense area within the medial segment of the left hepatic lobe abutting the interlobar fissure.  While it is possible that this represents focal fatty infiltration, an underlying solitary liver lesion or focus of metastatic disease cannot be excluded.  The patient's recent PET-CT scan showed no abnormal FDG hypermetabolism in this vicinity.  Consider additional investigation with contrast enhanced MRI of the liver. Stable 26 x 39 mm left adnexal hypodensity which show no FDG hypermetabolism at the time of the patient's recent PET-CT scan. Tiny fat containing umbilical hernia. 12 mm short axis right paratracheal lymph node which showed no FDG hypermetabolism at the time of the patient's recent PET-CT scan and which remains unchanged in size since that study"    3/3/2022 MR abd reveals "Hypoattenuating area on prior CT near the falciform ligament corresponds to an area of focal fatty infiltration.  No suspicious liver lesions are present"         She completed definitive chemo/RT with weekly Carboplatin and Taxol (4/14/2022) and completed Radiation on 4/26/2022    5/10/2022 CT scans "Interval decrease in the size of the left upper lobe tumor and precarinal mediastinal lymph node. Interval decrease in the extent of the consolidation at the base of the left lower lobe. Chronic findings of pulmonary emphysema and bilateral renal cysts"    She started consolidation Imfinzi on 5/12/2022 1/27/2023 CT scans reveal "Interval enlargement of the anterior mediastinal mass in size with development of an air-filled " "cystic cavitary component.  This most likely relates to fistulization with the adjacent lung.  Air forming organism an abscess would seem less likely but difficult to exclude.  No obvious fluid collection is noted Several small pulmonary nodules/regions of consolidation near the lung apices bilaterally favored to be new since the prior with a reticulonodular appearance.  Infectious and inflammatory as well as neoplastic etiologies is on differential.  Close follow-up for stability or resolution is necessary. Renal hypodensities without worrisome change since the prior statistically favored relate to cysts. Left ovarian hypodensity is stable since the prior of uncertain etiology.  Correlation with female pelvis ultrasound in female pelvis ultrasound follow-up for size stability would be reasonable. Stable precarinal mediastinal node"    CT guided biopsy reveals squamous cell.      Oncology History   Cancer of upper lobe of left lung   2/17/2022 Initial Diagnosis    Cancer of upper lobe of left lung     2/17/2022 Cancer Staged    Staging form: Lung, AJCC 8th Edition  - Clinical stage from 2/17/2022: Stage IIIA (cT4, cN0, cM0)        Radiation Therapy    Treating physician: Sidney Sanders      Treatment Summary  Course: C1 Chest 2022  Treatment Site Ref. ID Energy Dose/Fx (Gy) #Fx Dose Correction (Gy) Total Dose (Gy) Start Date End Date Elapsed Days   IM Lung Lung 6X 2 30 / 30 0 60 3/10/2022 4/26/2022 47      3/20/2023 -  Chemotherapy    Treatment Summary   Plan Name: OP NSCLC PACLITAXEL CARBOPLATIN Q3W  Treatment Goal: Palliative  Status: Active  Start Date: 3/20/2023  End Date: 7/3/2023 (Planned)  Provider: Mere Styles MD  Chemotherapy: CARBOplatin (PARAPLATIN) 360 mg in sodium chloride 0.9% 321 mL chemo infusion, 360 mg (100 % of original dose 357.5 mg), Intravenous, Clinic/HOD 1 time, 1 of 6 cycles  Dose modification:   (original dose 357.5 mg, Cycle 1),   (Cycle 2),   (Cycle 5)  Administration: 360 mg " (3/20/2023)  PACLitaxeL (TAXOL) 175 mg/m2 = 306 mg in sodium chloride 0.9% 500 mL chemo infusion, 175 mg/m2 = 306 mg (100 % of original dose 175 mg/m2), Intravenous, Clinic/HOD 1 time, 1 of 6 cycles  Dose modification: 175 mg/m2 (original dose 175 mg/m2, Cycle 1, Reason: Other (see comments))  Administration: 306 mg (3/20/2023)       Malignant neoplasm of upper lobe of left lung   2/17/2022 Initial Diagnosis    Malignant neoplasm of upper lobe of left lung     3/10/2022 - 4/14/2022 Chemotherapy    Treatment Summary   Plan Name: OP NSCLC PACLITAXEL + CARBOPLATIN (AUC) QW + RADIATION  Treatment Goal: Curative  Status: Inactive  Start Date: 3/10/2022  End Date: 4/14/2022  Provider: Mere Styles MD  Chemotherapy: CARBOplatin (PARAPLATIN) 140 mg in sodium chloride 0.9% 250 mL chemo infusion, 140 mg (100 % of original dose 139.8 mg), Intravenous, Clinic/HOD 1 time, 6 of 6 cycles  Dose modification:   (original dose 139.8 mg, Cycle 1),   (original dose 137.2 mg, Cycle 2),   (original dose 137.2 mg, Cycle 3),   (original dose 137.2 mg, Cycle 4),   (original dose 115.4 mg, Cycle 5),   (original dose 145.2 mg, Cycle 6)  Administration: 140 mg (3/10/2022), 135 mg (3/17/2022), 135 mg (3/25/2022), 135 mg (3/31/2022), 115 mg (4/7/2022), 145 mg (4/14/2022)  PACLitaxeL (TAXOL) 45 mg/m2 = 78 mg in sodium chloride 0.9% 250 mL chemo infusion, 45 mg/m2 = 78 mg, Intravenous, Clinic/HOD 1 time, 6 of 6 cycles  Dose modification: 45 mg/m2 (original dose 45 mg/m2, Cycle 4)  Administration: 78 mg (3/10/2022), 78 mg (3/17/2022), 78 mg (3/25/2022), 78 mg (3/31/2022), 78 mg (4/7/2022), 78 mg (4/14/2022)       5/12/2022 - 2/9/2023 Chemotherapy    Treatment Summary   Plan Name: OP DURVALUMAB 1500 MG Q4W  Treatment Goal: Palliative  Status: Inactive  Start Date: 5/12/2022  End Date: 2/9/2023  Provider: Mere Styles MD  Chemotherapy: [No matching medication found in this treatment plan]        Radiation Therapy    Treating physician: Sidney  Tommy      Treatment Summary  Course: C1 Chest 2022  Treatment Site Ref. ID Energy Dose/Fx (Gy) #Fx Dose Correction (Gy) Total Dose (Gy) Start Date End Date Elapsed Days   IM Lung Lung 6X 2 30 / 30 0 60 3/10/2022 4/26/2022 47      3/20/2023 -  Chemotherapy    Treatment Summary   Plan Name: OP NSCLC PACLITAXEL CARBOPLATIN Q3W  Treatment Goal: Palliative  Status: Active  Start Date: 3/20/2023  End Date: 7/3/2023 (Planned)  Provider: Mere Styles MD  Chemotherapy: CARBOplatin (PARAPLATIN) 360 mg in sodium chloride 0.9% 321 mL chemo infusion, 360 mg (100 % of original dose 357.5 mg), Intravenous, Clinic/HOD 1 time, 1 of 6 cycles  Dose modification:   (original dose 357.5 mg, Cycle 1),   (Cycle 2),   (Cycle 5)  Administration: 360 mg (3/20/2023)  PACLitaxeL (TAXOL) 175 mg/m2 = 306 mg in sodium chloride 0.9% 500 mL chemo infusion, 175 mg/m2 = 306 mg (100 % of original dose 175 mg/m2), Intravenous, Clinic/HOD 1 time, 1 of 6 cycles  Dose modification: 175 mg/m2 (original dose 175 mg/m2, Cycle 1, Reason: Other (see comments))  Administration: 306 mg (3/20/2023)          Past Medical History:   Past Medical History:   Diagnosis Date    Acute kidney injury     Cervical radiculopathy     Cigarette smoker 1/26/2022    Diabetes mellitus     Hypertension     Mass of upper lobe of left lung 1/26/2022    Neuropathy     Rhabdomyolysis        Past Surgical HIstory:   Past Surgical History:   Procedure Laterality Date    BILATERAL TUBAL LIGATION      ENDOBRONCHIAL ULTRASOUND N/A 12/13/2021    Procedure: ENDOBRONCHIAL ULTRASOUN;  Surgeon: William Padgett Jr., DO;  Location: [facility];  Service: Pulmonary;  Laterality: N/A;    HYSTERECTOMY      INSERTION OF TUNNELED CENTRAL VENOUS CATHETER (CVC) WITH SUBCUTANEOUS PORT N/A 3/9/2022    Procedure: [id number];  Surgeon: Jos Solitario MD;  Location: [facility] OR;  Service: General;  Laterality: N/A;    NAVIGATIONAL BRONCHOSCOPY Left 12/13/2021    Procedure: BRONCHOSCOPY,  NAVIGATIONAL;  Surgeon: William Padgett Jr., ;  Location: [facility];  Service: Pulmonary;  Laterality: Left;       Family History:   Family History   Problem Relation Age of Onset    Valvular heart disease Mother        Social History:  reports that she has been smoking cigarettes. She has been smoking an average of .5 packs per day. She has never used smokeless tobacco. She reports that she does not drink alcohol.    Allergies:  Review of patient's allergies indicates:   Allergen Reactions    Codeine Hives and Other (See Comments)     Other reaction(s): Unknown  Other reaction(s): Unknown      Ibuprofen Swelling    Morphine Diarrhea and Other (See Comments)     Other reaction(s): Renal Dysfunctions  Other reaction(s): Renal Dysfunctions         Medications:  Current Outpatient Medications   Medication Sig Dispense Refill    albuterol (PROVENTIL/VENTOLIN HFA) 90 mcg/actuation inhaler Inhale 2 puffs into the lungs every 6 (six) hours as needed for Wheezing or Shortness of Breath. Rescue 1 g 5    BREO ELLIPTA 100-25 mcg/dose diskus inhaler Inhale 2 puffs into the lungs once daily.      BREO ELLIPTA 100-25 mcg/dose diskus inhaler Inhale 1 puff into the lungs.      dexAMETHasone (DECADRON) 6 MG tablet Take 2 tablets daily for 3 days with breakfast, starting the day after chemo. 40 tablet 0    diclofenac (VOLTAREN) 75 MG EC tablet Take 75 mg by mouth 2 (two) times daily.      duke's soln (benadryl 30 mL, mylanta 30 mL, LIDOcaine 30 mL, nystatin 30 mL) 120mL Take 10 mLs by mouth 4 (four) times daily. 120 mL 1    empagliflozin (JARDIANCE) 25 mg tablet Take 1 tablet by mouth once daily.      FLUoxetine 20 MG capsule TAKE TWO CAPSULES BY MOUTH EVERY MORNING 30 capsule 2    FLUoxetine 40 MG capsule Take 1 capsule (40 mg total) by mouth once daily. 30 capsule 11    gabapentin (NEURONTIN) 800 MG tablet Take 1 tablet (800 mg total) by mouth 3 (three) times daily. 90 tablet 0    LIDOcaine-prilocaine (EMLA) cream Apply topically  as needed (Prior to port access). 30 g 0    lovastatin (MEVACOR) 10 MG tablet Take 10 mg by mouth nightly.      metFORMIN (GLUCOPHAGE) 1000 MG tablet Take 1,000 mg by mouth 2 (two) times daily.      methadone (DOLOPHINE) 10 MG tablet Take 1.5 tablets (15 mg total) by mouth 3 (three) times daily. 135 tablet 0    mupirocin (BACTROBAN) 2 % ointment       naloxone (NARCAN) 4 mg/actuation Spry 4mg by nasal route as needed for opioid overdose; may repeat every 2-3 minutes in alternating nostrils until medical help arrives. Call 911 1 each 11    nystatin (MYCOSTATIN) 100,000 unit/mL suspension Take 6 mLs (600,000 Units total) by mouth 4 (four) times daily. for 10 days 240 mL 0    ondansetron (ZOFRAN) 8 MG tablet Take 1 tablet (8 mg total) by mouth every 8 (eight) hours as needed for Nausea. 60 tablet 2    oxyCODONE (ROXICODONE) 30 MG Tab Take 1 tablet (30 mg total) by mouth every 4 (four) hours as needed. 180 tablet 0    polyethylene glycol (GLYCOLAX) 17 gram PwPk Take 17 g by mouth once daily. 100 each 0    prochlorperazine (COMPAZINE) 5 MG tablet Take 1 tablet (5 mg total) by mouth every 6 (six) hours as needed for Nausea. 60 tablet 2    promethazine (PHENERGAN) 12.5 MG Tab Take 1 tablet (12.5 mg total) by mouth every 6 (six) hours as needed (N/V). 30 tablet 0    SENNA 8.6 mg tablet Take 2 tablets by mouth 2 (two) times daily. 90 tablet 3     No current facility-administered medications for this visit.       Review of Systems   Constitutional:  Positive for activity change, appetite change, fatigue and unexpected weight change. Negative for chills and fever.   HENT:  Negative for sore throat and trouble swallowing.    Respiratory:  Positive for shortness of breath. Negative for cough.    Cardiovascular:  Negative for chest pain, palpitations and leg swelling.   Gastrointestinal:  Positive for nausea and vomiting. Negative for abdominal pain, constipation and diarrhea.   Genitourinary:  Negative for dysuria and hematuria.  "  Musculoskeletal:  Negative for myalgias.   Skin:  Negative for rash.   Neurological:  Negative for light-headedness, numbness and headaches.   Psychiatric/Behavioral:  The patient is not nervous/anxious.      ECOG Performance Status:   ECOG SCORE             Objective:      Vitals:   Vitals:    03/28/23 1319   BP: 118/88   BP Location: Left arm   Patient Position: Sitting   BP Method: Medium (Manual)   Pulse: 97   Resp: 20   Temp: 97.1 °F (36.2 °C)   TempSrc: Temporal   SpO2: 97%   Weight: 56.7 kg (125 lb)   Height: 5' 8" (1.727 m)     BMI: Body mass index is 19.01 kg/m².    Physical Exam  Vitals and nursing note reviewed.   Constitutional:       General: She is not in acute distress.     Appearance: She is not diaphoretic.   HENT:      Head: Normocephalic and atraumatic.      Mouth/Throat:      Comments: White patches/exudate covering tongue and left buccal mucosa   Eyes:      General: No scleral icterus.  Cardiovascular:      Rate and Rhythm: Regular rhythm. Tachycardia present.      Heart sounds: Normal heart sounds. No murmur heard.  Pulmonary:      Effort: Pulmonary effort is normal. No respiratory distress.      Breath sounds: No rhonchi.   Abdominal:      General: Bowel sounds are normal. There is no distension.      Tenderness: There is no abdominal tenderness. There is no guarding.   Musculoskeletal:      Right lower leg: No edema.      Left lower leg: No edema.   Skin:     General: Skin is warm.      Coloration: Skin is not jaundiced.      Findings: No bruising or rash.   Neurological:      Mental Status: She is alert and oriented to person, place, and time.   Psychiatric:         Behavior: Behavior normal.       Laboratory Data:  Lab Results   Component Value Date    WBC 12.95 (H) 03/28/2023    HGB 14.9 03/28/2023    HCT 46.9 03/28/2023    MCV 86 03/28/2023     03/28/2023        Imaging:   Assessment:       1. Malignant neoplasm of upper lobe of left lung    2. Oral candidiasis    3. Type 2 " diabetes mellitus without complication, without long-term current use of insulin    4. Nausea and vomiting, unspecified vomiting type    5. Dehydration    6. Immunodeficiency due to drugs    7. Leukocytosis, unspecified type           Plan:   Malignant neoplasm of the lung, left  -Started Carbo/Taxol for progressive NSCLC.   -C1D1 was on 3/20/23  -Patient with significant nausea, weakness after C1  -IV fluids, IV ondansetron given 3/27/23  -Patient to get an appointment with PCP regarding elevated blood sugars, explained may need to hold off on Dex next cycle  -TSH and T4 pending today   -Keep follow up with Dr. Styles as planned next week     Oral Candidiasis  -Prescription for Nystatin solution to use 4 x daily sent to pharmacy  -Patient encouraged to use "[employer] for discomfort as well  -Monitor     Type 2 diabetes  -Taking Jardiance   -BS 3/27/23 is 593, hyperglycemia is likely related to use of Dex a x 4 days   -Given IVF  -recheck BS prior to leaving 3/27 was 536, patient advised to go to ER   -Patient to schedule appointment with PCP prior to next cycle to discuss coverage for days she takes steroids with tx  -2/28/23 BS is 241, improved  -Continue to hydrate, check blood sugars at home.     Nausea and vomiting  Dehydration   -IV Zofran and IVF 1 L NS 3/27/23  -Advised to be more aggressive with antiemetics at home   -Continue to push PO fluids, small meals throughout day    Hyponatremia   -Sodium is 133 today, improved from 130 3/27/23  -Continue to supplement fluids with electrolyte drinks, not just free water  -Monitor     Leukocytosis   -Patient took Dex 12 mg x 4 days with the last dose 3/26  -UA/micro with no bacteria or yeast  -WBC down to 12.9 today, improved     Assessment/Plan reviewed and approved by Dr. Styles   Patient encouraged to seek attention at the nearest ER for any changes in mentation, LOC, fevers, uncontrolled BS  30 minutes were spent in coordination of patient's care, record review and  counseling.    Route Chart for Scheduling    Med Onc Chart Routing      Follow up with physician Other. Keep appointments with Dr. Styles next week as planned   Follow up with CLARISSE    Infusion scheduling note    Injection scheduling note    Labs    Imaging    Pharmacy appointment    Other referrals           Treatment Plan Information   OP NSCLC PACLITAXEL CARBOPLATIN Q3W   Mere Styles MD   Upcoming Treatment Dates - OP NSCLC PACLITAXEL CARBOPLATIN Q3W    4/10/2023       Pre-Medications       diphenhydrAMINE (BENADRYL) 50 mg in NS 50 mL IVPB       famotidine (PF) injection 20 mg       Chemotherapy       PACLitaxeL (TAXOL) 175 mg/m2 = 306 mg in sodium chloride 0.9% 500 mL chemo infusion       CARBOplatin (PARAPLATIN) in sodium chloride 0.9% 250 mL chemo infusion       Antiemetics       aprepitant (CINVANTI) injection 130 mg       palonosetron 0.25mg/dexAMETHasone 20mg in NS IVPB 0.25 mg 50 mL  5/1/2023       Pre-Medications       diphenhydrAMINE (BENADRYL) 50 mg in NS 50 mL IVPB       famotidine (PF) injection 20 mg       Chemotherapy       PACLitaxeL (TAXOL) 175 mg/m2 = 306 mg in sodium chloride 0.9% 500 mL chemo infusion       CARBOplatin (PARAPLATIN) in sodium chloride 0.9% 250 mL chemo infusion       Antiemetics       aprepitant (CINVANTI) injection 130 mg       palonosetron 0.25mg/dexAMETHasone 20mg in NS IVPB 0.25 mg 50 mL  5/22/2023       Pre-Medications       diphenhydrAMINE (BENADRYL) 50 mg in NS 50 mL IVPB       famotidine (PF) injection 20 mg       Chemotherapy       PACLitaxeL (TAXOL) 175 mg/m2 = 306 mg in sodium chloride 0.9% 500 mL chemo infusion       CARBOplatin (PARAPLATIN) in sodium chloride 0.9% 250 mL chemo infusion       Antiemetics       aprepitant (CINVANTI) injection 130 mg       palonosetron 0.25mg/dexAMETHasone 20mg in NS IVPB 0.25 mg 50 mL  6/12/2023       Pre-Medications       diphenhydrAMINE (BENADRYL) 50 mg in NS 50 mL IVPB       famotidine (PF) injection 20 mg       Chemotherapy        PACLitaxeL (TAXOL) 175 mg/m2 = 306 mg in sodium chloride 0.9% 500 mL chemo infusion       CARBOplatin (PARAPLATIN) in sodium chloride 0.9% 250 mL chemo infusion       Antiemetics       aprepitant (CINVANTI) injection 130 mg       palonosetron 0.25mg/dexAMETHasone 20mg in NS IVPB 0.25 mg 50 mL    Therapy Plan Information  IV Fluids  sodium chloride 0.9% bolus 1,000 mL 1,000 mL  1,000 mL, Intravenous, Every visit  5. Medications  ondansetron injection 4 mg  4 mg, Intravenous, Every visit  Flushes  sodium chloride 0.9% flush 10 mL  10 mL, Intravenous, PRN  heparin, porcine (PF) 100 unit/mL injection flush 500 Units  500 Units, Intravenous, PRN

## 2023-03-28 NOTE — TELEPHONE ENCOUNTER
"Called [employer] regarding the denial for phenergan. Natept gave me a Case No: [id number] and states,"it will take 72 hours for appeal. I verbalized understanding.  "

## 2023-03-28 NOTE — PROGRESS NOTES
Late entry from March 27, 2023:  7:20 AM      This LCSW met with this pt briefly to check in while she was receiving infusion. The pt was accompanied by her relative who reported the pt doing well but just really tired. The pt was very sleepy and was not able to communicate much at this time. She reported no needs at this time.

## 2023-03-29 NOTE — TELEPHONE ENCOUNTER
----- Message from Sruthi Wilson sent at 3/29/2023  3:49 PM CDT -----  Type:Needs Medical Advice    Who Called: Parveen donohue  Best call back number:[phone] opt 3 case# [id number]  Additional Info: Requesting a call back regarding PA needed For medication (Promethazine)  Please Advise- Thank you    Set to  soon..  being Faxed as well

## 2023-03-29 NOTE — TELEPHONE ENCOUNTER
Spoke with Marika WOOTEN regarding approval for promethazine hcl tablet approved. PA # [id number].                                                                                                                                                                                                                                                                                                                                                                                                                                                                                                 
(2) assistive person

## 2023-03-30 PROBLEM — E11.10 DKA (DIABETIC KETOACIDOSIS): Status: ACTIVE | Noted: 2023-01-01

## 2023-03-30 PROBLEM — Z71.89 ACP (ADVANCE CARE PLANNING): Status: ACTIVE | Noted: 2023-01-01

## 2023-03-30 NOTE — TELEPHONE ENCOUNTER
Spoke with the patient to schedule an IO consult and she said she was in ER. I told her I will call her again in a few days.

## 2023-04-06 NOTE — TELEPHONE ENCOUNTER
Spoke with daughter and she stated that they are canceling md and tx. Pt verbalized and understanding.  ----- Message from Sruthi Wilson sent at 4/6/2023  9:05 AM CDT -----  Regarding: Dr Styles  Type:Needs Medical Advice    Who Called:PT  Best call back number:[phone]  Additional Info: Requesting a call back regarding PT wishes to cancel her appt with Dr Styles scheduled for today. She stated she will call when she is ready to reschedule.  Please Advise- Thank you

## 2023-04-12 NOTE — PROGRESS NOTES
Office Visit  [facility]      Consult Requested By: Dr. Mere Styles  Reason for Consult: cancer related pain      ASSESSMENT/PLAN:     Plan/Recommendations:  Teresa was seen today for cancer of upper lobe of left lung.    Diagnoses and all orders for this visit:    Malignant neoplasm of upper lobe of left lung  Continue to follow with Oncology- currently doing chemo    Palliative care by specialist  Patient reminded of Palliative Care role, we are supportive care for patients with serious illness  She must follow up monthly for prescription refills and sign Opioid contract today  High risk ORT score, UDS on follow up  Depression stable on Prozac- encouraged to engage with Psychologist for therapy     Cancer associated pain  -     SCHEDULED EKG 12-LEAD (to Muse); Future  - cont methadone 15 mg TID; fill date 3/27/2023  - continue oxycodone IR 30 mg PO q4h PRN breakthrough pain, fill date 4/12/2-23  - continue senna 2 tabs po bid + miralax daily  - continue gabapentin 800 mg PO TID  - per Palliative care notes - last QTc was 430 ms in October 2022    ACP  Patient has completed HCPOA, Living Will provided for review     Follow up: 6 weeks      SUBJECTIVE:     History of Present Illness:  Patient is a 72 y.o. year old female presenting with h/o   lung cancer in left upper lobe and adjacent mediastinum. She is under management of Dr Styles- oncology  and [facility] on [place] (Dr Shah)  for her cancer related pain. She is here today to transfer care closer to home, she lives in [place] and has had difficulty getting to appointments in [place].     Patient currently undergoing chemotherapy, and reports recent hospital admission for hyperglycemia related to steroids. Patient is unsure if she wants to continue treatment at this time. She does have h/o DM2      Palliative Discussion/ACP  Patient lives with her significant other and daughter, she has 2 other children out of state.  She has completed HCPOA to reflect one of her daughters. Discussed her wishes and goals for her care. Patient states she is unsure if she wants to continue treatment, she does not like side effects, she prefers to have quality of life. We discussed Hospice as option for care moving forward, she is not quite ready to engage.   Asked patient her wishes with respect to resuscitation and ventilation, she is unsure, provided Living Will for review and further discussion with family.   Spent total of 25 minuets engaging patient in this ACP discussion    Pain  Pain score 5/10 today, pain is to left side of chest   Pain decreases to to 2-3 /10 with current regiment- methadone, oxycodone and gabapentin  Will continue for now.     Appetite  -overall stable, no excessive weight gain or loss  -currently not needing appetite stimulant     Sleep  -normal sleep pattern     Mood  - feels down related to body imag in particular hair loss , she is unsure if she wants to continue chemo   - Currently on Prozac for depression    Denies SI/HI      Bowel Movement  - takes senna daily, BM every 3 days  will increase     Urination  -able to have normal urination      ROS:  Review of Systems   Cardiovascular:  Positive for chest pain.   Musculoskeletal:  Positive for arthralgias.   Neurological:  Positive for weakness.     Past Medical History:   Diagnosis Date    Acute kidney injury     Cervical radiculopathy     Cigarette smoker 1/26/2022    Diabetes mellitus     Hypertension     Mass of upper lobe of left lung 1/26/2022    Neuropathy     Rhabdomyolysis      Past Surgical History:   Procedure Laterality Date    BILATERAL TUBAL LIGATION      ENDOBRONCHIAL ULTRASOUND N/A 12/13/2021    Procedure: ENDOBRONCHIAL ULTRASOUN;  Surgeon: William Padgett Jr., DO;  Location: [facility];  Service: Pulmonary;  Laterality: N/A;    HYSTERECTOMY      INSERTION OF TUNNELED CENTRAL VENOUS CATHETER (CVC) WITH SUBCUTANEOUS PORT N/A 3/9/2022    Procedure:  [id number];  Surgeon: Jos Solitario MD;  Location: [facility];  Service: General;  Laterality: N/A;    NAVIGATIONAL BRONCHOSCOPY Left 12/13/2021    Procedure: BRONCHOSCOPY, NAVIGATIONAL;  Surgeon: William Padgett Jr. DO;  Location: [facility];  Service: Pulmonary;  Laterality: Left;     Family History   Problem Relation Age of Onset    Valvular heart disease Mother      Review of patient's allergies indicates:   Allergen Reactions    Codeine Hives and Other (See Comments)     Other reaction(s): Unknown  Other reaction(s): Unknown      Ibuprofen Swelling    Morphine Diarrhea and Other (See Comments)     Other reaction(s): Renal Dysfunctions  Other reaction(s): Renal Dysfunctions       Social Determinants of Health     Tobacco Use: High Risk    Smoking Tobacco Use: Every Day    Smokeless Tobacco Use: Never    Passive Exposure: Not on file   Alcohol Use: Not on file   Financial Resource Strain: Not on file   Food Insecurity: Not on file   Transportation Needs: Not on file   Physical Activity: Not on file   Stress: Not on file   Social Connections: Not on file   Housing Stability: Not on file   Depression: High Risk    Last PHQ Score: 18      The Modified Caregiver Strain Index (MCSI) -   No data recorded   No data recorded   No data recorded   No data recorded   No data recorded   No data recorded   No data recorded   No data recorded   No data recorded   No data recorded   No data recorded   No data recorded   No data recorded   No data recorded       OBJECTIVE:     Physical Exam:  Vitals: Temp: 97 °F (36.1 °C) (04/12/23 1342)  Pulse: 81 (04/12/23 1342)  Resp: 16 (04/12/23 1342)  BP: (!) 102/55 (04/12/23 1342)  SpO2: 97 % (04/12/23 1342)  Physical Exam  HENT:      Head: Normocephalic.      Mouth/Throat:      Mouth: Mucous membranes are moist.   Pulmonary:      Effort: Pulmonary effort is normal.   Abdominal:      Palpations: Abdomen is soft.      Tenderness: There is no abdominal tenderness.    Musculoskeletal:      Cervical back: Normal range of motion.   Skin:     General: Skin is warm and dry.   Neurological:      Mental Status: She is alert and oriented to person, place, and time.   Psychiatric:         Mood and Affect: Mood normal.         Review of Symptoms      Symptom Assessment (ESAS 0-10 Scale)  Pain:  0  Dyspnea:  0  Anxiety:  0  Nausea:  0  Depression:  3  Anorexia:  0  Fatigue:  0  Insomnia:  0  Restlessness:  0  Agitation:  0         Living Arrangements:  Lives with family    Psychosocial/Cultural:   See Palliative Psychosocial Note: Yes  **Primary  to Follow**  Palliative Care  Consult: No    Advance Care Planning   Advance Directives:   Medical Power of : Yes      Decision Making:  Patient answered questions and Family answered questions  Goals of Care: What is most important right now is to focus on symptom/pain control. Accordingly, we have decided that the best plan to meet the patient's goals includes continuing with treatment.          Medications:    Current Outpatient Medications:     albuterol (PROVENTIL/VENTOLIN HFA) 90 mcg/actuation inhaler, Inhale 2 puffs into the lungs every 6 (six) hours as needed for Wheezing or Shortness of Breath. Rescue, Disp: 1 g, Rfl: 5    blood sugar diagnostic Strp, To check BG 3 times daily, to use with insurance preferred meter. Please provide a meter and supplies that their insurance will cover. Thank You. E11.65, Disp: 100 strip, Rfl: 1    blood-glucose meter kit, To check BG 3 times daily, to use with insurance preferred meter. Please provide a meter and supplies that their insurance will cover. Thank You. E11.65, Disp: 1 each, Rfl: 1    BREO ELLIPTA 100-25 mcg/dose diskus inhaler, Inhale 2 puffs into the lungs once daily., Disp: , Rfl:     empagliflozin (JARDIANCE) 25 mg tablet, Take 25 mg by mouth once daily., Disp: , Rfl:     FLUoxetine 20 MG capsule, Take 20 mg by mouth every evening., Disp: , Rfl:      FLUoxetine 40 MG capsule, Take 1 capsule (40 mg total) by mouth once daily., Disp: 30 capsule, Rfl: 11    gabapentin (NEURONTIN) 800 MG tablet, Take 1 tablet (800 mg total) by mouth 3 (three) times daily., Disp: 90 tablet, Rfl: 0    lancets Misc, To check BG 3 times daily, to use with insurance preferred meter.Please provide a meter and supplies that their insurance will cover. Thank You. E11.65, Disp: 100 each, Rfl: 1    lovastatin (MEVACOR) 10 MG tablet, Take 10 mg by mouth nightly., Disp: , Rfl:     methadone (DOLOPHINE) 10 MG tablet, Take 1.5 tablets (15 mg total) by mouth 3 (three) times daily., Disp: 135 tablet, Rfl: 0    naloxone (NARCAN) 4 mg/actuation Spry, 4mg by nasal route as needed for opioid overdose; may repeat every 2-3 minutes in alternating nostrils until medical help arrives. Call 911 (Patient taking differently: 1 spray by Nasal route once as needed (for opioid overdose; may repeat every 2-3 minutes in alternating nostrils until medical help arrives. Call 911).), Disp: 1 each, Rfl: 11    ondansetron (ZOFRAN) 8 MG tablet, Take 1 tablet (8 mg total) by mouth every 8 (eight) hours as needed for Nausea., Disp: 60 tablet, Rfl: 2    oxyCODONE (ROXICODONE) 30 MG Tab, Take 1 tablet (30 mg total) by mouth every 4 (four) hours as needed., Disp: 180 tablet, Rfl: 0    prochlorperazine (COMPAZINE) 5 MG tablet, Take 1 tablet (5 mg total) by mouth every 6 (six) hours as needed for Nausea., Disp: 60 tablet, Rfl: 2    promethazine (PHENERGAN) 12.5 MG Tab, Take 12.5 mg by mouth every 6 (six) hours as needed (nausea/vomiting)., Disp: , Rfl:     promethazine (PHENERGAN) 25 MG tablet, Take 25 mg by mouth every 6 (six) hours as needed (nausea/vomiting)., Disp: , Rfl:     Labs:  CBC:   WBC   Date Value Ref Range Status   04/01/2023 5.55 3.90 - 12.70 K/uL Final     Hemoglobin   Date Value Ref Range Status   04/01/2023 10.4 (L) 12.0 - 16.0 g/dL Final     Hematocrit   Date Value Ref Range Status   04/01/2023 32.6 (L) 37.0  - 48.5 % Final     MCV   Date Value Ref Range Status   04/01/2023 86 82 - 98 fL Final     Platelets   Date Value Ref Range Status   04/01/2023 175 150 - 450 K/uL Final       LFT:   Lab Results   Component Value Date    AST 19 04/11/2023    ALKPHOS 101 04/11/2023    BILITOT 0.1 (L) 04/11/2023       Albumin:   Albumin   Date Value Ref Range Status   04/11/2023 3.4 (L) 3.5 - 5.2 g/dL Final     Protein:   Total Protein   Date Value Ref Range Status   04/11/2023 6.2 6.0 - 8.4 g/dL Final       Radiology:None      60 minutes of total time spent on the encounter, which includes face to face time and non-face to face time preparing to see the patient (eg, review of tests), Obtaining and/or reviewing separately obtained history, Documenting clinical information in the electronic or other health record, Independently interpreting results if documented above (not separately reported) and communicating results to the patient/family/caregiver, or Care coordination (not separately reported).    25 minutes spent in discussing ACP    Signature: Anais Chatterjee NP

## 2023-04-14 NOTE — PATIENT INSTRUCTIONS
Continue methadone 15 mg every 8 hours  - continue oxycodone IR 30 mg  every 4 hours as needed for breakthrough pain  - continue senna 2 tabs  twice daily and  miralax daily for bowels  - continue gabapentin 800 mg three times daily

## 2023-04-26 NOTE — TELEPHONE ENCOUNTER
----- Message from Torri Boyer sent at 4/26/2023 12:12 PM CDT -----  Type: Need Medical Advice   Who Called: daughter of patient   [facility] callback number: [phone]  Additional Information: Patient daughter called stating she will need the infusion appointment to be on 5/2 with labs and appointment with Stephani. She is a teacher and can only take one day off to bring her mom to her appointment   Please call to further assist, Thanks

## 2023-04-26 NOTE — TELEPHONE ENCOUNTER
----- Message from Salima Trinidad sent at 4/26/2023  1:46 PM CDT -----  Patient daughter would like a call to marina all patient appts on one day. Patient infusion is 300 minutes so I wouldn't be able to follow her MD appt on 5/2.

## 2023-05-11 NOTE — TELEPHONE ENCOUNTER
----- Message from Ceasar Fernández sent at 5/11/2023  1:58 PM CDT -----  Contact: Self  Type: Needs Medical Advice  Who Called:  Patient  Best Call Back Number: [phone]  Additional Information: States she forgot about appt. Would like to speak with office to see if she can come later or r/s

## 2023-05-18 NOTE — PROGRESS NOTES
"Subjective     Patient ID: Teresa Earl is a 72 y.o. female.    Chief Complaint: Malignant neoplasm of upper lobe of left lung (Follow up with scan/labs)  Mrs. Earl is a 72 year old female who underwent a chest xray in September 2022 prior to a discectomy and that showed a lung mass.      She then underwent CT chest also on September 2022 which showed spiculated mass in anterior segment GRACIELA, multiple borderline/enlarged mediastinal lymph nodes.     PFT's on 11/5/21 revealed FVC 1.96 (61%), FEV1 1.47 (60%), TLC 73%, DLCO 9.18 (47%)     PET/CT on 11/11/2022 reveals  FDG avid, spiculated pleural based mass in the GRACIELA extending into the anterior mediastinum, measuring 3.5cm. SUV 8.5. no enlarged or FDG avid lymphadenopathy in chest. No distant mets but FDG avid L parotid lesion     Navigational bronch on 12/13/2021 revealed "no endobronchial disease medial GRACIELA sampled. EBUS for station 4L, 7, 11. Path: GRACIELA suspicious but not diagnostic for malignancy. 4L, 7, 11L negative for malignancy"     On 1/25/22: he met with CT surgery and is felt not to be a candidate for resection due to invasion of mediastinum.     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 Scattered solid pulmonary nodules measuring up to 3 mm in size for which short-term imaging follow-up is recommended. Centrilobular emphysematous lung architecture.  21 x 22 mm hypodense area within the medial segment of the left " "hepatic lobe abutting the interlobar fissure.  While it is possible that this represents focal fatty infiltration, an underlying solitary liver lesion or focus of metastatic disease cannot be excluded.  The patient's recent PET-CT scan showed no abnormal FDG hypermetabolism in this vicinity.  Consider additional investigation with contrast enhanced MRI of the liver. Stable 26 x 39 mm left adnexal hypodensity which show no FDG hypermetabolism at the time of the patient's recent PET-CT scan. Tiny fat containing umbilical hernia. 12 mm short axis right paratracheal lymph node which showed no FDG hypermetabolism at the time of the patient's recent PET-CT scan and which remains unchanged in size since that study"  Follow-up MRI abdomen on 3/3/2022 reveals "Hypoattenuating area on prior CT near the falciform ligament corresponds to an area of focal fatty infiltration.  No suspicious liver lesions are present"          She completed definitive chemo/RT with weekly Carboplatin and Taxol (4/14/2022) and completed Radiation on 4/26/2022  CT scans from 5/10/2022 "Interval decrease in the size of the left upper lobe tumor and precarinal mediastinal lymph node. Interval decrease in the extent of the consolidation at the base of the left lower lobe. Chronic findings of pulmonary emphysema and bilateral renal cysts"  She started consolidation Imfinzi on 5/12/2022                    CT scans from 1/27/2023 reveals "Interval enlargement of the anterior mediastinal mass in size with development of an air-filled cystic cavitary component.  This most likely relates to fistulization with the adjacent lung.  Air forming organism an abscess would seem less likely but difficult to exclude.  No obvious fluid collection is noted Several small pulmonary nodules/regions of consolidation near the lung apices bilaterally favored to be new since the prior with a reticulonodular appearance.  Infectious and inflammatory as well as neoplastic " "etiologies is on differential.  Close follow-up for stability or resolution is necessary. Renal hypodensities without worrisome change since the prior statistically favored relate to cysts. Left ovarian hypodensity is stable since the prior of uncertain etiology.  Correlation with female pelvis ultrasound in female pelvis ultrasound follow-up for size stability would be reasonable. Stable precarinal mediastinal node"  CT guided biopsy reveals squamous cell.      HPIFrancie received cycle 1 of Carboplatin and Taxol on  3/20/2023 and then had nausea, vomiting and then DKA, it has taken her over 2 months to even slightly improve  CT scans from 5/9/2023 reveal "38 x 46 x 44 mm left upper lobe cavitary mass which is intimately associated with and likely invades the anterior mediastinum an possibly involves the left brachiocephalic vein, left internal mammary vessels, and left anterior paramediastinal chest wall including the left 1st sternocostal joint, slightly larger as compared to the previous examination.  No new pulmonary nodules appreciated on today's examination.  No evidence of new metastatic disease elsewhere in the chest, abdomen, or pelvis. Stable right paratracheal lymph node. Band like atelectasis versus fibrosis at the left lung base with associated elevation of the left hemidiaphragm/volume loss in the left chest. Emphysematous lung architecture. Stable 35 x 26 mm left adnexal hypodensity. Multiple bilateral renal hypodensities, unchanged and most likely representative of 6.17 x 11 mm hypodensity within the left hepatic lobe abutting the falciform ligament, unchanged.  No new enhancing hepatic mass"    She is here with her daughter to discuss further management     Review of Systems   Constitutional:  Positive for activity change, appetite change and fatigue.   Respiratory:  Positive for cough, chest tightness and shortness of breath.    Neurological:  Positive for weakness and light-headedness.      "   Objective     Physical Exam      LABS:  WBC   Date Value Ref Range Status   05/09/2023 12.03 3.90 - 12.70 K/uL Final     Hemoglobin   Date Value Ref Range Status   05/09/2023 11.6 (L) 12.0 - 16.0 g/dL Final     Hematocrit   Date Value Ref Range Status   05/09/2023 37.1 37.0 - 48.5 % Final     Platelets   Date Value Ref Range Status   05/09/2023 260 150 - 450 K/uL Final     Gran # (ANC)   Date Value Ref Range Status   05/09/2023 9.7 (H) 1.8 - 7.7 K/uL Final     Gran %   Date Value Ref Range Status   05/09/2023 80.5 (H) 38.0 - 73.0 % Final          Assessment and Plan     Problem List Items Addressed This Visit       Cancer of upper lobe of left lung - Primary    Type 2 diabetes mellitus without complication         Route Chart for Scheduling  Med Onc Route Chart for Scheduling      Therapy Plan Information  IV Fluids  sodium chloride 0.9% bolus 1,000 mL 1,000 mL  1,000 mL, Intravenous, Every visit  5. Medications  ondansetron injection 4 mg  4 mg, Intravenous, Every visit  Flushes  sodium chloride 0.9% flush 10 mL  10 mL, Intravenous, PRN  heparin, porcine (PF) 100 unit/mL injection flush 500 Units  500 Units, Intravenous, PRN    Reviewed the clinical course over the last 2 months since she received Carboplatin and Taxol.  She understands that she has not tolerated her chemo and understands that this is not the right choice for her. Her accompanying daughter Li and another daughter on the phone agree as they have seen their mom very weak and unable to do anything in the last 2 months  We discussed goals of care and palliation. She is agreeable to stopping chemo as she understands that chemo is causing her more harm than good.   She will continue with palliative care. Sent message to Anais Chatterjee    .Above care plan was discussed with patient and accompanying daughter and all questions were addressed to their satisfaction

## 2023-05-18 NOTE — Clinical Note
Dani Manzo, We are stopping chemo on her and switching to palliative care. Will you be able to get her back in

## 2023-06-09 NOTE — PROGRESS NOTES
Office Visit  [facility]      Consult Requested By: No ref. provider found  Reason for Consult: cancer related pain      ASSESSMENT/PLAN:     Plan/Recommendations:  Teresa was seen today for establish care.    Diagnoses and all orders for this visit:    Malignant neoplasm of upper lobe of left lung  Malignant neoplasm of lung, unspecified laterality, unspecified part of lung  Patient no longer wants to pursue treatment, she wants to focus on quality of life.  -     Ambulatory referral/consult to Hospice; Future    ACP (advance care planning)  LAPOST Completed to reflect patient's wishes of DNR, Comfort measures and no feeding tube    Insomnia, unspecified type  -     traZODone (DESYREL) 50 MG tablet; Take 1 tablet (50 mg total) by mouth every evening.    Palliative care by specialist  Depression stable on Prozac   Patient well supported by her daughter lives at home with her   Discussed transition to hospice as she does not wish to pursue treatment    Cancer associated pain  -     SCHEDULED EKG 12-LEAD (to [facility]); Future  - cont methadone 15 mg TID;   - continue oxycodone IR 30 mg PO q4h PRN breakthrough pain  - continue senna 2 tabs po bid + miralax daily  - continue gabapentin 800 mg PO TID  - per Palliative care notes - last QTc was 430 ms in October 2022       Follow up: 2 months      SUBJECTIVE:     History of Present Illness:  Patient is a 72 y.o. year old female with h/o   lung cancer in left upper lobe and adjacent mediastinum. She is under management of Dr Styles- oncology, patient previously on chemotherapy but has decided that she does not want to continue treatment due to its side effects.       Palliative Discussion/ACP  Patient is here today accompanied by her daughter , she reports she is doing fair. She has some episodes of coughing up blood, she states she does not want further work up. Patient wants to focus on quality of life. She is no longer doing chemotherapy. Discussed Hospice  as an option for care moving forward. Patient understands this is an opportunity to have in home support , and better management of her pain and other symptoms. She is agreeable to informational visit at home    Advance Care Planning     Date: 06/12/2023    Code Status  In light of the patients advanced and life limiting illness,I engaged the the patient in a conversation about the patient's preferences for care  at the very end of life. The patient wishes to have a natural, peaceful death.  Along those lines, the patient does not wish to have CPR or other invasive treatments performed when her heart and/or breathing stops. I communicated to the patient that a LaPOST form was completed to reflect other EOL preferences of the patient such as DNR, Comfort measures, no feeding tube .  I spent a total of 25 minutes engaging the patient in this advance care planning discussion.          Pain  Pain score 5/10 today, pain is to left side of chest   Pain decreases to to 2-3 /10 with current regiment- methadone, oxycodone and gabapentin  Will continue for now.     Appetite  -overall stable, no excessive weight gain or loss  -currently not needing appetite stimulant     Sleep  -normal sleep pattern with trazodone     Mood  - feels down related to body image in particular hair loss , she is not interested in therapy  - Currently on Prozac for depression    Denies SI/HI    Will continue to monitor      Bowel Movement  -  BM every 3 days, she has not been taking senokot, encourage to use and to take Miralax  She has algorithm for Constipation       Urination  -able to have normal urination    ROS:  Review of Systems   Respiratory:  Positive for cough and shortness of breath.         Left sided chest pain   Gastrointestinal:  Positive for constipation.     Past Medical History:   Diagnosis Date    Acute kidney injury     Cervical radiculopathy     Cigarette smoker 1/26/2022    Diabetes mellitus     Hypertension     Mass of upper  lobe of left lung 1/26/2022    Neuropathy     Rhabdomyolysis      Past Surgical History:   Procedure Laterality Date    BILATERAL TUBAL LIGATION      ENDOBRONCHIAL ULTRASOUND N/A 12/13/2021    Procedure: ENDOBRONCHIAL ULTRASOUN;  Surgeon: William Padgett Jr., DO;  Location: [facility];  Service: Pulmonary;  Laterality: N/A;    HYSTERECTOMY      INSERTION OF TUNNELED CENTRAL VENOUS CATHETER (CVC) WITH SUBCUTANEOUS PORT N/A 3/9/2022    Procedure: [id number];  Surgeon: Jos Solitario MD;  Location: [facility];  Service: General;  Laterality: N/A;    NAVIGATIONAL BRONCHOSCOPY Left 12/13/2021    Procedure: BRONCHOSCOPY, NAVIGATIONAL;  Surgeon: William Padgett Jr., DO;  Location: [facility];  Service: Pulmonary;  Laterality: Left;     Family History   Problem Relation Age of Onset    Valvular heart disease Mother      Review of patient's allergies indicates:   Allergen Reactions    Codeine Hives and Other (See Comments)     Other reaction(s): Unknown  Other reaction(s): Unknown      Ibuprofen Swelling    Morphine Diarrhea and Other (See Comments)     Other reaction(s): Renal Dysfunctions  Other reaction(s): Renal Dysfunctions       Social Determinants of Health     Tobacco Use: High Risk    Smoking Tobacco Use: Every Day    Smokeless Tobacco Use: Never    Passive Exposure: Not on file   Alcohol Use: Not on file   Financial Resource Strain: Not on file   Food Insecurity: Not on file   Transportation Needs: Not on file   Physical Activity: Not on file   Stress: Not on file   Social Connections: Not on file   Housing Stability: Not on file   Depression: Low Risk     Last PHQ Score: 2      The Modified Caregiver Strain Index (MCSI) -   No data recorded   No data recorded   No data recorded   No data recorded   No data recorded   No data recorded   No data recorded   No data recorded   No data recorded   No data recorded   No data recorded   No data recorded   No data recorded   No data recorded       OBJECTIVE:      Physical Exam:  Vitals: Temp: 97.3 °F (36.3 °C) (06/09/23 1039)  Pulse: 90 (06/09/23 1039)  Resp: 16 (06/09/23 1039)  BP: 114/72 (06/09/23 1039)  SpO2: (!) 94 % (06/09/23 1039)  Physical Exam  HENT:      Head: Normocephalic.      Mouth/Throat:      Mouth: Mucous membranes are moist.   Pulmonary:      Effort: Pulmonary effort is normal.   Abdominal:      General: There is no distension.      Palpations: Abdomen is soft.   Musculoskeletal:         General: Normal range of motion.      Cervical back: Normal range of motion.   Skin:     General: Skin is warm and dry.      Coloration: Skin is pale.   Neurological:      Mental Status: She is alert.      Motor: Weakness present.   Psychiatric:         Mood and Affect: Mood normal.         Review of Symptoms      Symptom Assessment (ESAS 0-10 Scale)  Pain:  0  Dyspnea:  0  Anxiety:  0  Nausea:  0  Depression:  4  Anorexia:  0  Fatigue:  2  Insomnia:  5  Restlessness:  0  Agitation:  0       Constipation:  Constipation    Living Arrangements:  Lives with family    Psychosocial/Cultural:   See Palliative Psychosocial Note: Yes  **Primary  to Follow**  Palliative Care  Consult: No    Advance Care Planning   Advance Directives:   Living Will: Yes        Copy on chart: Yes    LaPOST: Yes    Do Not Resuscitate Status: Yes    Medical Power of : Yes      Decision Making:  Patient answered questions  Goals of Care: The patient and family endorses that what is most important right now is to focus on spending time at home and comfort and QOL     Accordingly, we have decided that the best plan to meet the patient's goals includes enrolling in hospice care            Medications:    Current Outpatient Medications:     albuterol (PROVENTIL/VENTOLIN HFA) 90 mcg/actuation inhaler, Inhale 2 puffs into the lungs every 6 (six) hours as needed for Wheezing or Shortness of Breath. Rescue, Disp: 1 g, Rfl: 5    blood sugar diagnostic Strp, To check BG 3 times  daily, to use with insurance preferred meter. Please provide a meter and supplies that their insurance will cover. Thank You. E11.65, Disp: 100 strip, Rfl: 1    blood-glucose meter kit, To check BG 3 times daily, to use with insurance preferred meter. Please provide a meter and supplies that their insurance will cover. Thank You. E11.65, Disp: 1 each, Rfl: 1    BREO ELLIPTA 100-25 mcg/dose diskus inhaler, Inhale 2 puffs into the lungs once daily., Disp: , Rfl:     empagliflozin (JARDIANCE) 25 mg tablet, Take 25 mg by mouth once daily., Disp: , Rfl:     FLUoxetine 20 MG capsule, Take 20 mg by mouth every evening., Disp: , Rfl:     FLUoxetine 40 MG capsule, Take 1 capsule (40 mg total) by mouth once daily., Disp: 30 capsule, Rfl: 11    gabapentin (NEURONTIN) 800 MG tablet, Take 1 tablet (800 mg total) by mouth 3 (three) times daily. for 17 days, Disp: 51 tablet, Rfl: 0    lancets Misc, To check BG 3 times daily, to use with insurance preferred meter.Please provide a meter and supplies that their insurance will cover. Thank You. E11.65, Disp: 100 each, Rfl: 1    lovastatin (MEVACOR) 10 MG tablet, Take 10 mg by mouth nightly., Disp: , Rfl:     methadone (DOLOPHINE) 10 MG tablet, Take 1.5 tablets (15 mg total) by mouth every 8 (eight) hours as needed for Pain., Disp: 135 tablet, Rfl: 0    naloxone (NARCAN) 4 mg/actuation Spry, 4mg by nasal route as needed for opioid overdose; may repeat every 2-3 minutes in alternating nostrils until medical help arrives. Call 911, Disp: 1 each, Rfl: 11    ondansetron (ZOFRAN) 8 MG tablet, Take 1 tablet (8 mg total) by mouth every 8 (eight) hours as needed for Nausea., Disp: 60 tablet, Rfl: 2    oxyCODONE (ROXICODONE) 30 MG Tab, Take 1 tablet (30 mg total) by mouth every 4 (four) hours as needed., Disp: 180 tablet, Rfl: 0    prochlorperazine (COMPAZINE) 5 MG tablet, Take 1 tablet (5 mg total) by mouth every 6 (six) hours as needed for Nausea., Disp: 60 tablet, Rfl: 2    promethazine  (PHENERGAN) 25 MG tablet, Take 25 mg by mouth every 6 (six) hours as needed (nausea/vomiting)., Disp: , Rfl:     promethazine (PHENERGAN) 12.5 MG Tab, Take 12.5 mg by mouth every 6 (six) hours as needed (nausea/vomiting)., Disp: , Rfl:     senna (SENOKOT) 8.6 mg tablet, Take 2 tablets by mouth 2 (two) times daily., Disp: 120 tablet, Rfl: 0    traZODone (DESYREL) 50 MG tablet, Take 1 tablet (50 mg total) by mouth every evening., Disp: 30 tablet, Rfl: 0    Labs:  CBC:   WBC   Date Value Ref Range Status   05/09/2023 12.03 3.90 - 12.70 K/uL Final     Hemoglobin   Date Value Ref Range Status   05/09/2023 11.6 (L) 12.0 - 16.0 g/dL Final     Hematocrit   Date Value Ref Range Status   05/09/2023 37.1 37.0 - 48.5 % Final     MCV   Date Value Ref Range Status   05/09/2023 90 82 - 98 fL Final     Platelets   Date Value Ref Range Status   05/09/2023 260 150 - 450 K/uL Final       LFT:   Lab Results   Component Value Date    AST 9 (L) 05/09/2023    ALKPHOS 98 05/09/2023    BILITOT 0.4 05/09/2023       Albumin:   Albumin   Date Value Ref Range Status   05/09/2023 3.1 (L) 3.5 - 5.2 g/dL Final     Protein:   Total Protein   Date Value Ref Range Status   05/09/2023 7.5 6.0 - 8.4 g/dL Final       Radiology:None      40 minutes of total time spent on the encounter, which includes face to face time and non-face to face time preparing to see the patient (eg, review of tests), Obtaining and/or reviewing separately obtained history, Documenting clinical information in the electronic or other health record, Independently interpreting results if documented above (not separately reported) and communicating results to the patient/family/caregiver, or Care coordination (not separately reported).    25 minutes spent in discussing ACP    Signature: Anais Chatterjee NP

## 2023-06-12 NOTE — PATIENT INSTRUCTIONS
- cont methadone 15 mg every 8 hours  - continue oxycodone IR 30 mg every 4 hours breakthrough pain  - continue senna 2 tabs twice daily  - continue gabapentin 800 mg every 8 hours

## 2023-07-03 PROBLEM — E11.10 DKA (DIABETIC KETOACIDOSIS): Status: RESOLVED | Noted: 2023-01-01 | Resolved: 2023-01-01

## 2023-08-08 PROBLEM — E78.2 MIXED HYPERLIPIDEMIA: Status: ACTIVE | Noted: 2022-02-11

## 2023-08-08 PROBLEM — Z71.6 TOBACCO ABUSE COUNSELING: Status: ACTIVE | Noted: 2021-11-01

## 2023-08-08 PROBLEM — F17.210 NICOTINE DEPENDENCE, CIGARETTES, UNCOMPLICATED: Status: ACTIVE | Noted: 2021-11-01

## 2023-08-08 PROBLEM — M48.02 FORAMINAL STENOSIS OF CERVICAL REGION: Status: ACTIVE | Noted: 2021-05-04

## 2023-08-08 PROBLEM — F43.23 SITUATIONAL MIXED ANXIETY AND DEPRESSIVE DISORDER: Status: ACTIVE | Noted: 2021-05-04

## 2023-08-08 PROBLEM — J43.8 OTHER EMPHYSEMA: Status: ACTIVE | Noted: 2022-06-16

## 2023-08-08 PROBLEM — K11.8 PAROTID NODULE: Status: ACTIVE | Noted: 2022-02-11

## 2023-08-08 PROBLEM — C34.90 SQUAMOUS CELL LUNG CANCER: Status: ACTIVE | Noted: 2022-06-08

## 2023-08-08 PROBLEM — L97.511 SKIN ULCER OF SECOND TOE OF RIGHT FOOT, LIMITED TO BREAKDOWN OF SKIN: Status: ACTIVE | Noted: 2022-06-22

## 2023-10-05 NOTE — PROGRESS NOTES
Oncology Nutrition   Teresa Earl   1950    The[employer]ry     Pt eligible for [employer] . Order filled out with patient at chairside. All patient questions or concerns regarding  and/or nutrition status addressed. RD to continue to monitor prn and provide patient food while under active treatment.     Food order filled by this RD- will provide to patient at end of infusion today.    Millie Car, JANISN, LDN  12/13/2022  8:24 AM        minutes on the discharge service.

## 2023-11-01 NOTE — PROGRESS NOTES
Oncology Nutrition   Chemotherapy Infusion Visit    Nutrition Follow Up   RD met with patient at chairside during infusion treatment. Pt with 1# weight loss since last infusion one month ago. Pt states she is eating well and eats throughout the day. Pt denies any significant nutrition related side effects. RD reinforced importance of weight maintenance. Pt VU.     Eligible for TFP- order filled and provided to patient at the end of infusion today.    Wt Readings from Last 10 Encounters:   08/23/22 56.4 kg (124 lb 5.4 oz)   08/23/22 56.4 kg (124 lb 5.4 oz)   07/27/22 57.1 kg (125 lb 14.1 oz)   07/27/22 57.1 kg (125 lb 14.1 oz)   06/23/22 58.2 kg (128 lb 4.9 oz)   06/23/22 58.2 kg (128 lb 4.9 oz)   05/26/22 58.5 kg (128 lb 15.5 oz)   05/12/22 60.3 kg (132 lb 15 oz)   04/29/22 60.8 kg (134 lb)   04/21/22 61 kg (134 lb 7.7 oz)       All other nutrition questions/concerns addressed as appropriate. Will continue to follow and monitor throughout treatment PRN.     Jessica Gentile, MS, RD, LDN  08/23/2022  1:57 PM           No abnormal movements No abnormal movements No abnormal movements No abnormal movements No abnormal movements No abnormal movements No abnormal movements No abnormal movements No abnormal movements No abnormal movements No abnormal movements No abnormal movements No abnormal movements No abnormal movements No abnormal movements No abnormal movements

## 2025-07-05 NOTE — TELEPHONE ENCOUNTER
----- Message from Ella Barnes Patient Care Assistant sent at 7/20/2022  9:22 AM CDT -----  Regarding: selene  Type:  Patient Returning Call    Who Called:  maxwell  Who Left Message for Patient:  selene  Does the patient know what this is regarding?:  ?  Best Call Back Number:  [phone]    Additional Information:  patient is returning a call , please call to further discuss  thank you     
Spoke with patient moved appt on 7/21 to Dr. Styles at 840 am and labs to 8am.  Patient confirmed and ok with move.  
s/p fall/hip pain/injury

## (undated) DEVICE — SUT 3-0 VICRYL / SH (J416)

## (undated) DEVICE — TOWEL OR DISP STRL BLUE 4/PK

## (undated) DEVICE — SUT 2-0 VICRYL / SH (J417)

## (undated) DEVICE — SYR 10CC LUER LOCK

## (undated) DEVICE — SPONGE DERMACEA 4X4IN 12PLY

## (undated) DEVICE — ELECTRODE REM PLYHSV RETURN 9

## (undated) DEVICE — DEVICE CARESITE LUER ACCESS

## (undated) DEVICE — CLOSURE SKIN STERI STRIP 1/2X4

## (undated) DEVICE — GLOVE SURG BIOGEL LATEX SZ 7.5

## (undated) DEVICE — SEE L#120831

## (undated) DEVICE — APPLICATOR CHLORAPREP ORN 26ML

## (undated) DEVICE — PACK BASIC

## (undated) DEVICE — SEE MEDLINE ITEM 157128

## (undated) DEVICE — PACK SET UP 190 OMC-NS

## (undated) DEVICE — PENCIL ROCKER SWITCH 10FT CORD

## (undated) DEVICE — DRAPE LAP TIBURON 77X122IN

## (undated) DEVICE — DRESSING TRANS 4X4 TEGADERM

## (undated) DEVICE — DRAPE C ARM 42 X 120 10/BX

## (undated) DEVICE — SUT MONOCRYL 4-0 PS-2